# Patient Record
Sex: FEMALE | Race: WHITE | NOT HISPANIC OR LATINO | Employment: UNEMPLOYED | URBAN - NONMETROPOLITAN AREA
[De-identification: names, ages, dates, MRNs, and addresses within clinical notes are randomized per-mention and may not be internally consistent; named-entity substitution may affect disease eponyms.]

---

## 2017-02-07 ENCOUNTER — OFFICE VISIT (OUTPATIENT)
Dept: RETAIL CLINIC | Facility: CLINIC | Age: 27
End: 2017-02-07

## 2017-02-07 VITALS
RESPIRATION RATE: 20 BRPM | BODY MASS INDEX: 17.2 KG/M2 | OXYGEN SATURATION: 98 % | HEART RATE: 108 BPM | WEIGHT: 87.6 LBS | TEMPERATURE: 98.7 F | HEIGHT: 60 IN

## 2017-02-07 DIAGNOSIS — J01.00 SUBACUTE MAXILLARY SINUSITIS: Primary | ICD-10-CM

## 2017-02-07 LAB
EXPIRATION DATE: NORMAL
FLUAV AG NPH QL: NORMAL
FLUBV AG NPH QL: NORMAL
INTERNAL CONTROL: NORMAL
Lab: NORMAL

## 2017-02-07 PROCEDURE — 99213 OFFICE O/P EST LOW 20 MIN: CPT | Performed by: NURSE PRACTITIONER

## 2017-02-07 PROCEDURE — 87804 INFLUENZA ASSAY W/OPTIC: CPT | Performed by: NURSE PRACTITIONER

## 2017-02-07 RX ORDER — FLUTICASONE PROPIONATE 50 MCG
2 SPRAY, SUSPENSION (ML) NASAL DAILY
Qty: 1 BOTTLE | Refills: 0 | Status: SHIPPED | OUTPATIENT
Start: 2017-02-07 | End: 2017-03-09

## 2017-02-07 RX ORDER — AMOXICILLIN AND CLAVULANATE POTASSIUM 875; 125 MG/1; MG/1
1 TABLET, FILM COATED ORAL 2 TIMES DAILY
Qty: 10 TABLET | Refills: 0 | Status: SHIPPED | OUTPATIENT
Start: 2017-02-07 | End: 2017-02-12

## 2017-02-07 RX ORDER — FLUCONAZOLE 150 MG/1
150 TABLET ORAL ONCE
Qty: 1 TABLET | Refills: 0 | Status: SHIPPED | OUTPATIENT
Start: 2017-02-07 | End: 2017-02-07

## 2017-02-07 NOTE — PROGRESS NOTES
Subjective   Janie Hollsi is a 26 y.o. female.   Chief Complaint   Patient presents with   • Fever   • URI      Fever    This is a new problem. The current episode started yesterday. The maximum temperature noted was 101 to 101.9 F. The temperature was taken using an oral thermometer. Associated symptoms include congestion, coughing, ear pain (right), headaches and a sore throat. Pertinent negatives include no nausea or wheezing. She has tried NSAIDs for the symptoms. The treatment provided moderate relief.   URI    This is a new problem. The current episode started yesterday. The problem has been gradually worsening. The maximum temperature recorded prior to her arrival was 101 - 101.9 F. The fever has been present for 1 to 2 days. Associated symptoms include congestion, coughing, ear pain (right), headaches, rhinorrhea, sinus pain, sneezing and a sore throat. Pertinent negatives include no nausea or wheezing.      Janie presents to the clinic today c/o congestion which started yesterday. Associated symptoms include fever of 101, headache and cough. Has tried dayquil and nyquil without adequate relief. She has also taken motrin for the fever, last dose at 7:30 this morning. Her daughter was seen this morning and diagnosed with croup, she was tested for flu but they do not know the results yet. Refer to ROS for additional information.    The following portions of the patient's history were reviewed and updated as appropriate: allergies, current medications, past family history, past medical history, past social history, past surgical history and problem list.    Review of Systems   Constitutional: Positive for fever.   HENT: Positive for congestion, ear pain (right), rhinorrhea, sneezing and sore throat.    Respiratory: Positive for cough. Negative for wheezing.    Gastrointestinal: Negative for nausea.   Neurological: Positive for headaches.       Allergies   Allergen Reactions   • Codeine Other (See  "Comments)     Increased Heart Rate       Visit Vitals   • Pulse 108   • Temp 98.7 °F (37.1 °C) (Temporal Artery )   • Resp 20   • Ht 60\" (152.4 cm)   • Wt 87 lb 9.6 oz (39.7 kg)   • SpO2 98%   • BMI 17.11 kg/m2         Objective     Physical Exam   Constitutional: She is oriented to person, place, and time. She appears well-developed and well-nourished.   HENT:   Head: Normocephalic.   Right Ear: Tympanic membrane is bulging.   Left Ear: Tympanic membrane is bulging.   Nose: Rhinorrhea present. Right sinus exhibits maxillary sinus tenderness and frontal sinus tenderness. Left sinus exhibits maxillary sinus tenderness and frontal sinus tenderness.   Mouth/Throat: Posterior oropharyngeal erythema present.   Cardiovascular: Normal rate and regular rhythm.    Pulmonary/Chest: Effort normal and breath sounds normal.   Neurological: She is alert and oriented to person, place, and time.   Nursing note and vitals reviewed.      Assessment/Plan   Janie was seen today for fever and uri.    Diagnoses and all orders for this visit:    Subacute maxillary sinusitis  -     POC Influenza A / B    Other orders  -     amoxicillin-clavulanate (AUGMENTIN) 875-125 MG per tablet; Take 1 tablet by mouth 2 (Two) Times a Day for 5 days.  -     fluticasone (FLONASE) 50 MCG/ACT nasal spray; 2 sprays into each nostril Daily for 30 days. Administer 2 sprays in each nostril for each dose.  -     fluconazole (DIFLUCAN) 150 MG tablet; Take 1 tablet by mouth 1 (One) Time for 1 dose.             Discussed negative flu results with patient. Explained the importance of taking antibiotics until completion. Follow up with PCP or at the Urgent Care if symptoms worsen or fail to improve within next 48-72 hours.  Patient teaching information discussed and provided to the patient. Patient verbalized understanding.        "

## 2017-02-07 NOTE — PATIENT INSTRUCTIONS

## 2017-06-16 ENCOUNTER — OFFICE VISIT (OUTPATIENT)
Dept: RETAIL CLINIC | Facility: CLINIC | Age: 27
End: 2017-06-16

## 2017-06-16 VITALS
HEART RATE: 93 BPM | TEMPERATURE: 98 F | RESPIRATION RATE: 18 BRPM | OXYGEN SATURATION: 98 % | WEIGHT: 90.8 LBS | HEIGHT: 60 IN | BODY MASS INDEX: 17.83 KG/M2

## 2017-06-16 DIAGNOSIS — L23.7 POISON IVY DERMATITIS: Primary | ICD-10-CM

## 2017-06-16 PROCEDURE — 99212 OFFICE O/P EST SF 10 MIN: CPT | Performed by: NURSE PRACTITIONER

## 2017-06-16 RX ORDER — PREDNISONE 10 MG/1
TABLET ORAL
Qty: 12 TABLET | Refills: 0 | Status: SHIPPED | OUTPATIENT
Start: 2017-06-16 | End: 2018-05-03

## 2017-06-16 RX ORDER — TRIAMCINOLONE ACETONIDE 1 MG/G
CREAM TOPICAL 2 TIMES DAILY
Qty: 80 G | Refills: 0 | Status: SHIPPED | OUTPATIENT
Start: 2017-06-16 | End: 2018-05-03

## 2017-06-16 RX ORDER — BUSPIRONE HYDROCHLORIDE 7.5 MG/1
7.5 TABLET ORAL 3 TIMES DAILY
COMMUNITY
End: 2018-05-03

## 2017-06-16 NOTE — PATIENT INSTRUCTIONS
Poison Ivy Dermatitis  Poison ivy dermatitis is redness and soreness (inflammation) of the skin. It is caused by a chemical that is found on the leaves of the poison ivy plant. You may also have itching, a rash, and blisters. Symptoms often clear up in 1-2 weeks.   You may get this condition by touching a poison ivy plant. You can also get it by touching something that has the chemical on it. This may include animals or objects that have come in contact with the plant.  HOME CARE  General Instructions  · Take or apply over-the-counter and prescription medicines only as told by your doctor.  · If you touch poison ivy, wash your skin with soap and cold water right away.  · Use hydrocortisone creams or calamine lotion as needed to help with itching.  · Take oatmeal baths as needed. Use colloidal oatmeal. You can get this at a pharmacy or grocery store. Follow the instructions on the package.  · Do not scratch or rub your skin.  · While you have the rash, wash your clothes right after you wear them.  Prevention  · Know what poison ivy looks like so you can avoid it. This plant has three leaves with flowering branches on a single stem. The leaves are glossy. They have uneven edges that come to a point at the front.  · If you have touched poison ivy, wash with soap and water right away. Be sure to wash under your fingernails.  · When hiking or camping, wear long pants, a long-sleeved shirt, tall socks, and hiking boots. You can also use a lotion on your skin that helps to prevent contact with the chemical on the plant.  · If you think that your clothes or outdoor gear came in contact with poison ivy, rinse them off with a garden hose before you bring them inside your house.  GET HELP IF:  · You have open sores in the rash area.  · You have more redness, swelling, or pain in the affected area.  · You have redness that spreads beyond the rash area.  · You have fluid, blood, or pus coming from the affected area.  · You have  a fever.  · You have a rash over a large area of your body.  · You have a rash on your eyes, mouth, or genitals.  · Your rash does not get better after a few days.  GET HELP RIGHT AWAY IF:  · Your face swells or your eyes swell shut.  · You have trouble breathing.  · You have trouble swallowing.     This information is not intended to replace advice given to you by your health care provider. Make sure you discuss any questions you have with your health care provider.     Document Released: 01/20/2012 Document Revised: 04/10/2017 Document Reviewed: 05/25/2016  [employer] Interactive Patient Education ©2017 [employer]

## 2017-06-16 NOTE — PROGRESS NOTES
"Subjective   Janie Hollis is a 26 y.o. female.     Chief Complaint   Patient presents with   • Rash      History of Present Illness   Rash  Patient presents for evaluation of a rash involving the face, abdomen and left leg. Rash started 5 days ago. Lesions are red, and raised and fluid filled in texture. Rash has changed over time. Rash is pruritic. Associated symptoms: none. Patient denies: fever, headache and sore throat. Patient has not had contacts with similar rash. Patient has had new exposures poison ivy plant while working in her yard days earlier.    Reports was seen at  4 days ago, treated with a steroid injection. Had initial improvement of rash. However this am she noticed the rash had spread to her face.    The following portions of the patient's history were reviewed and updated as appropriate: allergies, current medications, past family history, past medical history, past social history, past surgical history and problem list.    Current Outpatient Prescriptions:   •  busPIRone (BUSPAR) 7.5 MG tablet, Take 7.5 mg by mouth 3 (Three) Times a Day., Disp: , Rfl:   •  predniSONE (DELTASONE) 10 MG tablet, Take 2 tabs po BID x 2 days; then 1 tab po BID x 2 days; then 1 tab daily x 2 days., Disp: 12 tablet, Rfl: 0  •  sertraline (ZOLOFT) 50 MG tablet, Take 50 mg by mouth Daily., Disp: , Rfl:   •  triamcinolone (KENALOG) 0.1 % cream, Apply  topically 2 (Two) Times a Day. To the affected area, Disp: 80 g, Rfl: 0    Pulse 93  Temp 98 °F (36.7 °C)  Resp 18  Ht 60\" (152.4 cm)  Wt 90 lb 12.8 oz (41.2 kg)  SpO2 98%  BMI 17.73 kg/m2    Review of Systems   Constitutional: Negative for chills and fever.   Respiratory: Negative for choking and wheezing.    Skin: Positive for rash. Negative for color change.       Allergies   Allergen Reactions   • Codeine Other (See Comments)     Increased Heart Rate     Physical Exam   Constitutional: She appears well-developed.   HENT:   Head: Normocephalic.   Eyes: " Conjunctivae are normal.   Cardiovascular: Normal rate.    Pulmonary/Chest: Effort normal and breath sounds normal.   Skin: Skin is warm. Rash noted.        Fluid filled rash on erythema base noted under the left eye, abdomen and left lower extremity, sparsely scattered. Positive for pruritis. Negative for signs of infection.       Assessment/Plan     Janie was seen today for rash.    Diagnoses and all orders for this visit:    Poison ivy dermatitis  -     triamcinolone (KENALOG) 0.1 % cream; Apply  topically 2 (Two) Times a Day. To the affected area  -     predniSONE (DELTASONE) 10 MG tablet; Take 2 tabs po BID x 2 days; then 1 tab po BID x 2 days; then 1 tab daily x 2 days.         Discussed treatment plan. Follow up with PCP or at the Urgent Care if symptoms worsen or fail to improve within next week. Patient teaching information discussed and provided to the patient. Patient verbalized understanding.      June 16, 2017 5:54 PM

## 2018-04-04 ENCOUNTER — APPOINTMENT (OUTPATIENT)
Dept: LAB | Facility: HOSPITAL | Age: 28
End: 2018-04-04
Attending: OBSTETRICS & GYNECOLOGY

## 2018-04-04 ENCOUNTER — TRANSCRIBE ORDERS (OUTPATIENT)
Dept: ADMINISTRATIVE | Facility: HOSPITAL | Age: 28
End: 2018-04-04

## 2018-04-04 DIAGNOSIS — N93.9 VAGINAL HEMORRHAGE: Primary | ICD-10-CM

## 2018-04-04 LAB
ANION GAP SERPL CALCULATED.3IONS-SCNC: 6.9 MMOL/L (ref 3.6–11.2)
BASOPHILS # BLD AUTO: 0.08 10*3/MM3 (ref 0–0.3)
BASOPHILS NFR BLD AUTO: 1 % (ref 0–2)
BILIRUB UR QL STRIP: NEGATIVE
BUN BLD-MCNC: 8 MG/DL (ref 7–21)
BUN/CREAT SERPL: 13.3 (ref 7–25)
CALCIUM SPEC-SCNC: 9.4 MG/DL (ref 7.7–10)
CHLORIDE SERPL-SCNC: 106 MMOL/L (ref 99–112)
CLARITY UR: CLEAR
CO2 SERPL-SCNC: 27.1 MMOL/L (ref 24.3–31.9)
COLOR UR: YELLOW
CREAT BLD-MCNC: 0.6 MG/DL (ref 0.43–1.29)
DEPRECATED RDW RBC AUTO: 43 FL (ref 37–54)
EOSINOPHIL # BLD AUTO: 0.06 10*3/MM3 (ref 0–0.7)
EOSINOPHIL NFR BLD AUTO: 0.8 % (ref 0–5)
ERYTHROCYTE [DISTWIDTH] IN BLOOD BY AUTOMATED COUNT: 14.5 % (ref 11.5–14.5)
GFR SERPL CREATININE-BSD FRML MDRD: 122 ML/MIN/1.73
GLUCOSE BLD-MCNC: 83 MG/DL (ref 70–110)
GLUCOSE UR STRIP-MCNC: NEGATIVE MG/DL
HCG INTACT+B SERPL-ACNC: <2 MIU/ML (ref 0–5)
HCT VFR BLD AUTO: 41.8 % (ref 37–47)
HGB BLD-MCNC: 14.1 G/DL (ref 12–16)
HGB UR QL STRIP.AUTO: NEGATIVE
IMM GRANULOCYTES # BLD: 0.02 10*3/MM3 (ref 0–0.03)
IMM GRANULOCYTES NFR BLD: 0.3 % (ref 0–0.5)
KETONES UR QL STRIP: NEGATIVE
LEUKOCYTE ESTERASE UR QL STRIP.AUTO: NEGATIVE
LYMPHOCYTES # BLD AUTO: 2.92 10*3/MM3 (ref 1–3)
LYMPHOCYTES NFR BLD AUTO: 37.7 % (ref 21–51)
MCH RBC QN AUTO: 27.4 PG (ref 27–33)
MCHC RBC AUTO-ENTMCNC: 33.7 G/DL (ref 33–37)
MCV RBC AUTO: 81.2 FL (ref 80–94)
MONOCYTES # BLD AUTO: 0.64 10*3/MM3 (ref 0.1–0.9)
MONOCYTES NFR BLD AUTO: 8.3 % (ref 0–10)
NEUTROPHILS # BLD AUTO: 4.03 10*3/MM3 (ref 1.4–6.5)
NEUTROPHILS NFR BLD AUTO: 51.9 % (ref 30–70)
NITRITE UR QL STRIP: NEGATIVE
OSMOLALITY SERPL CALC.SUM OF ELEC: 276.9 MOSM/KG (ref 273–305)
PH UR STRIP.AUTO: 5.5 [PH] (ref 5–8)
PLATELET # BLD AUTO: 247 10*3/MM3 (ref 130–400)
PMV BLD AUTO: 11 FL (ref 6–10)
POTASSIUM BLD-SCNC: 3.6 MMOL/L (ref 3.5–5.3)
PROT UR QL STRIP: NEGATIVE
RBC # BLD AUTO: 5.15 10*6/MM3 (ref 4.2–5.4)
SODIUM BLD-SCNC: 140 MMOL/L (ref 135–153)
SP GR UR STRIP: 1.02 (ref 1–1.03)
T4 FREE SERPL-MCNC: 1.07 NG/DL (ref 0.89–1.76)
TSH SERPL DL<=0.05 MIU/L-ACNC: 1.73 MIU/ML (ref 0.55–4.78)
UROBILINOGEN UR QL STRIP: NORMAL
WBC NRBC COR # BLD: 7.75 10*3/MM3 (ref 4.5–12.5)

## 2018-04-04 PROCEDURE — 85025 COMPLETE CBC W/AUTO DIFF WBC: CPT | Performed by: OBSTETRICS & GYNECOLOGY

## 2018-04-04 PROCEDURE — 80048 BASIC METABOLIC PNL TOTAL CA: CPT | Performed by: OBSTETRICS & GYNECOLOGY

## 2018-04-04 PROCEDURE — 84702 CHORIONIC GONADOTROPIN TEST: CPT | Performed by: OBSTETRICS & GYNECOLOGY

## 2018-04-04 PROCEDURE — 84443 ASSAY THYROID STIM HORMONE: CPT | Performed by: OBSTETRICS & GYNECOLOGY

## 2018-04-04 PROCEDURE — 36415 COLL VENOUS BLD VENIPUNCTURE: CPT | Performed by: OBSTETRICS & GYNECOLOGY

## 2018-04-04 PROCEDURE — 81003 URINALYSIS AUTO W/O SCOPE: CPT | Performed by: OBSTETRICS & GYNECOLOGY

## 2018-04-04 PROCEDURE — 84439 ASSAY OF FREE THYROXINE: CPT | Performed by: OBSTETRICS & GYNECOLOGY

## 2018-05-02 ENCOUNTER — PREP FOR SURGERY (OUTPATIENT)
Dept: OTHER | Facility: HOSPITAL | Age: 28
End: 2018-05-02

## 2018-05-02 DIAGNOSIS — N80.9 ENDOMETRIOSIS: Primary | ICD-10-CM

## 2018-05-02 RX ORDER — SODIUM CHLORIDE 0.9 % (FLUSH) 0.9 %
1-10 SYRINGE (ML) INJECTION AS NEEDED
Status: CANCELLED | OUTPATIENT
Start: 2018-05-08

## 2018-05-03 ENCOUNTER — APPOINTMENT (OUTPATIENT)
Dept: PREADMISSION TESTING | Facility: HOSPITAL | Age: 28
End: 2018-05-03

## 2018-05-03 DIAGNOSIS — N80.9 ENDOMETRIOSIS: ICD-10-CM

## 2018-05-03 LAB
ABO GROUP BLD: NORMAL
ANION GAP SERPL CALCULATED.3IONS-SCNC: 6 MMOL/L (ref 3.6–11.2)
BASOPHILS # BLD AUTO: 0.03 10*3/MM3 (ref 0–0.3)
BASOPHILS NFR BLD AUTO: 0.5 % (ref 0–2)
BLD GP AB SCN SERPL QL: NEGATIVE
BUN BLD-MCNC: 8 MG/DL (ref 7–21)
BUN/CREAT SERPL: 14.5 (ref 7–25)
CALCIUM SPEC-SCNC: 9.1 MG/DL (ref 7.7–10)
CHLORIDE SERPL-SCNC: 106 MMOL/L (ref 99–112)
CO2 SERPL-SCNC: 27 MMOL/L (ref 24.3–31.9)
CREAT BLD-MCNC: 0.55 MG/DL (ref 0.43–1.29)
DEPRECATED RDW RBC AUTO: 40.8 FL (ref 37–54)
EOSINOPHIL # BLD AUTO: 0.06 10*3/MM3 (ref 0–0.7)
EOSINOPHIL NFR BLD AUTO: 1 % (ref 0–5)
ERYTHROCYTE [DISTWIDTH] IN BLOOD BY AUTOMATED COUNT: 14 % (ref 11.5–14.5)
GFR SERPL CREATININE-BSD FRML MDRD: 135 ML/MIN/1.73
GLUCOSE BLD-MCNC: 66 MG/DL (ref 70–110)
HCT VFR BLD AUTO: 39 % (ref 37–47)
HGB BLD-MCNC: 13.6 G/DL (ref 12–16)
IMM GRANULOCYTES # BLD: 0.01 10*3/MM3 (ref 0–0.03)
IMM GRANULOCYTES NFR BLD: 0.2 % (ref 0–0.5)
LYMPHOCYTES # BLD AUTO: 1.96 10*3/MM3 (ref 1–3)
LYMPHOCYTES NFR BLD AUTO: 31.5 % (ref 21–51)
MCH RBC QN AUTO: 28.5 PG (ref 27–33)
MCHC RBC AUTO-ENTMCNC: 34.9 G/DL (ref 33–37)
MCV RBC AUTO: 81.6 FL (ref 80–94)
MONOCYTES # BLD AUTO: 0.35 10*3/MM3 (ref 0.1–0.9)
MONOCYTES NFR BLD AUTO: 5.6 % (ref 0–10)
NEUTROPHILS # BLD AUTO: 3.81 10*3/MM3 (ref 1.4–6.5)
NEUTROPHILS NFR BLD AUTO: 61.2 % (ref 30–70)
OSMOLALITY SERPL CALC.SUM OF ELEC: 274.1 MOSM/KG (ref 273–305)
PLATELET # BLD AUTO: 226 10*3/MM3 (ref 130–400)
PMV BLD AUTO: 11.2 FL (ref 6–10)
POTASSIUM BLD-SCNC: 3.6 MMOL/L (ref 3.5–5.3)
RBC # BLD AUTO: 4.78 10*6/MM3 (ref 4.2–5.4)
RH BLD: POSITIVE
SODIUM BLD-SCNC: 139 MMOL/L (ref 135–153)
T&S EXPIRATION DATE: NORMAL
WBC NRBC COR # BLD: 6.22 10*3/MM3 (ref 4.5–12.5)

## 2018-05-03 PROCEDURE — 80048 BASIC METABOLIC PNL TOTAL CA: CPT | Performed by: OBSTETRICS & GYNECOLOGY

## 2018-05-03 PROCEDURE — 85025 COMPLETE CBC W/AUTO DIFF WBC: CPT | Performed by: OBSTETRICS & GYNECOLOGY

## 2018-05-03 PROCEDURE — 86901 BLOOD TYPING SEROLOGIC RH(D): CPT | Performed by: OBSTETRICS & GYNECOLOGY

## 2018-05-03 PROCEDURE — 86900 BLOOD TYPING SEROLOGIC ABO: CPT | Performed by: OBSTETRICS & GYNECOLOGY

## 2018-05-03 PROCEDURE — 86850 RBC ANTIBODY SCREEN: CPT | Performed by: OBSTETRICS & GYNECOLOGY

## 2018-05-03 PROCEDURE — 36415 COLL VENOUS BLD VENIPUNCTURE: CPT

## 2018-05-03 RX ORDER — PAROXETINE 30 MG/1
30 TABLET, FILM COATED ORAL EVERY MORNING
COMMUNITY
End: 2019-07-01

## 2018-05-03 NOTE — DISCHARGE INSTRUCTIONS
TAKE the following medications the morning of surgery:  All heart or blood pressure medications    Please discontinue all blood thinners and anticoagulants (except aspirin) prior to surgery as per your surgeon and cardiologist instructions.  Aspirin may be continued up to the day prior to surgery.    HOLD all diabetic medications the morning of surgery as order by physician.    Please follow instructions on use of prep cloths provided by nurse. Return instruction sheet with stickers attached to pre-op nurse on day of surgery.    Arrival time for surgery on 5/8/2018 is 1030 am.    General Instructions:  • Do NOT eat or drink after midnight 5/7/2018 which includes water, mints, or gum.  • You may brush your teeth. Dental appliances that are removable must be taken out day of surgery.  • Do NOT smoke, chew tobacco, or drink alcohol within 24 hours prior to surgery.  • Bring medications in original bottles, any inhalers and if applicable your C-PAP/BI-PAP machine  • Bring any papers given to you in the doctor’s office  • Wear clean, comfortable clothes and socks  • Do NOT wear contact lenses or make-up or dark nail polish.  Bring a case for your glasses if applicable.  • Bring crutches or walker if applicable  • Leave all other valuables and jewelry at home  • If you were given a blood bank armband, continue to wear it until discharged.    Preventing a Surgical Site Infection:  • Shower the night before surgery (unless instructed otherwise) using a fresh bar of anti-bacterial soap (such as Dial) and clean washcloth.  Dry with a clean towel and dress in clean clothing.  • For 2 to 3 days before surgery, avoid shaving with a razor near where you will have surgery because the razor can irritate skin and make it easier to develop an infection.  Ask your surgeon if you will be receiving antibiotics prior to surgery.  • Make sure you, your family, and all healthcare providers clean their hands with soap and water or an  alcohol-based hand  before caring for you or your wound.  • If at all possible, quit smoking as many days before surgery as you can.    Day of Surgery:  Upon arrival, a pre-op nurse and anesthesiologist will review your health history, obtain vital signs, and answer questions you may have.  The only belongings needed at this time will be your home medications and if applicable you C-PAP/BI-PAP machine.  If you are staying overnight, your family can leave the rest of your belongings in the car and bring them to your room later.  A pre-op nurse will start an IV and you may receive medication in preparation for surgery.  Due to patient privacy and limited space, only one member of your family will be able to accompany you in the pre-op area.  While you are in surgery your family should notify the waiting room  if they leave the waiting room area and provide a contact number.  Please be aware that surgery does come with discomfort.  We want to make every effort to control your discomfort so please discuss any uncontrolled symptoms with your nurse.  Your doctor will most likely have prescribed pain medications.  If you are going home after surgery you will receive individualized written care instructions before being discharged.  A responsible adult must drive you to and from the hospital on the day of surgery and stay with you for 24 hours.  If you are staying overnight following surgery, you will be transported to your hospital room following the recovery period.

## 2018-05-08 ENCOUNTER — APPOINTMENT (OUTPATIENT)
Dept: GENERAL RADIOLOGY | Facility: HOSPITAL | Age: 28
End: 2018-05-08
Attending: OBSTETRICS & GYNECOLOGY

## 2018-05-08 ENCOUNTER — ANESTHESIA (OUTPATIENT)
Dept: PERIOP | Facility: HOSPITAL | Age: 28
End: 2018-05-08

## 2018-05-08 ENCOUNTER — ANESTHESIA EVENT (OUTPATIENT)
Dept: PERIOP | Facility: HOSPITAL | Age: 28
End: 2018-05-08

## 2018-05-08 ENCOUNTER — HOSPITAL ENCOUNTER (OUTPATIENT)
Facility: HOSPITAL | Age: 28
Setting detail: HOSPITAL OUTPATIENT SURGERY
Discharge: HOME | End: 2018-05-08
Attending: OBSTETRICS & GYNECOLOGY | Admitting: OBSTETRICS & GYNECOLOGY

## 2018-05-08 ENCOUNTER — HOSPITAL ENCOUNTER (EMERGENCY)
Facility: HOSPITAL | Age: 28
Discharge: HOME | End: 2018-05-08
Attending: INTERNAL MEDICINE | Admitting: INTERNAL MEDICINE

## 2018-05-08 VITALS
DIASTOLIC BLOOD PRESSURE: 72 MMHG | OXYGEN SATURATION: 100 % | RESPIRATION RATE: 18 BRPM | TEMPERATURE: 98 F | WEIGHT: 92 LBS | SYSTOLIC BLOOD PRESSURE: 108 MMHG | HEART RATE: 82 BPM | HEIGHT: 60 IN | BODY MASS INDEX: 18.06 KG/M2

## 2018-05-08 VITALS
WEIGHT: 92 LBS | OXYGEN SATURATION: 97 % | DIASTOLIC BLOOD PRESSURE: 59 MMHG | SYSTOLIC BLOOD PRESSURE: 97 MMHG | HEART RATE: 76 BPM | TEMPERATURE: 97.6 F | RESPIRATION RATE: 18 BRPM | BODY MASS INDEX: 18.06 KG/M2 | HEIGHT: 60 IN

## 2018-05-08 DIAGNOSIS — N93.9 ABNORMAL UTERINE BLEEDING (AUB): ICD-10-CM

## 2018-05-08 DIAGNOSIS — T78.40XA ALLERGIC REACTION TO DRUG, INITIAL ENCOUNTER: Primary | ICD-10-CM

## 2018-05-08 DIAGNOSIS — N80.9 ENDOMETRIOSIS: ICD-10-CM

## 2018-05-08 DIAGNOSIS — R10.2 PELVIC PAIN: ICD-10-CM

## 2018-05-08 LAB
B-HCG UR QL: NEGATIVE
INTERNAL NEGATIVE CONTROL: NEGATIVE
INTERNAL POSITIVE CONTROL: POSITIVE
Lab: NORMAL

## 2018-05-08 PROCEDURE — 25010000002 MIDAZOLAM PER 1 MG: Performed by: NURSE ANESTHETIST, CERTIFIED REGISTERED

## 2018-05-08 PROCEDURE — 96372 THER/PROPH/DIAG INJ SC/IM: CPT

## 2018-05-08 PROCEDURE — 25010000002 NEOSTIGMINE 10 MG/10ML SOLUTION: Performed by: NURSE ANESTHETIST, CERTIFIED REGISTERED

## 2018-05-08 PROCEDURE — 25010000002 ONDANSETRON PER 1 MG: Performed by: NURSE ANESTHETIST, CERTIFIED REGISTERED

## 2018-05-08 PROCEDURE — 25010000002 DIPHENHYDRAMINE PER 50 MG: Performed by: PHYSICIAN ASSISTANT

## 2018-05-08 PROCEDURE — 25010000002 METHYLPREDNISOLONE PER 125 MG: Performed by: PHYSICIAN ASSISTANT

## 2018-05-08 PROCEDURE — 25010000002 PROPOFOL 10 MG/ML EMULSION: Performed by: NURSE ANESTHETIST, CERTIFIED REGISTERED

## 2018-05-08 PROCEDURE — 25010000002 CEFOXITIN: Performed by: OBSTETRICS & GYNECOLOGY

## 2018-05-08 PROCEDURE — 99283 EMERGENCY DEPT VISIT LOW MDM: CPT

## 2018-05-08 PROCEDURE — 25010000002 DEXAMETHASONE PER 1 MG: Performed by: NURSE ANESTHETIST, CERTIFIED REGISTERED

## 2018-05-08 PROCEDURE — 25010000002 FENTANYL CITRATE (PF) 100 MCG/2ML SOLUTION: Performed by: NURSE ANESTHETIST, CERTIFIED REGISTERED

## 2018-05-08 RX ORDER — LIDOCAINE HYDROCHLORIDE 20 MG/ML
INJECTION, SOLUTION INFILTRATION; PERINEURAL AS NEEDED
Status: DISCONTINUED | OUTPATIENT
Start: 2018-05-08 | End: 2018-05-08 | Stop reason: SURG

## 2018-05-08 RX ORDER — METHYLPREDNISOLONE SODIUM SUCCINATE 125 MG/2ML
125 INJECTION, POWDER, LYOPHILIZED, FOR SOLUTION INTRAMUSCULAR; INTRAVENOUS ONCE
Status: COMPLETED | OUTPATIENT
Start: 2018-05-08 | End: 2018-05-08

## 2018-05-08 RX ORDER — SODIUM CHLORIDE 0.9 % (FLUSH) 0.9 %
1-10 SYRINGE (ML) INJECTION AS NEEDED
Status: DISCONTINUED | OUTPATIENT
Start: 2018-05-08 | End: 2018-05-08 | Stop reason: HOSPADM

## 2018-05-08 RX ORDER — PREDNISONE 20 MG/1
20 TABLET ORAL 3 TIMES DAILY
Qty: 15 TABLET | Refills: 0 | Status: SHIPPED | OUTPATIENT
Start: 2018-05-08 | End: 2018-05-13

## 2018-05-08 RX ORDER — DIPHENHYDRAMINE HCL 50 MG
50 CAPSULE ORAL EVERY 6 HOURS PRN
Qty: 30 CAPSULE | Refills: 0 | Status: SHIPPED | OUTPATIENT
Start: 2018-05-08 | End: 2018-07-25

## 2018-05-08 RX ORDER — FENTANYL CITRATE 50 UG/ML
50 INJECTION, SOLUTION INTRAMUSCULAR; INTRAVENOUS
Status: DISCONTINUED | OUTPATIENT
Start: 2018-05-08 | End: 2018-05-08 | Stop reason: HOSPADM

## 2018-05-08 RX ORDER — PROPOFOL 10 MG/ML
VIAL (ML) INTRAVENOUS AS NEEDED
Status: DISCONTINUED | OUTPATIENT
Start: 2018-05-08 | End: 2018-05-08 | Stop reason: SURG

## 2018-05-08 RX ORDER — DEXAMETHASONE SODIUM PHOSPHATE 10 MG/ML
INJECTION INTRAMUSCULAR; INTRAVENOUS AS NEEDED
Status: DISCONTINUED | OUTPATIENT
Start: 2018-05-08 | End: 2018-05-08 | Stop reason: SURG

## 2018-05-08 RX ORDER — ONDANSETRON 2 MG/ML
INJECTION INTRAMUSCULAR; INTRAVENOUS AS NEEDED
Status: DISCONTINUED | OUTPATIENT
Start: 2018-05-08 | End: 2018-05-08 | Stop reason: SURG

## 2018-05-08 RX ORDER — SODIUM CHLORIDE, SODIUM LACTATE, POTASSIUM CHLORIDE, CALCIUM CHLORIDE 600; 310; 30; 20 MG/100ML; MG/100ML; MG/100ML; MG/100ML
125 INJECTION, SOLUTION INTRAVENOUS CONTINUOUS
Status: DISCONTINUED | OUTPATIENT
Start: 2018-05-08 | End: 2018-05-08 | Stop reason: HOSPADM

## 2018-05-08 RX ORDER — DIPHENHYDRAMINE HYDROCHLORIDE 50 MG/ML
25 INJECTION INTRAMUSCULAR; INTRAVENOUS ONCE
Status: COMPLETED | OUTPATIENT
Start: 2018-05-08 | End: 2018-05-08

## 2018-05-08 RX ORDER — MEPERIDINE HYDROCHLORIDE 50 MG/ML
12.5 INJECTION INTRAMUSCULAR; INTRAVENOUS; SUBCUTANEOUS
Status: DISCONTINUED | OUTPATIENT
Start: 2018-05-08 | End: 2018-05-08 | Stop reason: HOSPADM

## 2018-05-08 RX ORDER — GLYCOPYRROLATE 0.2 MG/ML
INJECTION INTRAMUSCULAR; INTRAVENOUS AS NEEDED
Status: DISCONTINUED | OUTPATIENT
Start: 2018-05-08 | End: 2018-05-08 | Stop reason: SURG

## 2018-05-08 RX ORDER — LIDOCAINE HYDROCHLORIDE AND EPINEPHRINE 10; 10 MG/ML; UG/ML
INJECTION, SOLUTION INFILTRATION; PERINEURAL AS NEEDED
Status: DISCONTINUED | OUTPATIENT
Start: 2018-05-08 | End: 2018-05-08 | Stop reason: HOSPADM

## 2018-05-08 RX ORDER — OXYCODONE HYDROCHLORIDE AND ACETAMINOPHEN 5; 325 MG/1; MG/1
1 TABLET ORAL EVERY 4 HOURS PRN
Qty: 40 TABLET | Refills: 0 | Status: SHIPPED | OUTPATIENT
Start: 2018-05-08 | End: 2018-05-08

## 2018-05-08 RX ORDER — MAGNESIUM HYDROXIDE 1200 MG/15ML
LIQUID ORAL AS NEEDED
Status: DISCONTINUED | OUTPATIENT
Start: 2018-05-08 | End: 2018-05-08 | Stop reason: HOSPADM

## 2018-05-08 RX ORDER — MIDAZOLAM HYDROCHLORIDE 1 MG/ML
INJECTION INTRAMUSCULAR; INTRAVENOUS AS NEEDED
Status: DISCONTINUED | OUTPATIENT
Start: 2018-05-08 | End: 2018-05-08 | Stop reason: SURG

## 2018-05-08 RX ORDER — NEOSTIGMINE METHYLSULFATE 1 MG/ML
INJECTION, SOLUTION INTRAVENOUS AS NEEDED
Status: DISCONTINUED | OUTPATIENT
Start: 2018-05-08 | End: 2018-05-08 | Stop reason: SURG

## 2018-05-08 RX ORDER — FENTANYL CITRATE 50 UG/ML
INJECTION, SOLUTION INTRAMUSCULAR; INTRAVENOUS AS NEEDED
Status: DISCONTINUED | OUTPATIENT
Start: 2018-05-08 | End: 2018-05-08 | Stop reason: SURG

## 2018-05-08 RX ORDER — HYDROCODONE BITARTRATE AND ACETAMINOPHEN 7.5; 325 MG/1; MG/1
1 TABLET ORAL EVERY 4 HOURS PRN
Qty: 15 TABLET | Refills: 0 | Status: SHIPPED | OUTPATIENT
Start: 2018-05-08 | End: 2018-07-25

## 2018-05-08 RX ORDER — ONDANSETRON 2 MG/ML
4 INJECTION INTRAMUSCULAR; INTRAVENOUS ONCE AS NEEDED
Status: DISCONTINUED | OUTPATIENT
Start: 2018-05-08 | End: 2018-05-08 | Stop reason: HOSPADM

## 2018-05-08 RX ORDER — IPRATROPIUM BROMIDE AND ALBUTEROL SULFATE 2.5; .5 MG/3ML; MG/3ML
3 SOLUTION RESPIRATORY (INHALATION) ONCE AS NEEDED
Status: DISCONTINUED | OUTPATIENT
Start: 2018-05-08 | End: 2018-05-08 | Stop reason: HOSPADM

## 2018-05-08 RX ORDER — ROCURONIUM BROMIDE 10 MG/ML
INJECTION, SOLUTION INTRAVENOUS AS NEEDED
Status: DISCONTINUED | OUTPATIENT
Start: 2018-05-08 | End: 2018-05-08 | Stop reason: SURG

## 2018-05-08 RX ADMIN — DIPHENHYDRAMINE HYDROCHLORIDE 25 MG: 50 INJECTION INTRAMUSCULAR; INTRAVENOUS at 18:55

## 2018-05-08 RX ADMIN — SODIUM CHLORIDE, POTASSIUM CHLORIDE, SODIUM LACTATE AND CALCIUM CHLORIDE 125 ML/HR: 600; 310; 30; 20 INJECTION, SOLUTION INTRAVENOUS at 12:25

## 2018-05-08 RX ADMIN — CEFOXITIN 2 G: 2 INJECTION, POWDER, FOR SOLUTION INTRAVENOUS at 12:56

## 2018-05-08 RX ADMIN — GLYCOPYRROLATE 0.4 MG: 0.2 INJECTION, SOLUTION INTRAMUSCULAR; INTRAVENOUS at 13:25

## 2018-05-08 RX ADMIN — METHYLPREDNISOLONE SODIUM SUCCINATE 125 MG: 125 INJECTION, POWDER, FOR SOLUTION INTRAMUSCULAR; INTRAVENOUS at 18:55

## 2018-05-08 RX ADMIN — DEXAMETHASONE SODIUM PHOSPHATE 4 MG: 10 INJECTION INTRAMUSCULAR; INTRAVENOUS at 13:00

## 2018-05-08 RX ADMIN — MIDAZOLAM HYDROCHLORIDE 2 MG: 1 INJECTION, SOLUTION INTRAMUSCULAR; INTRAVENOUS at 12:56

## 2018-05-08 RX ADMIN — LIDOCAINE HYDROCHLORIDE 60 MG: 20 INJECTION, SOLUTION INFILTRATION; PERINEURAL at 13:00

## 2018-05-08 RX ADMIN — NEOSTIGMINE METHYLSULFATE 3 MG: 1 INJECTION, SOLUTION INTRAVENOUS at 13:25

## 2018-05-08 RX ADMIN — ONDANSETRON 4 MG: 2 INJECTION, SOLUTION INTRAMUSCULAR; INTRAVENOUS at 13:00

## 2018-05-08 RX ADMIN — ROCURONIUM BROMIDE 20 MG: 10 INJECTION INTRAVENOUS at 13:00

## 2018-05-08 RX ADMIN — FENTANYL CITRATE 100 MCG: 50 INJECTION INTRAMUSCULAR; INTRAVENOUS at 12:56

## 2018-05-08 RX ADMIN — FENTANYL CITRATE 50 MCG: 50 INJECTION INTRAMUSCULAR; INTRAVENOUS at 13:53

## 2018-05-08 RX ADMIN — ONDANSETRON 4 MG: 2 INJECTION INTRAMUSCULAR; INTRAVENOUS at 13:43

## 2018-05-08 RX ADMIN — PROPOFOL 120 MG: 10 INJECTION, EMULSION INTRAVENOUS at 13:00

## 2018-05-08 NOTE — ANESTHESIA POSTPROCEDURE EVALUATION
Patient: Janie Hollis    Procedure Summary     Date:  05/08/18 Room / Location:  [facility] OR 04 /  [facility] OR    Anesthesia Start:  1256 Anesthesia Stop:  1337    Procedure:  DILATATION AND CURETTAGE AND DIAGNOSTIC LAPAROSCOPY with fulgaration of endometriosis (N/A Vagina) Diagnosis:  (N93.9)    Surgeon:  Onel Mora MD Provider:  Diaz Richards MD    Anesthesia Type:  general ASA Status:  2          Anesthesia Type: general  Last vitals  BP   97/59 (05/08/18 1432)   Temp   97.6 °F (36.4 °C) (05/08/18 1410)   Pulse   76 (05/08/18 1432)   Resp   18 (05/08/18 1432)     SpO2   97 % (05/08/18 1432)     Post Anesthesia Care and Evaluation    Patient location during evaluation: PHASE II  Patient participation: complete - patient participated  Level of consciousness: awake and alert  Pain score: 1  Pain management: adequate  Airway patency: patent  Anesthetic complications: No anesthetic complications  PONV Status: controlled  Cardiovascular status: acceptable  Respiratory status: acceptable  Hydration status: acceptable

## 2018-05-08 NOTE — ANESTHESIA PROCEDURE NOTES
Airway  Urgency: elective    Date/Time: 5/8/2018 1:02 PM  Airway not difficult    General Information and Staff    Patient location during procedure: OR  Anesthesiologist: DARIO PARKINSON  CRNA: NORA ADAMS    Indications and Patient Condition  Indications for airway management: airway protection    Preoxygenated: yes  MILS maintained throughout  Mask difficulty assessment: 0 - not attempted    Final Airway Details  Final airway type: endotracheal airway      Successful airway: ETT  Cuffed: yes   Successful intubation technique: direct laryngoscopy  Facilitating devices/methods: intubating stylet  Endotracheal tube insertion site: oral  Blade: Marvin  Blade size: #3  ETT size: 7.0 mm  Cormack-Lehane Classification: grade I - full view of glottis  Placement verified by: chest auscultation, capnometry and palpation of cuff   Cuff volume (mL): 10  Measured from: lips  ETT to lips (cm): 21  Number of attempts at approach: 1    Additional Comments  Dentition and lips same as preop

## 2018-05-08 NOTE — OP NOTE
OPERATIVE NOTE    Preoperative diagnosis: Pelvic pain, abnormal uterine bleeding    Postoperative diagnosis: Pelvic pain, abnormal uterine bleeding, endometriosis    Procedure performed: D&C, followed by laparoscopy with cautery of endometriosis    Specimens removed: Endometrial curettings    Anesthesia: General    Surgeon: Dr. Onel Mora    Assistant: None    Estimated blood loss: 20 cc    Blood products: None    Grafts/implants: None    Complications: None    Description of the procedure:This patient was taken to the operating room, placed on the operating table in the supine position, and prepped and draped in usual fashion for laparoscopic and vaginal surgery.  A Ortiz catheter was placed in her bladder.  D&C was carried out in the usual fashion.  Uterus was about 8 cm.  Endometrial curettings were sent to pathology labeled as such.  A Hulka tenaculum was placed in the uterus.  Following this, a 5 mm Optiview trocar was used to enter through the umbilicus.  Insufflation was accomplished, and another 5 mm trocar was placed in the left lower quadrant.    We found the following; patient has extensive brown endometriosis.  She had very deep John-Masters windows on both sides.  We got down inside these and cauterized endometriosis.  She also had endometriosis over the pelvic sidewall specimen on the left over the great vessels going to her leg.  Obviously, we could not cauterize these.  Appendix looked normal.  Tubes looked normal.  Liver edge and gallbladder looked normal.    All fascial incisions over 8 mm were closed with Vicryl, and the rest of her incisions were closed, after evacuation of CO2, with subcuticular Vicryl and Steri-Strips.  All instruments and the catheter were removed and the patient was returned to the post anesthetic recovery room in satisfactory condition with clear urine.    Significant points about this procedure: Endometriosis as above.

## 2018-05-08 NOTE — ED PROVIDER NOTES
"Subjective   28 y/o female that comes in with c/c \"Alergic reaction\" x one day. Patient states that's she was prescribed percocet after D&C performed earlier today by Dr. Mora. Patient states that approx. 1 hour after taking medication started having itching, eye swelling, and difficulty swallowing. Patient denies taking this medication before.         History provided by:  Patient   used: No    Allergic Reaction   Presenting symptoms: difficulty swallowing, itching, rash and swelling    Severity:  Moderate  Duration:  1 day  Prior allergic episodes:  No prior episodes  Context: medications    Context: not animal exposure, not chemicals, not cosmetics, not food allergies, not grass and not insect bite/sting    Relieved by:  Nothing  Worsened by:  Nothing  Ineffective treatments:  None tried      Review of Systems   Constitutional: Negative for appetite change, chills, diaphoresis and fatigue.   HENT: Positive for trouble swallowing. Negative for drooling, ear discharge and ear pain.    Eyes: Negative for pain, redness and itching.   Respiratory: Negative for cough, choking and shortness of breath.    Cardiovascular: Negative for chest pain and leg swelling.   Skin: Positive for itching and rash.   All other systems reviewed and are negative.      Past Medical History:   Diagnosis Date   • Abnormal uterine bleeding (AUB)    • Anxiety    • Candidiasis of mouth    • Carpal tunnel syndrome    • Endometriosis    • Ganglion    • Mitral valve disorder    • Pelvic pain    • Pubertal menorrhagia    • Tachycardia        Allergies   Allergen Reactions   • Codeine Other (See Comments)     Increased Heart Rate       Past Surgical History:   Procedure Laterality Date   • ABDOMINAL SURGERY     •  SECTION PRIMARY     • DIAGNOSTIC LAPAROSCOPY     • DIAGNOSTIC LAPAROSCOPY N/A 2016    Procedure: DIAGNOSTIC LAPAROSCOPY, FULGERATION OF ENDOMETRIOSIS;  Surgeon: Onel Mora MD;  Location: [facility] OR; "  Service:    • ENDOSCOPY     • GANGLION CYST EXCISION         Family History   Problem Relation Age of Onset   • Diabetes Sister    • Heart disease Maternal Grandmother    • Hypertension Maternal Grandmother        Social History     Social History   • Marital status:      Social History Main Topics   • Smoking status: Never Smoker   • Smokeless tobacco: Never Used   • Alcohol use No   • Drug use: No   • Sexual activity: Yes     Partners: Male     Other Topics Concern   • Not on file           Objective   Physical Exam   Constitutional: She is oriented to person, place, and time. She appears well-developed and well-nourished.   HENT:   Head: Normocephalic and atraumatic.   Right Ear: External ear normal.   Left Ear: External ear normal.   Nose: Nose normal.   Mouth/Throat: Oropharynx is clear and moist. No oropharyngeal exudate.   Eyes: Conjunctivae and EOM are normal. Pupils are equal, round, and reactive to light. Right eye exhibits no discharge. Left eye exhibits no discharge. No scleral icterus.   Neck: Normal range of motion. Neck supple. No JVD present. No tracheal deviation present. No thyromegaly present.   Cardiovascular: Normal rate, regular rhythm, normal heart sounds and intact distal pulses.  Exam reveals no friction rub.    No murmur heard.  Pulmonary/Chest: Effort normal and breath sounds normal. No stridor. No respiratory distress. She has no wheezes. She has no rales.   Abdominal: Soft. Bowel sounds are normal. She exhibits no distension and no mass. There is no tenderness.   Musculoskeletal: Normal range of motion. She exhibits no edema, tenderness or deformity.   Neurological: She is alert and oriented to person, place, and time. She has normal reflexes. She displays normal reflexes. No cranial nerve deficit. Coordination normal.   Skin: Skin is warm and dry. Capillary refill takes less than 2 seconds. Rash noted.   Psychiatric: She has a normal mood and affect. Her behavior is normal.  Judgment and thought content normal.   Nursing note and vitals reviewed.      Procedures           ED Course  ED Course   Comment By Time   Patient does state that she feels much better at this time. Will be discharged. Advised to take benadryl and steroids as prescribed. Return if condition worsens.  John Metzger PA-C 05/08 1950                  [facility]      Final diagnoses:   Allergic reaction to drug, initial encounter            John Metzger PA-C  05/08/18 1954

## 2018-05-08 NOTE — ANESTHESIA PREPROCEDURE EVALUATION
Anesthesia Evaluation     Patient summary reviewed and Nursing notes reviewed   NPO Solid Status: > 8 hours  NPO Liquid Status: > 8 hours           Airway   Mallampati: II  TM distance: >3 FB  Neck ROM: full  no difficulty expected  Dental    (+) poor dentition        Pulmonary - negative pulmonary ROS   (+) decreased breath sounds,   Cardiovascular - negative cardio ROS    Rhythm: regular  Rate: normal        Neuro/Psych- negative ROS  GI/Hepatic/Renal/Endo - negative ROS     Musculoskeletal     (+) chronic pain,   Abdominal    Substance History - negative use     OB/GYN negative ob/gyn ROS         Other - negative ROS                       Anesthesia Plan    ASA 2     general     intravenous induction   Anesthetic plan and risks discussed with patient.  Use of blood products discussed with patient .   Plan discussed with CRNA.

## 2018-05-08 NOTE — H&P
HISTORY    Chief complaint  Abnormal uterine bleeding, pelvic pain    History of present illness  This patient is admitted for diagnostic laparoscopy with D&C.  She has a previous laparoscopic diagnosis of endometriosis and her pain is unremitting and she is also having abnormal uterine bleeding.    Past medical, surgical, obstetrical history  NAD; she has had EGD, D&C, laparoscopy twice,  section, and oral surgery.  She is on Paxil.    Family history  NAD    Social history  NAD    Functional inquiry  NAD                             PHYSICAL EXAMINATION    General  In no acute distress    HEENT  Throat and ears are clear, no masses or nodes were palpable    CVR  Heart sounds are normal with no murmurs, chest is clear to IPPA    GI/  Abdomen is soft with no masses tenderness or organomegaly.  Pelvic examination reveals tenderness over the uterus and in both adnexal areas    MS  No gross bone or joint abnormalities.                                        IMPRESSION  Endometriosis, pelvic pain with abnormal uterine bleeding.                                    TREATMENT PLAN    Laparoscopy with D&C    I explained that when we do a laparoscopy, we use a small instrument that looks like a pen, with a sharp end on it, and a camera inside of it.  We make a very small incision in the abdomen, and we use this instrument to enter the abdominal cavity under direct vision.  We then fill her up with carbon dioxide.  We will then put in extra instruments if necessary.  She understands that when we do this, there is the very small chance of perforation of vessel or viscus, but that is an unavoidable risk and having any problems occur at this point is very rare.  Of course, if it did, we would need to operate to repair any damage.  She understands and accepts this.    I explained that when we do a D&C, we grasp her cervix with a tenaculum, dilate her cervix, and carefully do a  curettage to remove the endometrium.  She understands that there is the possibility of uterine perforation, and that usually if this happens it is not a serious complication, but could be and in very very rare cases, might eventuate in hysterectomy.

## 2018-05-09 LAB
LAB AP CASE REPORT: NORMAL
Lab: NORMAL
PATH REPORT.FINAL DX SPEC: NORMAL

## 2018-06-27 ENCOUNTER — APPOINTMENT (OUTPATIENT)
Dept: LAB | Facility: HOSPITAL | Age: 28
End: 2018-06-27
Attending: OBSTETRICS & GYNECOLOGY

## 2018-06-27 ENCOUNTER — TRANSCRIBE ORDERS (OUTPATIENT)
Dept: ADMINISTRATIVE | Facility: HOSPITAL | Age: 28
End: 2018-06-27

## 2018-06-27 DIAGNOSIS — N91.2 ABSENCE OF MENSTRUATION: Primary | ICD-10-CM

## 2018-06-27 LAB — HCG INTACT+B SERPL-ACNC: <2 MIU/ML (ref 0–5)

## 2018-06-27 PROCEDURE — 84702 CHORIONIC GONADOTROPIN TEST: CPT | Performed by: OBSTETRICS & GYNECOLOGY

## 2018-06-27 PROCEDURE — 36415 COLL VENOUS BLD VENIPUNCTURE: CPT | Performed by: OBSTETRICS & GYNECOLOGY

## 2018-07-25 ENCOUNTER — OFFICE VISIT (OUTPATIENT)
Dept: OBSTETRICS AND GYNECOLOGY | Facility: CLINIC | Age: 28
End: 2018-07-25

## 2018-07-25 VITALS
HEIGHT: 60 IN | SYSTOLIC BLOOD PRESSURE: 102 MMHG | WEIGHT: 90.4 LBS | DIASTOLIC BLOOD PRESSURE: 66 MMHG | BODY MASS INDEX: 17.75 KG/M2

## 2018-07-25 DIAGNOSIS — N80.9 ENDOMETRIOSIS: Primary | ICD-10-CM

## 2018-07-25 DIAGNOSIS — N94.5 SECONDARY DYSMENORRHEA: ICD-10-CM

## 2018-07-25 PROCEDURE — 99203 OFFICE O/P NEW LOW 30 MIN: CPT | Performed by: OBSTETRICS & GYNECOLOGY

## 2018-07-25 NOTE — PROGRESS NOTES
Chief Complaint   Patient presents with   • Endometriosis     patient here for 2nd opinion.  physician has recommended treatment with Lupron.   • Pelvic Pain       Janie Hollis is a 27 y.o. year old  presenting to be seen for a patient-initiated second opinion regarding endometriosis.  This patient had a spontaneous conception with delivery of a female infant via  section in May 2013.  She has been managed gynecologic leave by Dr. Onel Mora.  By history, she has had laparoscopies and D&Cs ×3.  She has been told that she has endometriosis involving the ovaries and peritoneum.  She has had CO2 laser ablation of endometriosis.  She has been told that she has bowel involvement.  She has taken one cycle of Clomid, and developed left lower quadrant pain.  She had a negative pregnancy test 2 days ago.  Dr. Mora has recommended Depo-Lupron injections, 3.75 mg IM.  The patient denies bowel or urinary symptoms.    History   Sexual Activity   • Sexual activity: Yes   • Partners: Male   • Birth control/ protection: None     SCREENING TESTS    Year 2012   Age                         PAP      Neg.                   HPV high risk                         Mammogram                         VANDA score                         Breast MRI                         Lipids                         Vitamin D                         Colonoscopy                         DEXA  Frax (hip/any)                         Ovarian Screen                             No Additional Complaints Reported    The following portions of the patient's history were reviewed and updated as appropriate:vital signs and   She  has a past medical history of Abnormal uterine bleeding (AUB); Anxiety; Candidiasis of mouth; Carpal tunnel syndrome; Endometriosis; Ganglion; Mitral valve disorder; Pelvic pain; Pubertal menorrhagia; and  "Tachycardia.  She  does not have any pertinent problems on file.  She  has a past surgical history that includes  section, low transverse; Ganglion cyst excision; Diagnostic laparoscopy; Laparoscopy (N/A, 2016); Abdominal surgery; Esophagogastroduodenoscopy; and d&c and laparoscopy (N/A, 2018).  Her family history includes Diabetes in her sister; Heart disease in her maternal grandmother; Hypertension in her maternal grandmother.  She  reports that she has never smoked. She has never used smokeless tobacco. She reports that she does not drink alcohol or use drugs.  Current Outpatient Prescriptions   Medication Sig Dispense Refill   • PARoxetine (PAXIL) 30 MG tablet Take 30 mg by mouth Every Morning.       No current facility-administered medications for this visit.      She is allergic to codeine; acetaminophen; and oxycodone hcl..        Review of Systems  A comprehensive review of systems was done.  Constitutional: negative for fever, chills, activity change, appetite change, fatigue and unexpected weight change.  Respiratory: negative  Cardiovascular: negative  Gastrointestinal: negative  Genitourinary:positive for dysmenorrhea  Musculoskeletal:negative  Behavioral/Psych: negative          /66   Ht 152.4 cm (60\")   Wt 41 kg (90 lb 6.4 oz)   LMP 2018 (Exact Date)   BMI 17.66 kg/m²     Physical Exam    General:  alert; cooperative; well developed; well nourished   Skin:  No suspicious lesions seen   Thyroid: normal to inspection and palpation   Lungs:  clear to auscultation bilaterally   Heart:  regular rate and rhythm, S1, S2 normal, no murmur, click, rub or gallop   Breasts:  Not performed.   Abdomen: soft, non-tender; no masses  no umbilical or inginual hernias are present  no hepato-splenomegaly   Pelvis: Clinical staff was present for exam  External genitalia:  normal appearance of the external genitalia including Bartholin's and Marueno's glands.  Vaginal:  normal pink mucosa " without prolapse or lesions.  Cervix:  normal appearance. the os is patent  Uterus:  normal size, shape and consistency. midplane  Adnexa:  no adnexal masses or nodularity   Rectal:  anus visually normal appearing. recto-vaginal exam unremarkable and confirms findings;     Lab Review   CBC results and HCG results    Imaging   No data reviewed    Medical counseling was given to patient and mother for the following topics: diagnostic results, instructions for management, impressions and risks and benefits of treatment options . Total time of the encounter was 33 minute(s) and 26 minute(s)  was spent in face to face counseling.  I have counseled the patient to follow Dr. Mora'advice.  I have counseled her that I would recommend 6 monthly injections of Depo-Lupron, 3.75 mg IM.  I have counseled her that I would immediately attempt to conceive following the Depo-Lupron injections.  I have counseled her that Dr. Dheeraj Bunch would be a reproductive-endocrinologist that she could see for artificial insemination should that be necessary.  I have counseled her to avoid using Clomid until she has completed her Depo-Lupron since she had severe pain associated with its use.  I have counseled her to take a prenatal vitamin daily          ASSESSMENT  Problems Addressed this Visit        Other    Endometriosis - Primary    Secondary dysmenorrhea            PLAN    · Medications prescribed this encounter:  No orders of the defined types were placed in this encounter.  ·  Follow-up with Dr. Mora  · Follow up: PRN  · PNV daily     *Please note that portions of this documentation may have been completed with a voice recognition program.  Efforts were made to edit this dictation, but occasional words may have been mistranscribed.     This note was electronically signed.    VISHNU Hastings MD  July 25, 2018  2:02 PM

## 2019-01-10 ENCOUNTER — HOSPITAL ENCOUNTER (EMERGENCY)
Facility: HOSPITAL | Age: 29
Discharge: HOME | End: 2019-01-11
Attending: EMERGENCY MEDICINE | Admitting: EMERGENCY MEDICINE

## 2019-01-10 DIAGNOSIS — J06.9 URI, ACUTE: Primary | ICD-10-CM

## 2019-01-10 DIAGNOSIS — N80.9 ENDOMETRIOSIS: ICD-10-CM

## 2019-01-10 LAB
ALBUMIN SERPL-MCNC: 4.2 G/DL (ref 3.5–5)
ALBUMIN/GLOB SERPL: 1.7 G/DL (ref 1.5–2.5)
ALP SERPL-CCNC: 66 U/L (ref 35–104)
ALT SERPL W P-5'-P-CCNC: 32 U/L (ref 10–36)
ANION GAP SERPL CALCULATED.3IONS-SCNC: 7.8 MMOL/L (ref 3.6–11.2)
AST SERPL-CCNC: 21 U/L (ref 10–30)
B-HCG UR QL: NEGATIVE
BACTERIA UR QL AUTO: NORMAL /HPF
BASOPHILS # BLD AUTO: 0.04 10*3/MM3 (ref 0–0.3)
BASOPHILS NFR BLD AUTO: 0.3 % (ref 0–2)
BILIRUB SERPL-MCNC: 0.7 MG/DL (ref 0.2–1.8)
BILIRUB UR QL STRIP: NEGATIVE
BUN BLD-MCNC: 8 MG/DL (ref 7–21)
BUN/CREAT SERPL: 14.8 (ref 7–25)
CALCIUM SPEC-SCNC: 8.7 MG/DL (ref 7.7–10)
CHLORIDE SERPL-SCNC: 106 MMOL/L (ref 99–112)
CLARITY UR: CLEAR
CO2 SERPL-SCNC: 27.2 MMOL/L (ref 24.3–31.9)
COLOR UR: YELLOW
CREAT BLD-MCNC: 0.54 MG/DL (ref 0.43–1.29)
DEPRECATED RDW RBC AUTO: 40.5 FL (ref 37–54)
EOSINOPHIL # BLD AUTO: 0.07 10*3/MM3 (ref 0–0.7)
EOSINOPHIL NFR BLD AUTO: 0.6 % (ref 0–5)
ERYTHROCYTE [DISTWIDTH] IN BLOOD BY AUTOMATED COUNT: 14 % (ref 11.5–14.5)
GFR SERPL CREATININE-BSD FRML MDRD: 136 ML/MIN/1.73
GLOBULIN UR ELPH-MCNC: 2.5 GM/DL
GLUCOSE BLD-MCNC: 110 MG/DL (ref 70–110)
GLUCOSE UR STRIP-MCNC: NEGATIVE MG/DL
HCT VFR BLD AUTO: 38.7 % (ref 37–47)
HGB BLD-MCNC: 13.3 G/DL (ref 12–16)
HGB UR QL STRIP.AUTO: ABNORMAL
HYALINE CASTS UR QL AUTO: NORMAL /LPF
IMM GRANULOCYTES # BLD AUTO: 0.04 10*3/MM3 (ref 0–0.03)
IMM GRANULOCYTES NFR BLD AUTO: 0.3 % (ref 0–0.5)
KETONES UR QL STRIP: NEGATIVE
LEUKOCYTE ESTERASE UR QL STRIP.AUTO: NEGATIVE
LIPASE SERPL-CCNC: 40 U/L (ref 13–60)
LYMPHOCYTES # BLD AUTO: 0.95 10*3/MM3 (ref 1–3)
LYMPHOCYTES NFR BLD AUTO: 8.1 % (ref 21–51)
MCH RBC QN AUTO: 27.9 PG (ref 27–33)
MCHC RBC AUTO-ENTMCNC: 34.4 G/DL (ref 33–37)
MCV RBC AUTO: 81.3 FL (ref 80–94)
MONOCYTES # BLD AUTO: 0.73 10*3/MM3 (ref 0.1–0.9)
MONOCYTES NFR BLD AUTO: 6.3 % (ref 0–10)
NEUTROPHILS # BLD AUTO: 9.85 10*3/MM3 (ref 1.4–6.5)
NEUTROPHILS NFR BLD AUTO: 84.4 % (ref 30–70)
NITRITE UR QL STRIP: NEGATIVE
OSMOLALITY SERPL CALC.SUM OF ELEC: 280.2 MOSM/KG (ref 273–305)
PH UR STRIP.AUTO: 6 [PH] (ref 5–8)
PLATELET # BLD AUTO: 235 10*3/MM3 (ref 130–400)
PMV BLD AUTO: 10.4 FL (ref 6–10)
POTASSIUM BLD-SCNC: 3.4 MMOL/L (ref 3.5–5.3)
PROT SERPL-MCNC: 6.7 G/DL (ref 6–8)
PROT UR QL STRIP: NEGATIVE
RBC # BLD AUTO: 4.76 10*6/MM3 (ref 4.2–5.4)
RBC # UR: NORMAL /HPF
REF LAB TEST METHOD: NORMAL
SODIUM BLD-SCNC: 141 MMOL/L (ref 135–153)
SP GR UR STRIP: 1.02 (ref 1–1.03)
SQUAMOUS #/AREA URNS HPF: NORMAL /HPF
UROBILINOGEN UR QL STRIP: ABNORMAL
WBC NRBC COR # BLD: 11.68 10*3/MM3 (ref 4.5–12.5)
WBC UR QL AUTO: NORMAL /HPF

## 2019-01-10 PROCEDURE — 99284 EMERGENCY DEPT VISIT MOD MDM: CPT

## 2019-01-10 PROCEDURE — 81025 URINE PREGNANCY TEST: CPT | Performed by: EMERGENCY MEDICINE

## 2019-01-10 PROCEDURE — 96374 THER/PROPH/DIAG INJ IV PUSH: CPT

## 2019-01-10 PROCEDURE — 96375 TX/PRO/DX INJ NEW DRUG ADDON: CPT

## 2019-01-10 PROCEDURE — 25010000002 ONDANSETRON PER 1 MG: Performed by: EMERGENCY MEDICINE

## 2019-01-10 PROCEDURE — 85025 COMPLETE CBC W/AUTO DIFF WBC: CPT | Performed by: EMERGENCY MEDICINE

## 2019-01-10 PROCEDURE — 25010000002 MORPHINE PER 10 MG: Performed by: EMERGENCY MEDICINE

## 2019-01-10 PROCEDURE — 83690 ASSAY OF LIPASE: CPT | Performed by: EMERGENCY MEDICINE

## 2019-01-10 PROCEDURE — 96361 HYDRATE IV INFUSION ADD-ON: CPT

## 2019-01-10 PROCEDURE — 81001 URINALYSIS AUTO W/SCOPE: CPT | Performed by: EMERGENCY MEDICINE

## 2019-01-10 PROCEDURE — 80053 COMPREHEN METABOLIC PANEL: CPT | Performed by: EMERGENCY MEDICINE

## 2019-01-10 RX ORDER — HYDROCODONE BITARTRATE AND ACETAMINOPHEN 5; 325 MG/1; MG/1
1 TABLET ORAL ONCE
Status: COMPLETED | OUTPATIENT
Start: 2019-01-10 | End: 2019-01-11

## 2019-01-10 RX ORDER — ONDANSETRON 2 MG/ML
4 INJECTION INTRAMUSCULAR; INTRAVENOUS ONCE
Status: COMPLETED | OUTPATIENT
Start: 2019-01-10 | End: 2019-01-10

## 2019-01-10 RX ADMIN — ONDANSETRON 4 MG: 2 INJECTION, SOLUTION INTRAMUSCULAR; INTRAVENOUS at 23:28

## 2019-01-10 RX ADMIN — SODIUM CHLORIDE 1000 ML: 9 INJECTION, SOLUTION INTRAVENOUS at 23:28

## 2019-01-10 RX ADMIN — MORPHINE SULFATE 3 MG: 4 INJECTION INTRAVENOUS at 23:28

## 2019-01-11 VITALS
TEMPERATURE: 98 F | WEIGHT: 91 LBS | OXYGEN SATURATION: 99 % | HEART RATE: 97 BPM | HEIGHT: 60 IN | DIASTOLIC BLOOD PRESSURE: 57 MMHG | BODY MASS INDEX: 17.87 KG/M2 | SYSTOLIC BLOOD PRESSURE: 110 MMHG | RESPIRATION RATE: 16 BRPM

## 2019-01-11 RX ORDER — ONDANSETRON 4 MG/1
4 TABLET, FILM COATED ORAL EVERY 6 HOURS PRN
Qty: 12 TABLET | Refills: 0 | Status: SHIPPED | OUTPATIENT
Start: 2019-01-11 | End: 2019-09-27

## 2019-01-11 RX ORDER — HYDROCODONE BITARTRATE AND ACETAMINOPHEN 5; 325 MG/1; MG/1
1 TABLET ORAL EVERY 8 HOURS PRN
Qty: 12 TABLET | Refills: 0 | Status: SHIPPED | OUTPATIENT
Start: 2019-01-11 | End: 2019-07-01

## 2019-01-11 RX ADMIN — HYDROCODONE BITARTRATE AND ACETAMINOPHEN 1 TABLET: 5; 325 TABLET ORAL at 00:05

## 2019-01-11 NOTE — ED PROVIDER NOTES
Subjective   Patient presents to ER with abdominal pain        Abdominal Pain   Pain location:  Suprapubic  Pain radiates to:  Does not radiate  Pain severity:  Moderate  Onset quality:  Gradual  Progression:  Worsening  Chronicity:  Recurrent  Context comment:  Endometriosis  Relieved by:  Nothing  Worsened by:  Nothing  Ineffective treatments:  None tried  Associated symptoms: fatigue        Review of Systems   Constitutional: Positive for activity change, appetite change and fatigue.   HENT: Negative.    Eyes: Negative.    Respiratory: Negative.    Cardiovascular: Negative.    Gastrointestinal: Positive for abdominal pain.   Endocrine: Negative.    Genitourinary: Negative.    Allergic/Immunologic: Negative.    Neurological: Negative.    Hematological: Negative.    Psychiatric/Behavioral: Negative.        Past Medical History:   Diagnosis Date   • Abnormal uterine bleeding (AUB)    • Anxiety    • Candidiasis of mouth    • Carpal tunnel syndrome    • Endometriosis    • Ganglion    • Mitral valve disorder    • Pelvic pain    • Pubertal menorrhagia    • Tachycardia        Allergies   Allergen Reactions   • Codeine Other (See Comments)     Increased Heart Rate   • Acetaminophen Swelling   • Oxycodone Hcl Swelling       Past Surgical History:   Procedure Laterality Date   • ABDOMINAL SURGERY     •  SECTION PRIMARY     • D&C AND LAPAROSCOPY N/A 2018    Procedure: DILATATION AND CURETTAGE AND DIAGNOSTIC LAPAROSCOPY with fulgaration of endometriosis;  Surgeon: Onel Mora MD;  Location: [facility];  Service: Obstetrics/Gynecology   • DIAGNOSTIC LAPAROSCOPY     • DIAGNOSTIC LAPAROSCOPY N/A 2016    Procedure: DIAGNOSTIC LAPAROSCOPY, FULGERATION OF ENDOMETRIOSIS;  Surgeon: Onel Mora MD;  Location: [facility];  Service:    • ENDOSCOPY     • GANGLION CYST EXCISION         Family History   Problem Relation Age of Onset   • Diabetes Sister    • Heart disease Maternal Grandmother    • Hypertension  Maternal Grandmother        Social History     Socioeconomic History   • Marital status:      Spouse name: Not on file   • Number of children: Not on file   • Years of education: Not on file   • Highest education level: Not on file   Tobacco Use   • Smoking status: Never Smoker   • Smokeless tobacco: Never Used   Substance and Sexual Activity   • Alcohol use: No   • Drug use: No   • Sexual activity: Yes     Partners: Male     Birth control/protection: None           Objective   Physical Exam   Constitutional: She appears well-developed and well-nourished.   HENT:   Head: Normocephalic and atraumatic.   Mouth/Throat: Oropharynx is clear and moist.   Eyes: EOM are normal. Pupils are equal, round, and reactive to light.   Cardiovascular: Normal rate and normal heart sounds.   Abdominal: Bowel sounds are decreased.   Distended, suprapubic tender   Skin: Skin is warm.   Nursing note and vitals reviewed.      Procedures           ED Course                  MDM      Final diagnoses:   URI, acute   Endometriosis            Herrera Worley MD  01/11/19 0028

## 2019-01-11 NOTE — ED NOTES
Patient reports that she has been having lower abdominal pain x 2 days but worsened this morning. Reports that she has endometriosis and has been on her menstrual cycle for the past week. Reports that she has been nauseated but has had no vomiting.      Ashley Blandon RN  01/10/19 [id number]

## 2019-06-24 ENCOUNTER — PREP FOR SURGERY (OUTPATIENT)
Dept: OTHER | Facility: HOSPITAL | Age: 29
End: 2019-06-24

## 2019-06-24 DIAGNOSIS — R10.2 PELVIC PAIN: Primary | ICD-10-CM

## 2019-06-24 RX ORDER — CEFOXITIN SODIUM 2 G/50ML
2 INJECTION, SOLUTION INTRAVENOUS
Status: CANCELLED | OUTPATIENT
Start: 2019-06-24

## 2019-06-24 RX ORDER — SODIUM CHLORIDE 0.9 % (FLUSH) 0.9 %
3-10 SYRINGE (ML) INJECTION AS NEEDED
Status: CANCELLED | OUTPATIENT
Start: 2019-06-24

## 2019-07-01 ENCOUNTER — APPOINTMENT (OUTPATIENT)
Dept: PREADMISSION TESTING | Facility: HOSPITAL | Age: 29
End: 2019-07-01

## 2019-07-01 DIAGNOSIS — R10.2 PELVIC PAIN: ICD-10-CM

## 2019-07-01 LAB
ABO GROUP BLD: NORMAL
ANION GAP SERPL CALCULATED.3IONS-SCNC: 13.4 MMOL/L (ref 5–15)
B-HCG UR QL: NEGATIVE
BASOPHILS # BLD AUTO: 0.07 10*3/MM3 (ref 0–0.2)
BASOPHILS NFR BLD AUTO: 0.9 % (ref 0–1.5)
BLD GP AB SCN SERPL QL: NEGATIVE
BUN BLD-MCNC: 6 MG/DL (ref 6–20)
BUN/CREAT SERPL: 9.7 (ref 7–25)
CALCIUM SPEC-SCNC: 9.5 MG/DL (ref 8.6–10.5)
CHLORIDE SERPL-SCNC: 103 MMOL/L (ref 98–107)
CO2 SERPL-SCNC: 25.6 MMOL/L (ref 22–29)
CREAT BLD-MCNC: 0.62 MG/DL (ref 0.57–1)
DEPRECATED RDW RBC AUTO: 41.3 FL (ref 37–54)
EOSINOPHIL # BLD AUTO: 0.11 10*3/MM3 (ref 0–0.4)
EOSINOPHIL NFR BLD AUTO: 1.4 % (ref 0.3–6.2)
ERYTHROCYTE [DISTWIDTH] IN BLOOD BY AUTOMATED COUNT: 14 % (ref 12.3–15.4)
GFR SERPL CREATININE-BSD FRML MDRD: 116 ML/MIN/1.73
GLUCOSE BLD-MCNC: 88 MG/DL (ref 65–99)
HCT VFR BLD AUTO: 39.5 % (ref 34–46.6)
HGB BLD-MCNC: 13.7 G/DL (ref 12–15.9)
IMM GRANULOCYTES # BLD AUTO: 0.01 10*3/MM3 (ref 0–0.05)
IMM GRANULOCYTES NFR BLD AUTO: 0.1 % (ref 0–0.5)
LYMPHOCYTES # BLD AUTO: 3.12 10*3/MM3 (ref 0.7–3.1)
LYMPHOCYTES NFR BLD AUTO: 40.4 % (ref 19.6–45.3)
MCH RBC QN AUTO: 28.1 PG (ref 26.6–33)
MCHC RBC AUTO-ENTMCNC: 34.7 G/DL (ref 31.5–35.7)
MCV RBC AUTO: 81.1 FL (ref 79–97)
MONOCYTES # BLD AUTO: 0.8 10*3/MM3 (ref 0.1–0.9)
MONOCYTES NFR BLD AUTO: 10.4 % (ref 5–12)
NEUTROPHILS # BLD AUTO: 3.61 10*3/MM3 (ref 1.7–7)
NEUTROPHILS NFR BLD AUTO: 46.8 % (ref 42.7–76)
PLATELET # BLD AUTO: 237 10*3/MM3 (ref 140–450)
PMV BLD AUTO: 11.1 FL (ref 6–12)
POTASSIUM BLD-SCNC: 3.2 MMOL/L (ref 3.5–5.2)
RBC # BLD AUTO: 4.87 10*6/MM3 (ref 3.77–5.28)
RH BLD: POSITIVE
SODIUM BLD-SCNC: 142 MMOL/L (ref 136–145)
T&S EXPIRATION DATE: NORMAL
WBC NRBC COR # BLD: 7.72 10*3/MM3 (ref 3.4–10.8)

## 2019-07-01 PROCEDURE — 86901 BLOOD TYPING SEROLOGIC RH(D): CPT | Performed by: NURSE PRACTITIONER

## 2019-07-01 PROCEDURE — 86900 BLOOD TYPING SEROLOGIC ABO: CPT | Performed by: NURSE PRACTITIONER

## 2019-07-01 PROCEDURE — 85025 COMPLETE CBC W/AUTO DIFF WBC: CPT | Performed by: NURSE PRACTITIONER

## 2019-07-01 PROCEDURE — 80048 BASIC METABOLIC PNL TOTAL CA: CPT | Performed by: NURSE PRACTITIONER

## 2019-07-01 PROCEDURE — 81025 URINE PREGNANCY TEST: CPT | Performed by: NURSE PRACTITIONER

## 2019-07-01 PROCEDURE — 86850 RBC ANTIBODY SCREEN: CPT | Performed by: NURSE PRACTITIONER

## 2019-07-01 PROCEDURE — 36415 COLL VENOUS BLD VENIPUNCTURE: CPT

## 2019-07-01 RX ORDER — FLUOXETINE HYDROCHLORIDE 20 MG/1
20 CAPSULE ORAL DAILY
COMMUNITY
End: 2019-12-14

## 2019-07-01 NOTE — H&P
HISTORY    Chief complaint  Pelvic pain, endometriosis    History of present illness  This is a 28 year old patient who has a history of endometriosis.  She underwent laparoscopy with cautery of endometriosis and lysis of adhesions October 2015.  She had another laparoscopy December 2016 and she had extensive endometriosis, with Ojhn-Masters windows on the right and left side, with endometriosis all away across the back of the broad ligament.  Postoperatively, she was maintained on Depo-Provera and did pretty well.  She had another laparoscopy May 2018 and she had extensive brown endometriosis.  Past medical, surgical, obstetrical history  NAD; as above    Family history  NAD    Social history  NAD    Functional inquiry  NAD                             PHYSICAL EXAMINATION    General  In no acute distress    HEENT  Throat and ears are clear, no masses or nodes were palpable    CVR  Heart sounds are normal with no murmurs, chest is clear to IPPA    GI/  Abdomen is soft with no masses tenderness or organomegaly.  Pelvic examination reveals tenderness over the uterus and in both adnexal areas    MS  No gross bone or joint abnormalities.                                        IMPRESSION  Recurrent endometriosis                                    TREATMENT PLAN    Diagnostic laparoscopy    I explained that when we do a laparoscopy, we use a small instrument that looks like a pen, with a sharp end on it, and a camera inside of it.  We make a very small incision in the abdomen, and we use this instrument to enter the abdominal cavity under direct vision.  We then fill her up with carbon dioxide.  We will then put in extra instruments if necessary.  She understands that when we do this, there is the very small chance of perforation of vessel or viscus, but that is an unavoidable risk and having any problems occur at this point is very  rare.  Of course, if it did, we would need to operate to repair any damage.  She understands and accepts this.

## 2019-07-01 NOTE — DISCHARGE INSTRUCTIONS
You are scheduled to arrive for your procedure at 0730    TAKE the following medications the morning of surgery:  All heart or blood pressure medications    Please discontinue all blood thinners and anticoagulants (except aspirin) prior to surgery as per your surgeon and cardiologist instructions.  Aspirin may be continued up to the day prior to surgery.    HOLD all diabetic medications the morning of surgery as order by physician.    Please follow instructions on use of prep cloths provided by nurse. Return instruction sheet with stickers attached to pre-op nurse on day of surgery.    General Instructions:  • Do NOT eat or drink after midnight which includes water, mints, or gum.  • You may brush your teeth. Dental appliances that are removable must be taken out day of surgery.  • Do NOT smoke, chew tobacco, or drink alcohol within 24 hours prior to surgery.  • Bring medications in original bottles, any inhalers and if applicable your C-PAP/BI-PAP machine  • Bring any papers given to you in the doctor’s office  • Wear clean, comfortable clothes and socks  • Do NOT wear contact lenses or make-up or dark nail polish.  Bring a case for your glasses if applicable.  • Bring crutches or walker if applicable  • Leave all other valuables and jewelry at home  • If you were given a blood bank armband, continue to wear it until discharged.    Preventing a Surgical Site Infection:  • Shower the night before surgery (unless instructed otherwise) using a fresh bar of anti-bacterial soap (such as Dial) and clean washcloth.  Dry with a clean towel and dress in clean clothing.  • For 2 to 3 days before surgery, avoid shaving with a razor near where you will have surgery because the razor can irritate skin and make it easier to develop an infection.  Ask your surgeon if you will be receiving antibiotics prior to surgery.  • Make sure you, your family, and all healthcare providers clean their hands with soap and water or an  alcohol-based hand  before caring for you or your wound.  • If at all possible, quit smoking as many days before surgery as you can.    Day of Surgery:  Upon arrival, a pre-op nurse and anesthesiologist will review your health history, obtain vital signs, and answer questions you may have.  The only belongings needed at this time will be your home medications and if applicable you C-PAP/BI-PAP machine.  If you are staying overnight, your family can leave the rest of your belongings in the car and bring them to your room later.  A pre-op nurse will start an IV and you may receive medication in preparation for surgery.  Due to patient privacy and limited space, only one member of your family will be able to accompany you in the pre-op area.  While you are in surgery your family should notify the waiting room  if they leave the waiting room area and provide a contact number.  Please be aware that surgery does come with discomfort.  We want to make every effort to control your discomfort so please discuss any uncontrolled symptoms with your nurse.  Your doctor will most likely have prescribed pain medications.  If you are going home after surgery you will receive individualized written care instructions before being discharged.  A responsible adult must drive you to and from the hospital on the day of surgery and stay with you for 24 hours.  If you are staying overnight following surgery, you will be transported to your hospital room following the recovery period.

## 2019-07-02 ENCOUNTER — ANESTHESIA EVENT (OUTPATIENT)
Dept: PERIOP | Facility: HOSPITAL | Age: 29
End: 2019-07-02

## 2019-07-02 ENCOUNTER — ANESTHESIA (OUTPATIENT)
Dept: PERIOP | Facility: HOSPITAL | Age: 29
End: 2019-07-02

## 2019-07-02 ENCOUNTER — HOSPITAL ENCOUNTER (OUTPATIENT)
Facility: HOSPITAL | Age: 29
Setting detail: HOSPITAL OUTPATIENT SURGERY
Discharge: HOME | End: 2019-07-02
Attending: OBSTETRICS & GYNECOLOGY | Admitting: OBSTETRICS & GYNECOLOGY

## 2019-07-02 ENCOUNTER — APPOINTMENT (OUTPATIENT)
Dept: GENERAL RADIOLOGY | Facility: HOSPITAL | Age: 29
End: 2019-07-02

## 2019-07-02 VITALS
HEART RATE: 61 BPM | RESPIRATION RATE: 16 BRPM | SYSTOLIC BLOOD PRESSURE: 98 MMHG | BODY MASS INDEX: 17.56 KG/M2 | DIASTOLIC BLOOD PRESSURE: 57 MMHG | TEMPERATURE: 97.3 F | OXYGEN SATURATION: 100 % | HEIGHT: 61 IN | WEIGHT: 93 LBS

## 2019-07-02 DIAGNOSIS — R10.2 PELVIC PAIN: ICD-10-CM

## 2019-07-02 PROCEDURE — 25010000002 DEXAMETHASONE PER 1 MG: Performed by: NURSE ANESTHETIST, CERTIFIED REGISTERED

## 2019-07-02 PROCEDURE — 25010000002 KETOROLAC TROMETHAMINE PER 15 MG: Performed by: NURSE ANESTHETIST, CERTIFIED REGISTERED

## 2019-07-02 PROCEDURE — 25010000002 ONDANSETRON PER 1 MG: Performed by: NURSE ANESTHETIST, CERTIFIED REGISTERED

## 2019-07-02 PROCEDURE — 25010000002 FENTANYL CITRATE (PF) 100 MCG/2ML SOLUTION: Performed by: NURSE ANESTHETIST, CERTIFIED REGISTERED

## 2019-07-02 PROCEDURE — 25010000002 MIDAZOLAM PER 1 MG: Performed by: NURSE ANESTHETIST, CERTIFIED REGISTERED

## 2019-07-02 PROCEDURE — 25010000002 PROPOFOL 10 MG/ML EMULSION: Performed by: NURSE ANESTHETIST, CERTIFIED REGISTERED

## 2019-07-02 PROCEDURE — 25010000002 NEOSTIGMINE 10 MG/10ML SOLUTION: Performed by: NURSE ANESTHETIST, CERTIFIED REGISTERED

## 2019-07-02 PROCEDURE — 25010000002 CEFOXITIN SODIUM-DEXTROSE 2-2.2 GM-%(50ML) RECONSTITUTED SOLUTION: Performed by: NURSE PRACTITIONER

## 2019-07-02 RX ORDER — MIDAZOLAM HYDROCHLORIDE 1 MG/ML
1 INJECTION INTRAMUSCULAR; INTRAVENOUS
Status: DISCONTINUED | OUTPATIENT
Start: 2019-07-02 | End: 2019-07-02 | Stop reason: HOSPADM

## 2019-07-02 RX ORDER — SODIUM CHLORIDE 0.9 % (FLUSH) 0.9 %
3 SYRINGE (ML) INJECTION EVERY 12 HOURS SCHEDULED
Status: DISCONTINUED | OUTPATIENT
Start: 2019-07-02 | End: 2019-07-02 | Stop reason: HOSPADM

## 2019-07-02 RX ORDER — GLYCOPYRROLATE 0.2 MG/ML
INJECTION INTRAMUSCULAR; INTRAVENOUS AS NEEDED
Status: DISCONTINUED | OUTPATIENT
Start: 2019-07-02 | End: 2019-07-02 | Stop reason: SURG

## 2019-07-02 RX ORDER — OXYCODONE HYDROCHLORIDE AND ACETAMINOPHEN 5; 325 MG/1; MG/1
1 TABLET ORAL EVERY 4 HOURS PRN
Qty: 30 TABLET | Refills: 0 | Status: SHIPPED | OUTPATIENT
Start: 2019-07-02 | End: 2019-09-27

## 2019-07-02 RX ORDER — FENTANYL CITRATE 50 UG/ML
INJECTION, SOLUTION INTRAMUSCULAR; INTRAVENOUS AS NEEDED
Status: DISCONTINUED | OUTPATIENT
Start: 2019-07-02 | End: 2019-07-02 | Stop reason: SURG

## 2019-07-02 RX ORDER — FAMOTIDINE 10 MG/ML
INJECTION, SOLUTION INTRAVENOUS AS NEEDED
Status: DISCONTINUED | OUTPATIENT
Start: 2019-07-02 | End: 2019-07-02 | Stop reason: SURG

## 2019-07-02 RX ORDER — FENTANYL CITRATE 50 UG/ML
50 INJECTION, SOLUTION INTRAMUSCULAR; INTRAVENOUS
Status: DISCONTINUED | OUTPATIENT
Start: 2019-07-02 | End: 2019-07-02 | Stop reason: HOSPADM

## 2019-07-02 RX ORDER — PROPOFOL 10 MG/ML
VIAL (ML) INTRAVENOUS AS NEEDED
Status: DISCONTINUED | OUTPATIENT
Start: 2019-07-02 | End: 2019-07-02 | Stop reason: SURG

## 2019-07-02 RX ORDER — SODIUM CHLORIDE 0.9 % (FLUSH) 0.9 %
3-10 SYRINGE (ML) INJECTION AS NEEDED
Status: DISCONTINUED | OUTPATIENT
Start: 2019-07-02 | End: 2019-07-02 | Stop reason: HOSPADM

## 2019-07-02 RX ORDER — CEFOXITIN SODIUM 2 G/50ML
2 INJECTION, SOLUTION INTRAVENOUS
Status: COMPLETED | OUTPATIENT
Start: 2019-07-02 | End: 2019-07-02

## 2019-07-02 RX ORDER — IPRATROPIUM BROMIDE AND ALBUTEROL SULFATE 2.5; .5 MG/3ML; MG/3ML
3 SOLUTION RESPIRATORY (INHALATION) ONCE AS NEEDED
Status: DISCONTINUED | OUTPATIENT
Start: 2019-07-02 | End: 2019-07-02 | Stop reason: HOSPADM

## 2019-07-02 RX ORDER — MIDAZOLAM HYDROCHLORIDE 1 MG/ML
INJECTION INTRAMUSCULAR; INTRAVENOUS AS NEEDED
Status: DISCONTINUED | OUTPATIENT
Start: 2019-07-02 | End: 2019-07-02 | Stop reason: SURG

## 2019-07-02 RX ORDER — BUPIVACAINE HYDROCHLORIDE AND EPINEPHRINE 5; 5 MG/ML; UG/ML
INJECTION, SOLUTION EPIDURAL; INTRACAUDAL; PERINEURAL AS NEEDED
Status: DISCONTINUED | OUTPATIENT
Start: 2019-07-02 | End: 2019-07-02 | Stop reason: HOSPADM

## 2019-07-02 RX ORDER — LIDOCAINE HYDROCHLORIDE 20 MG/ML
INJECTION, SOLUTION INFILTRATION; PERINEURAL AS NEEDED
Status: DISCONTINUED | OUTPATIENT
Start: 2019-07-02 | End: 2019-07-02 | Stop reason: SURG

## 2019-07-02 RX ORDER — ONDANSETRON 2 MG/ML
INJECTION INTRAMUSCULAR; INTRAVENOUS AS NEEDED
Status: DISCONTINUED | OUTPATIENT
Start: 2019-07-02 | End: 2019-07-02 | Stop reason: SURG

## 2019-07-02 RX ORDER — MAGNESIUM HYDROXIDE 1200 MG/15ML
LIQUID ORAL AS NEEDED
Status: DISCONTINUED | OUTPATIENT
Start: 2019-07-02 | End: 2019-07-02 | Stop reason: HOSPADM

## 2019-07-02 RX ORDER — MEPERIDINE HYDROCHLORIDE 25 MG/ML
12.5 INJECTION INTRAMUSCULAR; INTRAVENOUS; SUBCUTANEOUS
Status: DISCONTINUED | OUTPATIENT
Start: 2019-07-02 | End: 2019-07-02 | Stop reason: HOSPADM

## 2019-07-02 RX ORDER — ROCURONIUM BROMIDE 10 MG/ML
INJECTION, SOLUTION INTRAVENOUS AS NEEDED
Status: DISCONTINUED | OUTPATIENT
Start: 2019-07-02 | End: 2019-07-02 | Stop reason: SURG

## 2019-07-02 RX ORDER — MIDAZOLAM HYDROCHLORIDE 1 MG/ML
2 INJECTION INTRAMUSCULAR; INTRAVENOUS
Status: DISCONTINUED | OUTPATIENT
Start: 2019-07-02 | End: 2019-07-02 | Stop reason: HOSPADM

## 2019-07-02 RX ORDER — SODIUM CHLORIDE, SODIUM LACTATE, POTASSIUM CHLORIDE, CALCIUM CHLORIDE 600; 310; 30; 20 MG/100ML; MG/100ML; MG/100ML; MG/100ML
125 INJECTION, SOLUTION INTRAVENOUS CONTINUOUS
Status: DISCONTINUED | OUTPATIENT
Start: 2019-07-02 | End: 2019-07-02 | Stop reason: HOSPADM

## 2019-07-02 RX ORDER — ONDANSETRON 2 MG/ML
4 INJECTION INTRAMUSCULAR; INTRAVENOUS ONCE AS NEEDED
Status: DISCONTINUED | OUTPATIENT
Start: 2019-07-02 | End: 2019-07-02 | Stop reason: HOSPADM

## 2019-07-02 RX ORDER — DEXAMETHASONE SODIUM PHOSPHATE 4 MG/ML
INJECTION, SOLUTION INTRA-ARTICULAR; INTRALESIONAL; INTRAMUSCULAR; INTRAVENOUS; SOFT TISSUE AS NEEDED
Status: DISCONTINUED | OUTPATIENT
Start: 2019-07-02 | End: 2019-07-02 | Stop reason: SURG

## 2019-07-02 RX ORDER — MORPHINE SULFATE 15 MG/1
15 TABLET, FILM COATED, EXTENDED RELEASE ORAL EVERY 4 HOURS PRN
Qty: 10 TABLET | Refills: 0 | Status: SHIPPED | OUTPATIENT
Start: 2019-07-02 | End: 2019-09-27

## 2019-07-02 RX ORDER — KETOROLAC TROMETHAMINE 30 MG/ML
INJECTION, SOLUTION INTRAMUSCULAR; INTRAVENOUS AS NEEDED
Status: DISCONTINUED | OUTPATIENT
Start: 2019-07-02 | End: 2019-07-02 | Stop reason: SURG

## 2019-07-02 RX ORDER — NEOSTIGMINE METHYLSULFATE 1 MG/ML
INJECTION, SOLUTION INTRAVENOUS AS NEEDED
Status: DISCONTINUED | OUTPATIENT
Start: 2019-07-02 | End: 2019-07-02 | Stop reason: SURG

## 2019-07-02 RX ADMIN — FAMOTIDINE 20 MG: 10 INJECTION, SOLUTION INTRAVENOUS at 09:43

## 2019-07-02 RX ADMIN — DEXAMETHASONE SODIUM PHOSPHATE 8 MG: 4 INJECTION, SOLUTION INTRAMUSCULAR; INTRAVENOUS at 09:52

## 2019-07-02 RX ADMIN — SODIUM CHLORIDE, POTASSIUM CHLORIDE, SODIUM LACTATE AND CALCIUM CHLORIDE: 600; 310; 30; 20 INJECTION, SOLUTION INTRAVENOUS at 10:01

## 2019-07-02 RX ADMIN — ONDANSETRON 4 MG: 2 INJECTION, SOLUTION INTRAMUSCULAR; INTRAVENOUS at 10:35

## 2019-07-02 RX ADMIN — LIDOCAINE HYDROCHLORIDE 60 MG: 20 INJECTION, SOLUTION INFILTRATION; PERINEURAL at 09:43

## 2019-07-02 RX ADMIN — GLYCOPYRROLATE 0.4 MG: 0.2 INJECTION, SOLUTION INTRAMUSCULAR; INTRAVENOUS at 10:11

## 2019-07-02 RX ADMIN — FENTANYL CITRATE 50 MCG: 50 INJECTION INTRAMUSCULAR; INTRAVENOUS at 10:40

## 2019-07-02 RX ADMIN — NEOSTIGMINE METHYLSULFATE 3 MG: 1 INJECTION, SOLUTION INTRAVENOUS at 10:11

## 2019-07-02 RX ADMIN — PROPOFOL 150 MG: 10 INJECTION, EMULSION INTRAVENOUS at 09:52

## 2019-07-02 RX ADMIN — CEFOXITIN SODIUM 2 G: 2 INJECTION, SOLUTION INTRAVENOUS at 09:43

## 2019-07-02 RX ADMIN — FENTANYL CITRATE 100 MCG: 50 INJECTION INTRAMUSCULAR; INTRAVENOUS at 09:43

## 2019-07-02 RX ADMIN — ROCURONIUM BROMIDE 25 MG: 10 INJECTION INTRAVENOUS at 09:52

## 2019-07-02 RX ADMIN — KETOROLAC TROMETHAMINE 30 MG: 30 INJECTION, SOLUTION INTRAMUSCULAR; INTRAVENOUS at 10:15

## 2019-07-02 RX ADMIN — MIDAZOLAM HYDROCHLORIDE 2 MG: 1 INJECTION, SOLUTION INTRAMUSCULAR; INTRAVENOUS at 09:43

## 2019-07-02 RX ADMIN — ONDANSETRON 4 MG: 2 INJECTION, SOLUTION INTRAMUSCULAR; INTRAVENOUS at 09:52

## 2019-07-02 RX ADMIN — SODIUM CHLORIDE, POTASSIUM CHLORIDE, SODIUM LACTATE AND CALCIUM CHLORIDE 125 ML/HR: 600; 310; 30; 20 INJECTION, SOLUTION INTRAVENOUS at 08:06

## 2019-07-02 NOTE — ANESTHESIA POSTPROCEDURE EVALUATION
Patient: Janie Hollis    Procedure Summary     Date:  07/02/19 Room / Location:  [facility] OR 04 /  [facility]    Anesthesia Start:  0943 Anesthesia Stop:  1027    Procedure:  DIAGNOSTIC LAPAROSCOPY. FULGURATION OF ENDOMETRIOSIS, (N/A Abdomen) Diagnosis:  (R10.2)    Surgeon:  Onel Mora MD Provider:  Berry Quispe MD    Anesthesia Type:  general ASA Status:  2          Anesthesia Type: general  Last vitals  BP   90/43 (07/02/19 1035)   Temp   97 °F (36.1 °C) (07/02/19 1025)   Pulse   74 (07/02/19 1035)   Resp   16 (07/02/19 1035)     SpO2   100 % (07/02/19 1035)     Post Anesthesia Care and Evaluation    Patient location during evaluation: PHASE II  Patient participation: complete - patient participated  Level of consciousness: awake and alert  Pain score: 1  Pain management: adequate  Airway patency: patent  Anesthetic complications: No anesthetic complications  PONV Status: controlled  Cardiovascular status: acceptable  Respiratory status: acceptable  Hydration status: acceptable

## 2019-07-02 NOTE — H&P
HISTORY    Chief complaint  Pelvic pain, endometriosis    History of present illness  This is a 28 year old patient who has a history of endometriosis.  She underwent laparoscopy with cautery of endometriosis and lysis of adhesions October 2015.  She had another laparoscopy December 2016 and she had extensive endometriosis, with John-Masters windows on the right and left side, with endometriosis all away across the back of the broad ligament.  Postoperatively, she was maintained on Depo-Provera and did pretty well.  She had another laparoscopy May 2018 and she had extensive brown endometriosis.  Past medical, surgical, obstetrical history  NAD; as above    Family history  NAD    Social history  NAD    Functional inquiry  NAD                             PHYSICAL EXAMINATION    General  In no acute distress    HEENT  Throat and ears are clear, no masses or nodes were palpable    CVR  Heart sounds are normal with no murmurs, chest is clear to IPPA    GI/  Abdomen is soft with no masses tenderness or organomegaly.  Pelvic examination reveals tenderness over the uterus and in both adnexal areas    MS  No gross bone or joint abnormalities.                                        IMPRESSION  Recurrent endometriosis                                    TREATMENT PLAN    Diagnostic laparoscopy    I explained that when we do a laparoscopy, we use a small instrument that looks like a pen, with a sharp end on it, and a camera inside of it.  We make a very small incision in the abdomen, and we use this instrument to enter the abdominal cavity under direct vision.  We then fill her up with carbon dioxide.  We will then put in extra instruments if necessary.  She understands that when we do this, there is the very small chance of perforation of vessel or viscus, but that is an unavoidable risk and having any problems occur at this point is very  rare.  Of course, if it did, we would need to operate to repair any damage.  She understands and accepts this.

## 2019-07-02 NOTE — ANESTHESIA PROCEDURE NOTES
Airway  Urgency: elective    Date/Time: 7/2/2019 9:52 AM  Airway not difficult    General Information and Staff    Patient location during procedure: OR  Anesthesiologist: Berry Quispe MD  CRNA: Jina Mcgill CRNA    Indications and Patient Condition  Indications for airway management: airway protection    Preoxygenated: yes  MILS maintained throughout  Mask difficulty assessment: 2 - vent by mask + OA or adjuvant +/- NMBA    Final Airway Details  Final airway type: endotracheal airway      Successful airway: ETT  Cuffed: yes   Successful intubation technique: direct laryngoscopy  Endotracheal tube insertion site: oral  Blade: Marvin  Blade size: 3  ETT size (mm): 7.0  Cormack-Lehane Classification: grade IIa - partial view of glottis  Placement verified by: chest auscultation, capnometry and palpation of cuff   Cuff volume (mL): 6  Measured from: lips  ETT to lips (cm): 22  Number of attempts at approach: 1    Additional Comments  Dentition as preop. No complications noted. Patient tolerated well.  ETT secured.

## 2019-07-02 NOTE — OP NOTE
OPERATIVE NOTE    Preoperative diagnosis: Pelvic pain, endometriosis    Postoperative diagnosis: Same as above    Procedure performed: Diagnostic laparoscopy with cautery of endometriosis    Specimens removed: None    Anesthesia: General    Surgeon: Dr. Onel Mora    Assistant: None    Estimated blood loss: 10 cc    Blood products: None    Grafts/implants: None    Complications: None    Description of the procedure: This patient was taken to the operating room, placed on the operating table in the supine position, and prepped and draped in usual fashion for laparoscopic surgery.  A Ortiz catheter was placed in her bladder.  A Hulka tenaculum was placed in the uterus.  Following this, a 5 mm Optiview trocar was used to enter through the umbilicus.  Insufflation was accomplished, and another 5 mm trocar was placed in the left lower quadrant.    We found the following; this patient had endometriosis in the posterior cul-de-sac.  She had 2 John-Masters windows, one on either side, and on the right it was a multilayered window, very deep into the pelvis with brown endometriosis at the bottom of it.  We were able to find the ureter on the right and the ureter was well above this area, so I put him bipolar cautery and cauterized as well as I could deep in this John-Masters window.  She had a few other spots of white endometriosis across the posterior cul-de-sac that were cauterized and I cauterized in the John-Masters window on the left side.  Pictures were taken.    All fascial incisions over 8 mm were closed with Vicryl, and the rest of her incisions were closed, after evacuation of CO2, with subcuticular Vicryl and Steri-Strips.  All instruments and the catheter were removed and the patient was returned to the post anesthetic recovery room in satisfactory condition with clear urine.    Significant points about this procedure:Janie Hollis August 16, 1990

## 2019-07-02 NOTE — NURSING NOTE
Spoke with Dr Mora regarding Rx.  Patient reports allergy to Oxycodone.  States for patient to take Ibuprofen at recommended dosage if unable to tolerate Oxycodone.  Patient advised.

## 2019-07-02 NOTE — SIGNIFICANT NOTE
Prescription for discharge is oxycodone.  This is listed as an allergy on her chart.  Dr. Mora is currently in an OR suite.  Request relayed to him by Lulu Pena RN.  Made aware that the   Patient is allergic to oxycodone (it was prescribed to her with her last surgery and that is when she had the   Allergic reaction).  She is able to take hydrocodone.  Dr. Mora relays that he will take care of it once he finishes this case.

## 2019-07-02 NOTE — ANESTHESIA PREPROCEDURE EVALUATION
Anesthesia Evaluation     Patient summary reviewed and Nursing notes reviewed   no history of anesthetic complications:  NPO Solid Status: > 8 hours  NPO Liquid Status: > 8 hours           Airway   Mallampati: II  TM distance: >3 FB  Neck ROM: full  no difficulty expected  Dental    (+) poor dentition        Pulmonary - negative pulmonary ROS   (+) decreased breath sounds,   Cardiovascular - negative cardio ROS    Rhythm: regular  Rate: normal        Neuro/Psych  (+) numbness,     GI/Hepatic/Renal/Endo - negative ROS     Musculoskeletal     (+) chronic pain,   Abdominal    Substance History - negative use     OB/GYN negative ob/gyn ROS         Other - negative ROS                         Anesthesia Plan    ASA 2     general     intravenous induction   Anesthetic plan, all risks, benefits, and alternatives have been provided, discussed and informed consent has been obtained with: patient.  Use of blood products discussed with patient .   Plan discussed with CRNA.

## 2019-07-02 NOTE — ANESTHESIA POSTPROCEDURE EVALUATION
Patient: Janie Hollis    Procedure Summary     Date:  07/02/19 Room / Location:  [facility] OR 04 /  [facility]    Anesthesia Start:  0943 Anesthesia Stop:  1027    Procedure:  DIAGNOSTIC LAPAROSCOPY. FULGURATION OF ENDOMETRIOSIS, (N/A Abdomen) Diagnosis:  (R10.2)    Surgeon:  Onel Mora MD Provider:  Berry Quispe MD    Anesthesia Type:  general ASA Status:  2          Anesthesia Type: general  Last vitals  BP   90/47 (07/02/19 1030)   Temp   97 °F (36.1 °C) (07/02/19 1025)   Pulse   82 (07/02/19 1030)   Resp   16 (07/02/19 1030)     SpO2   100 % (07/02/19 1030)     Post Anesthesia Care and Evaluation    Patient location during evaluation: PHASE II  Patient participation: complete - patient participated  Level of consciousness: awake and alert  Pain score: 1  Pain management: adequate  Airway patency: patent  Anesthetic complications: No anesthetic complications  PONV Status: controlled  Cardiovascular status: acceptable  Respiratory status: acceptable  Hydration status: acceptable

## 2019-09-26 PROBLEM — Z01.419 WELL WOMAN EXAM: Status: ACTIVE | Noted: 2019-09-26

## 2019-09-27 ENCOUNTER — OFFICE VISIT (OUTPATIENT)
Dept: OBSTETRICS AND GYNECOLOGY | Facility: CLINIC | Age: 29
End: 2019-09-27

## 2019-09-27 VITALS
HEIGHT: 60 IN | SYSTOLIC BLOOD PRESSURE: 98 MMHG | BODY MASS INDEX: 18.81 KG/M2 | WEIGHT: 95.8 LBS | DIASTOLIC BLOOD PRESSURE: 70 MMHG

## 2019-09-27 DIAGNOSIS — Z87.42 HX OF ENDOMETRIOSIS: ICD-10-CM

## 2019-09-27 DIAGNOSIS — R10.32 LLQ ABDOMINAL PAIN: Primary | ICD-10-CM

## 2019-09-27 PROCEDURE — 99214 OFFICE O/P EST MOD 30 MIN: CPT | Performed by: OBSTETRICS & GYNECOLOGY

## 2019-09-27 NOTE — PROGRESS NOTES
"Subjective   Chief Complaint   Patient presents with   • Follow-up     pt states that since she had surgery on  she has been having bladder leakage and in the last week has started to have really bad pains. pt states that her stomach has remained swollen since the surgery as well.     Janie Hollis is a 29 y.o. year old .  Patient's last menstrual period was 2019.  She presents to be seen because of ongoing issues with pain.  She is received for gynecologic care mostly in [place] with Dr. Mora.  She seen Dr. Hastings once for consultation regarding fertility.  Dr. Mora is operated on her numerous times for pain.  Pictures not available at the operative notes describe what sounds like significant endometriosis.  Lupron has been mentioned but has never been used.  Depo-Provera has been used ~ .  Besides that there is been no other attempts at hormonal manipulation.      She does have a long-standing history of diarrhea since the 2016 surgery.  EGD has been performed colonoscopy has not.  She has issues with bloating.  Pain is predominantly in the left lower quadrant.    The following portions of the patient's history were reviewed and updated as appropriate:current medications, allergies, past family history, past medical history, past social history and past surgical history    Social History    Tobacco Use      Smoking status: Never Smoker      Smokeless tobacco: Never Used         Objective   BP 98/70   Ht 152.4 cm (60\")   Wt 43.5 kg (95 lb 12.8 oz)   LMP 2019 Comment: pt states that she had a period last week and then started again 2 days ago.  BMI 18.71 kg/m²     Lab Review   No data reviewed    Imaging   No data reviewed        Assessment   1. Pelvic pain with reported history of endometriosis.  Pain is predominantly left-sided.  Bloating is a problem.  Diarrhea is a problem.  I am suspicious her pelvic pain may have nothing to do with endometriosis at all and may be more " related to irritable bowel     Plan   1. Empiric trial of both Orlissa and Xifaxan  2. Empirically would also encourage her to eliminate gluten and dairy and see if that makes a difference.  If that does not would do some diet simplification starting with a brat diet with then sequentially add back  3. The importance of keeping all planned follow-up and taking all medications as prescribed was emphasized.  4. Follow up for recheck of symptoms 2-3 months    New Medications Ordered This Visit   Medications   • rifaximin (XIFAXAN) 550 MG tablet     Sig: Take 1 tablet by mouth Every 8 (Eight) Hours for 14 days. Use [facility] specialty pharmacy in [place] [phone]     Dispense:  42 tablet     Refill:  0   • Elagolix Sodium (ORILISSA) 150 MG tablet     Sig: Take 1 tablet by mouth Every Morning Before Breakfast.     Dispense:  30 tablet     Refill:  5          This note was electronically signed.    Yassine Prince M.D.  September 27, 2019    Total time spent today with Janie  was 35 minutes (level 3).  Off this time, 90% was spent face-to-face time coordinating care, answering her questions and counseling regarding pathophysiology of her presenting problem along with plans for any diagnositc work-up and treatment.    Note: Speech recognition transcription software may have been used to create portions of this document.  An attempt at proofreading has been made but errors in transcription could still be present.

## 2019-09-30 ENCOUNTER — PRIOR AUTHORIZATION (OUTPATIENT)
Dept: OBSTETRICS AND GYNECOLOGY | Facility: CLINIC | Age: 29
End: 2019-09-30

## 2019-10-01 RX ORDER — DIPHENOXYLATE HYDROCHLORIDE AND ATROPINE SULFATE 2.5; .025 MG/1; MG/1
1 TABLET ORAL 4 TIMES DAILY PRN
Qty: 30 TABLET | Refills: 1 | Status: SHIPPED | OUTPATIENT
Start: 2019-10-01 | End: 2019-11-09 | Stop reason: SDUPTHER

## 2019-11-11 RX ORDER — DIPHENOXYLATE HYDROCHLORIDE AND ATROPINE SULFATE 2.5; .025 MG/1; MG/1
TABLET ORAL
Qty: 30 TABLET | Refills: 1 | Status: SHIPPED | OUTPATIENT
Start: 2019-11-11 | End: 2019-12-14

## 2019-12-14 ENCOUNTER — APPOINTMENT (OUTPATIENT)
Dept: ULTRASOUND IMAGING | Facility: HOSPITAL | Age: 29
End: 2019-12-14

## 2019-12-14 ENCOUNTER — HOSPITAL ENCOUNTER (EMERGENCY)
Facility: HOSPITAL | Age: 29
Discharge: HOME | End: 2019-12-14
Attending: FAMILY MEDICINE | Admitting: FAMILY MEDICINE

## 2019-12-14 VITALS
RESPIRATION RATE: 18 BRPM | BODY MASS INDEX: 18.26 KG/M2 | TEMPERATURE: 98 F | SYSTOLIC BLOOD PRESSURE: 106 MMHG | DIASTOLIC BLOOD PRESSURE: 57 MMHG | HEIGHT: 60 IN | HEART RATE: 79 BPM | OXYGEN SATURATION: 99 % | WEIGHT: 93 LBS

## 2019-12-14 DIAGNOSIS — I99.8 FLUCTUATING BLOOD PRESSURE: Primary | ICD-10-CM

## 2019-12-14 DIAGNOSIS — Z3A.01 LESS THAN 8 WEEKS GESTATION OF PREGNANCY: ICD-10-CM

## 2019-12-14 LAB
ALBUMIN SERPL-MCNC: 4.16 G/DL (ref 3.5–5.2)
ALBUMIN/GLOB SERPL: 1.6 G/DL
ALP SERPL-CCNC: 54 U/L (ref 39–117)
ALT SERPL W P-5'-P-CCNC: 15 U/L (ref 1–33)
ANION GAP SERPL CALCULATED.3IONS-SCNC: 12.9 MMOL/L (ref 5–15)
AST SERPL-CCNC: 14 U/L (ref 1–32)
BASOPHILS # BLD AUTO: 0.06 10*3/MM3 (ref 0–0.2)
BASOPHILS NFR BLD AUTO: 0.8 % (ref 0–1.5)
BILIRUB SERPL-MCNC: 0.7 MG/DL (ref 0.2–1.2)
BILIRUB UR QL STRIP: NEGATIVE
BUN BLD-MCNC: 5 MG/DL (ref 6–20)
BUN/CREAT SERPL: 7.9 (ref 7–25)
CALCIUM SPEC-SCNC: 9.1 MG/DL (ref 8.6–10.5)
CHLORIDE SERPL-SCNC: 101 MMOL/L (ref 98–107)
CLARITY UR: CLEAR
CO2 SERPL-SCNC: 24.1 MMOL/L (ref 22–29)
COLOR UR: YELLOW
CREAT BLD-MCNC: 0.63 MG/DL (ref 0.57–1)
DEPRECATED RDW RBC AUTO: 40 FL (ref 37–54)
EOSINOPHIL # BLD AUTO: 0.06 10*3/MM3 (ref 0–0.4)
EOSINOPHIL NFR BLD AUTO: 0.8 % (ref 0.3–6.2)
ERYTHROCYTE [DISTWIDTH] IN BLOOD BY AUTOMATED COUNT: 13.4 % (ref 12.3–15.4)
GFR SERPL CREATININE-BSD FRML MDRD: 113 ML/MIN/1.73
GLOBULIN UR ELPH-MCNC: 2.5 GM/DL
GLUCOSE BLD-MCNC: 70 MG/DL (ref 65–99)
GLUCOSE UR STRIP-MCNC: NEGATIVE MG/DL
HCG INTACT+B SERPL-ACNC: NORMAL MIU/ML
HCT VFR BLD AUTO: 37.3 % (ref 34–46.6)
HGB BLD-MCNC: 12.7 G/DL (ref 12–15.9)
HGB UR QL STRIP.AUTO: NEGATIVE
IMM GRANULOCYTES # BLD AUTO: 0.03 10*3/MM3 (ref 0–0.05)
IMM GRANULOCYTES NFR BLD AUTO: 0.4 % (ref 0–0.5)
KETONES UR QL STRIP: NEGATIVE
LEUKOCYTE ESTERASE UR QL STRIP.AUTO: NEGATIVE
LYMPHOCYTES # BLD AUTO: 2.01 10*3/MM3 (ref 0.7–3.1)
LYMPHOCYTES NFR BLD AUTO: 28 % (ref 19.6–45.3)
MCH RBC QN AUTO: 27.9 PG (ref 26.6–33)
MCHC RBC AUTO-ENTMCNC: 34 G/DL (ref 31.5–35.7)
MCV RBC AUTO: 81.8 FL (ref 79–97)
MONOCYTES # BLD AUTO: 0.55 10*3/MM3 (ref 0.1–0.9)
MONOCYTES NFR BLD AUTO: 7.7 % (ref 5–12)
NEUTROPHILS # BLD AUTO: 4.46 10*3/MM3 (ref 1.7–7)
NEUTROPHILS NFR BLD AUTO: 62.3 % (ref 42.7–76)
NITRITE UR QL STRIP: NEGATIVE
NRBC BLD AUTO-RTO: 0 /100 WBC (ref 0–0.2)
PH UR STRIP.AUTO: 6.5 [PH] (ref 5–8)
PLATELET # BLD AUTO: 241 10*3/MM3 (ref 140–450)
PMV BLD AUTO: 10.7 FL (ref 6–12)
POTASSIUM BLD-SCNC: 3.3 MMOL/L (ref 3.5–5.2)
PROT SERPL-MCNC: 6.7 G/DL (ref 6–8.5)
PROT UR QL STRIP: NEGATIVE
RBC # BLD AUTO: 4.56 10*6/MM3 (ref 3.77–5.28)
SODIUM BLD-SCNC: 138 MMOL/L (ref 136–145)
SP GR UR STRIP: 1.02 (ref 1–1.03)
UROBILINOGEN UR QL STRIP: NORMAL
WBC NRBC COR # BLD: 7.17 10*3/MM3 (ref 3.4–10.8)

## 2019-12-14 PROCEDURE — 76815 OB US LIMITED FETUS(S): CPT | Performed by: RADIOLOGY

## 2019-12-14 PROCEDURE — 80053 COMPREHEN METABOLIC PANEL: CPT | Performed by: NURSE PRACTITIONER

## 2019-12-14 PROCEDURE — 96360 HYDRATION IV INFUSION INIT: CPT

## 2019-12-14 PROCEDURE — 99283 EMERGENCY DEPT VISIT LOW MDM: CPT

## 2019-12-14 PROCEDURE — 76801 OB US < 14 WKS SINGLE FETUS: CPT

## 2019-12-14 PROCEDURE — 81003 URINALYSIS AUTO W/O SCOPE: CPT | Performed by: NURSE PRACTITIONER

## 2019-12-14 PROCEDURE — 85025 COMPLETE CBC W/AUTO DIFF WBC: CPT | Performed by: NURSE PRACTITIONER

## 2019-12-14 PROCEDURE — 84702 CHORIONIC GONADOTROPIN TEST: CPT | Performed by: NURSE PRACTITIONER

## 2019-12-14 RX ORDER — LANOLIN ALCOHOL/MO/W.PET/CERES
50 CREAM (GRAM) TOPICAL DAILY
COMMUNITY
End: 2020-04-15

## 2019-12-14 RX ORDER — PRENATAL VIT/IRON FUM/FOLIC AC 27MG-0.8MG
TABLET ORAL DAILY
COMMUNITY
End: 2020-10-06

## 2019-12-14 RX ORDER — SODIUM CHLORIDE 0.9 % (FLUSH) 0.9 %
10 SYRINGE (ML) INJECTION AS NEEDED
Status: DISCONTINUED | OUTPATIENT
Start: 2019-12-14 | End: 2019-12-14 | Stop reason: HOSPADM

## 2019-12-14 RX ADMIN — SODIUM CHLORIDE 1000 ML: 9 INJECTION, SOLUTION INTRAVENOUS at 12:00

## 2019-12-14 NOTE — ED PROVIDER NOTES
Subjective     History provided by:  Patient   used: No    Weakness - Generalized   Severity:  Moderate (Patient reports she is pregnant with twins and states her blood presure has been going up and down last couple days)  Duration:  2 days  Timing:  Intermittent  Progression:  Waxing and waning  Chronicity:  New  Context: not alcohol use, not allergies, not change in medication, not drug use, not increased activity and not recent infection    Relieved by:  Nothing  Worsened by:  Nothing  Ineffective treatments:  None tried  Associated symptoms: nausea    Associated symptoms: no abdominal pain, no aphasia, no arthralgias, no cough, no numbness in extremities, no headaches, no hematochezia, no seizures, no shortness of breath, no stroke symptoms and no urgency    Risk factors: no anemia, no coronary artery disease, no heart disease and no neurologic disease        Review of Systems   Constitutional: Negative.    HENT: Negative.    Eyes: Negative.    Respiratory: Negative.  Negative for cough and shortness of breath.    Cardiovascular: Negative.    Gastrointestinal: Positive for nausea. Negative for abdominal pain and hematochezia.   Endocrine: Negative.    Genitourinary: Negative.  Negative for urgency.   Musculoskeletal: Negative.  Negative for arthralgias.   Skin: Negative.    Allergic/Immunologic: Negative.    Neurological: Negative for seizures and headaches.   Hematological: Negative.    Psychiatric/Behavioral: Negative.        Past Medical History:   Diagnosis Date   • Anxiety    • Endometriosis 2016    Status post multiple laparoscopies   • Oral thrush 2012    hospitalized x 2 weeks       Allergies   Allergen Reactions   • Oxycodone Hcl Swelling   • Codeine Other (See Comments)     Increased Heart Rate       Past Surgical History:   Procedure Laterality Date   •  SECTION PRIMARY  2013   • D&C AND LAPAROSCOPY N/A 2018    [facility], [place], operated by [facility]; Dr. Mora; endometrial  pathology benign; laparoscopic findings consistent with endometriosis   • [facility] Dr. Mor[facility]; findings consistent with endometriosis   • DIAGNOSTIC LAPAROSCOPY N/A 12/13/2016    [facility] [place]; Dr. Mora; diagnostic laparoscopy with fulguration of endometriosis   • DIAGNOSTIC LAPAROSCOPY N/A 7/2/2019    [facility], [place], [place] by [facility] [place]; Dr. Mora; findings consistent with endometriosis   • ENDOSCOPY  2016    Normal   • GANGLION CYST EXCISION Right 2014   • SKIN BIOPSY  2017    on abdomen - all benign       Family History   Problem Relation Age of Onset   • Diabetes Sister    • Heart disease Maternal Grandmother    • Hypertension Maternal Grandmother        Social History     Socioeconomic History   • Marital status:      Spouse name: Not on file   • Number of children: Not on file   • Years of education: Not on file   • Highest education level: Not on file   Tobacco Use   • Smoking status: Never Smoker   • Smokeless tobacco: Never Used   Substance and Sexual Activity   • Alcohol use: No   • Drug use: No   • Sexual activity: Yes     Partners: Male     Birth control/protection: None           Objective   Physical Exam   Constitutional: She is oriented to person, place, and time. She appears well-developed and well-nourished.   HENT:   Head: Normocephalic.   Right Ear: External ear normal.   Left Ear: External ear normal.   Mouth/Throat: Oropharynx is clear and moist.   Eyes: Pupils are equal, round, and reactive to light. Conjunctivae and EOM are normal.   Neck: Normal range of motion. Neck supple.   Cardiovascular: Normal rate, regular rhythm, normal heart sounds and intact distal pulses.   Pulmonary/Chest: Effort normal and breath sounds normal.   Abdominal: Soft. Bowel sounds are normal.   Musculoskeletal: Normal range of motion.   Neurological: She is alert and oriented to person, place, and time.   Skin: Skin is warm and dry. Capillary refill takes less than 2  seconds.   Psychiatric: She has a normal mood and affect. Her behavior is normal. Thought content normal.   Nursing note and vitals reviewed.      Procedures           ED Course                      No data recorded                        MDM    Final diagnoses:   Fluctuating blood pressure   Less than 8 weeks gestation of pregnancy              Wilfredo Mendoza, APRN  12/21/19 [id number]

## 2019-12-18 ENCOUNTER — INITIAL PRENATAL (OUTPATIENT)
Dept: OBSTETRICS AND GYNECOLOGY | Facility: CLINIC | Age: 29
End: 2019-12-18

## 2019-12-18 ENCOUNTER — APPOINTMENT (OUTPATIENT)
Dept: LAB | Facility: HOSPITAL | Age: 29
End: 2019-12-18

## 2019-12-18 VITALS — BODY MASS INDEX: 17.97 KG/M2 | SYSTOLIC BLOOD PRESSURE: 104 MMHG | WEIGHT: 92 LBS | DIASTOLIC BLOOD PRESSURE: 62 MMHG

## 2019-12-18 DIAGNOSIS — O30.049 TWIN PREGNANCY, DICHORIONIC/DIAMNIOTIC, UNSPECIFIED TRIMESTER: Primary | ICD-10-CM

## 2019-12-18 DIAGNOSIS — O34.219 PREVIOUS CESAREAN DELIVERY AFFECTING PREGNANCY: ICD-10-CM

## 2019-12-18 PROBLEM — N94.5 SECONDARY DYSMENORRHEA: Status: RESOLVED | Noted: 2018-07-25 | Resolved: 2019-12-18

## 2019-12-18 LAB
ABO GROUP BLD: NORMAL
AMPHET+METHAMPHET UR QL: NEGATIVE
AMPHETAMINES UR QL: NEGATIVE
BARBITURATES UR QL SCN: NEGATIVE
BASOPHILS # BLD AUTO: 0.08 10*3/MM3 (ref 0–0.2)
BASOPHILS NFR BLD AUTO: 0.7 % (ref 0–1.5)
BENZODIAZ UR QL SCN: NEGATIVE
BLD GP AB SCN SERPL QL: NEGATIVE
BUPRENORPHINE SERPL-MCNC: NEGATIVE NG/ML
CANNABINOIDS SERPL QL: NEGATIVE
COCAINE UR QL: NEGATIVE
DEPRECATED RDW RBC AUTO: 39.7 FL (ref 37–54)
EOSINOPHIL # BLD AUTO: 0.05 10*3/MM3 (ref 0–0.4)
EOSINOPHIL NFR BLD AUTO: 0.5 % (ref 0.3–6.2)
ERYTHROCYTE [DISTWIDTH] IN BLOOD BY AUTOMATED COUNT: 13.6 % (ref 12.3–15.4)
HBV SURFACE AG SERPL QL IA: NORMAL
HCT VFR BLD AUTO: 38 % (ref 34–46.6)
HCV AB SER DONR QL: NORMAL
HGB BLD-MCNC: 13 G/DL (ref 12–15.9)
HIV1+2 AB SER QL: NORMAL
IMM GRANULOCYTES # BLD AUTO: 0.03 10*3/MM3 (ref 0–0.05)
IMM GRANULOCYTES NFR BLD AUTO: 0.3 % (ref 0–0.5)
LYMPHOCYTES # BLD AUTO: 1.95 10*3/MM3 (ref 0.7–3.1)
LYMPHOCYTES NFR BLD AUTO: 17.8 % (ref 19.6–45.3)
MCH RBC QN AUTO: 28.3 PG (ref 26.6–33)
MCHC RBC AUTO-ENTMCNC: 34.2 G/DL (ref 31.5–35.7)
MCV RBC AUTO: 82.6 FL (ref 79–97)
METHADONE UR QL SCN: NEGATIVE
MONOCYTES # BLD AUTO: 0.6 10*3/MM3 (ref 0.1–0.9)
MONOCYTES NFR BLD AUTO: 5.5 % (ref 5–12)
NEUTROPHILS # BLD AUTO: 8.22 10*3/MM3 (ref 1.7–7)
NEUTROPHILS NFR BLD AUTO: 75.2 % (ref 42.7–76)
NRBC BLD AUTO-RTO: 0.1 /100 WBC (ref 0–0.2)
OPIATES UR QL: NEGATIVE
OXYCODONE UR QL SCN: NEGATIVE
PCP UR QL SCN: NEGATIVE
PLATELET # BLD AUTO: 296 10*3/MM3 (ref 140–450)
PMV BLD AUTO: 11.4 FL (ref 6–12)
PROPOXYPH UR QL: NEGATIVE
RBC # BLD AUTO: 4.6 10*6/MM3 (ref 3.77–5.28)
RH BLD: POSITIVE
TRICYCLICS UR QL SCN: NEGATIVE
WBC NRBC COR # BLD: 10.93 10*3/MM3 (ref 3.4–10.8)

## 2019-12-18 PROCEDURE — 0501F PRENATAL FLOW SHEET: CPT | Performed by: OBSTETRICS & GYNECOLOGY

## 2019-12-18 PROCEDURE — 87491 CHLMYD TRACH DNA AMP PROBE: CPT | Performed by: OBSTETRICS & GYNECOLOGY

## 2019-12-18 PROCEDURE — 36415 COLL VENOUS BLD VENIPUNCTURE: CPT | Performed by: OBSTETRICS & GYNECOLOGY

## 2019-12-18 PROCEDURE — 87591 N.GONORRHOEAE DNA AMP PROB: CPT | Performed by: OBSTETRICS & GYNECOLOGY

## 2019-12-18 PROCEDURE — 86803 HEPATITIS C AB TEST: CPT | Performed by: OBSTETRICS & GYNECOLOGY

## 2019-12-18 PROCEDURE — 87086 URINE CULTURE/COLONY COUNT: CPT | Performed by: OBSTETRICS & GYNECOLOGY

## 2019-12-18 PROCEDURE — 80081 OBSTETRIC PANEL INC HIV TSTG: CPT | Performed by: OBSTETRICS & GYNECOLOGY

## 2019-12-18 PROCEDURE — 80307 DRUG TEST PRSMV CHEM ANLYZR: CPT | Performed by: OBSTETRICS & GYNECOLOGY

## 2019-12-18 RX ORDER — FERROUS SULFATE 325(65) MG
325 TABLET ORAL
Qty: 60 TABLET | Refills: 4 | Status: SHIPPED | OUTPATIENT
Start: 2019-12-18 | End: 2020-05-16

## 2019-12-18 NOTE — PROGRESS NOTES
Subjective   Chief Complaint   Patient presents with   • Routine Prenatal Visit       Janie Hollis is a 29 y.o. year old .  Patient's last menstrual period was 2019.  She presents to be seen to initiate prenatal care.     Social History    Tobacco Use      Smoking status: Never Smoker      Smokeless tobacco: Never Used      The following portions of the patient's history were reviewed and updated as appropriate:vital signs, allergies, current medications, past medical history, past social history, past surgical history and problem list.    Objective   Lab Review   No data reviewed    Imaging   Pelvic ultrasound report    Assessment/Plan   ASSESSMENT  1. 29 y.o. year old  at 6w5d  2. Supervision of high risk pregnancy  3. Twin pregnancy - dichorionic, diamniotic    PLAN  1. The problem list for pregnancy was initiated today  2. Tests ordered today:  Orders Placed This Encounter   Procedures   • Urine Culture - Urine, Urine, Clean Catch   • Chlamydia trachomatis, Neisseria gonorrhoeae, PCR w/ confirmation - Urine, Urine, Clean Catch   • Obstetric Panel   • HIV-1 / O / 2 Ag / Antibody 4th Generation   • Urine Drug Screen - Urine, Clean Catch     Please confirm all positive UDS     3. Testing for GC / Chlamydia / trichomonas was done today  4. Genetic testing reviewed: NIPT (Panorama)  5. Explained need for cervical length measurements at 16, 20 and 24 weeks.  Also explained need for monthly ultrasounds for fetal weight.  6. Delivery by  birth if bleeding twin is not vertex explained  7. Need to get her prior operative note regarding  incision  8. Information reviewed: exercise in pregnancy, nutrition in pregnancy, weight gain in pregnancy, work and travel restrictions during pregnancy, list of OTC medications acceptable in pregnancy and call coverage groups    Total time spent today with Janie  was 30 minutes (level 4).  Off this time, 80% was spent face-to-face time  coordinating care, answering her questions and counseling regarding pathophysiology of her presenting problem along with plans for any diagnositc work-up and treatment.    Follow up: 5 week(s)       This note was electronically signed.    Yassine Prince MD  December 18, 2019

## 2019-12-19 LAB
BACTERIA SPEC AEROBE CULT: NO GROWTH
RPR SER QL: NORMAL
RUBV IGG SERPL IA-ACNC: POSITIVE

## 2019-12-20 LAB
C TRACH RRNA SPEC DONR QL NAA+PROBE: NEGATIVE
N GONORRHOEA DNA SPEC QL NAA+PROBE: NEGATIVE

## 2020-01-09 ENCOUNTER — APPOINTMENT (OUTPATIENT)
Dept: ULTRASOUND IMAGING | Facility: HOSPITAL | Age: 30
End: 2020-01-09

## 2020-01-09 ENCOUNTER — HOSPITAL ENCOUNTER (EMERGENCY)
Facility: HOSPITAL | Age: 30
Discharge: HOME | End: 2020-01-09
Attending: EMERGENCY MEDICINE | Admitting: EMERGENCY MEDICINE

## 2020-01-09 ENCOUNTER — TELEPHONE (OUTPATIENT)
Dept: OBSTETRICS AND GYNECOLOGY | Facility: CLINIC | Age: 30
End: 2020-01-09

## 2020-01-09 VITALS
RESPIRATION RATE: 18 BRPM | WEIGHT: 90 LBS | DIASTOLIC BLOOD PRESSURE: 68 MMHG | BODY MASS INDEX: 17.67 KG/M2 | HEART RATE: 97 BPM | OXYGEN SATURATION: 100 % | SYSTOLIC BLOOD PRESSURE: 106 MMHG | HEIGHT: 60 IN | TEMPERATURE: 98 F

## 2020-01-09 DIAGNOSIS — Z3A.10 10 WEEKS GESTATION OF PREGNANCY: Primary | ICD-10-CM

## 2020-01-09 DIAGNOSIS — O30.001 TWIN GESTATION IN FIRST TRIMESTER, UNSPECIFIED MULTIPLE GESTATION TYPE: ICD-10-CM

## 2020-01-09 LAB
BASOPHILS # BLD AUTO: 0.06 10*3/MM3 (ref 0–0.2)
BASOPHILS NFR BLD AUTO: 0.6 % (ref 0–1.5)
BILIRUB UR QL STRIP: NEGATIVE
CLARITY UR: CLEAR
COLOR UR: YELLOW
DEPRECATED RDW RBC AUTO: 40.2 FL (ref 37–54)
EOSINOPHIL # BLD AUTO: 0.03 10*3/MM3 (ref 0–0.4)
EOSINOPHIL NFR BLD AUTO: 0.3 % (ref 0.3–6.2)
ERYTHROCYTE [DISTWIDTH] IN BLOOD BY AUTOMATED COUNT: 13.4 % (ref 12.3–15.4)
GLUCOSE UR STRIP-MCNC: NEGATIVE MG/DL
HCG INTACT+B SERPL-ACNC: NORMAL MIU/ML
HCT VFR BLD AUTO: 37.4 % (ref 34–46.6)
HGB BLD-MCNC: 12.5 G/DL (ref 12–15.9)
HGB UR QL STRIP.AUTO: NEGATIVE
HOLD SPECIMEN: NORMAL
HOLD SPECIMEN: NORMAL
IMM GRANULOCYTES # BLD AUTO: 0.04 10*3/MM3 (ref 0–0.05)
IMM GRANULOCYTES NFR BLD AUTO: 0.4 % (ref 0–0.5)
KETONES UR QL STRIP: NEGATIVE
LEUKOCYTE ESTERASE UR QL STRIP.AUTO: NEGATIVE
LIPASE SERPL-CCNC: 53 U/L (ref 13–60)
LYMPHOCYTES # BLD AUTO: 1.99 10*3/MM3 (ref 0.7–3.1)
LYMPHOCYTES NFR BLD AUTO: 19.8 % (ref 19.6–45.3)
MCH RBC QN AUTO: 27.5 PG (ref 26.6–33)
MCHC RBC AUTO-ENTMCNC: 33.4 G/DL (ref 31.5–35.7)
MCV RBC AUTO: 82.4 FL (ref 79–97)
MONOCYTES # BLD AUTO: 0.57 10*3/MM3 (ref 0.1–0.9)
MONOCYTES NFR BLD AUTO: 5.7 % (ref 5–12)
NEUTROPHILS # BLD AUTO: 7.37 10*3/MM3 (ref 1.7–7)
NEUTROPHILS NFR BLD AUTO: 73.2 % (ref 42.7–76)
NITRITE UR QL STRIP: NEGATIVE
NRBC BLD AUTO-RTO: 0 /100 WBC (ref 0–0.2)
PH UR STRIP.AUTO: 7 [PH] (ref 5–8)
PLATELET # BLD AUTO: 261 10*3/MM3 (ref 140–450)
PMV BLD AUTO: 10.6 FL (ref 6–12)
PROT UR QL STRIP: NEGATIVE
RBC # BLD AUTO: 4.54 10*6/MM3 (ref 3.77–5.28)
SP GR UR STRIP: <=1.005 (ref 1–1.03)
UROBILINOGEN UR QL STRIP: NORMAL
WBC NRBC COR # BLD: 10.06 10*3/MM3 (ref 3.4–10.8)
WHOLE BLOOD HOLD SPECIMEN: NORMAL
WHOLE BLOOD HOLD SPECIMEN: NORMAL

## 2020-01-09 PROCEDURE — 85025 COMPLETE CBC W/AUTO DIFF WBC: CPT | Performed by: EMERGENCY MEDICINE

## 2020-01-09 PROCEDURE — 83690 ASSAY OF LIPASE: CPT | Performed by: EMERGENCY MEDICINE

## 2020-01-09 PROCEDURE — 81003 URINALYSIS AUTO W/O SCOPE: CPT | Performed by: EMERGENCY MEDICINE

## 2020-01-09 PROCEDURE — 84702 CHORIONIC GONADOTROPIN TEST: CPT | Performed by: EMERGENCY MEDICINE

## 2020-01-09 PROCEDURE — 99284 EMERGENCY DEPT VISIT MOD MDM: CPT

## 2020-01-09 PROCEDURE — 76817 TRANSVAGINAL US OBSTETRIC: CPT

## 2020-01-09 RX ORDER — SODIUM CHLORIDE 0.9 % (FLUSH) 0.9 %
10 SYRINGE (ML) INJECTION AS NEEDED
Status: DISCONTINUED | OUTPATIENT
Start: 2020-01-09 | End: 2020-01-09 | Stop reason: HOSPADM

## 2020-01-09 NOTE — ED PROVIDER NOTES
Subjective   Janie Hollis is a 29 y.o. female who presents to the ED with c/o pregnancy problem. The patient is currently 10 weeks pregnant with twins and she is A0. She reports that she has been constipated for the past 6 days but she had a bowel movement yesterday evening. Following the bowel movement the patient began having severe lower abdominal pain that radiates to her back. Due to the pain persisting through today she contacted her OB's, Dr. Prince, office to inform them of her symptoms. Dr. Prince' office advised her to present to the ED for evaluation. She complains of nausea, sharp pain in her groin, unexpected weight loss of 11 pounds in 10 weeks, and near-syncopal episodes but denies vaginal bleeding. The patient reports this pregnancy has been more difficult than her previous one, as she has had issues with her blood pressure being too low. Labs performed on 2019 revealed that her blood type is A+. She has a past medical history of endometriosis, anxiety, and oral thrush. Her surgical history includes low transverse  section, ganglion cyst excision, and dilation and curettage. There are no other acute complaints at this time.      History provided by:  Patient and parent  Pregnancy Problem   The current episode started yesterday. The problem has been gradually worsening. Episode is severe. Gestational age is 10 weeks.   Primary symptoms include abdominal pain. Patient reports no contractions, no decreased fetal movement, no vaginal bleeding and no vaginal discharge. Prenatal care has been regular.   Associated symptoms include nausea and vomiting.   Associated symptoms include no fever, no headaches, no light-headedness, no malaise, no shortness of breath, no syncope and no weakness. Prior pregnancy complications do not include miscarriage, history of placental abruption, prior premature delivery or prior stillbirth.   Risk factors include multiple pregnancy.   Risk factors do  not include advanced maternal age, diabetes, gestational diabetes, hypertension, obesity or pre-eclampsia.       Review of Systems   Constitutional: Positive for unexpected weight change (lost 11 pounds in 10 weeks). Negative for fever.   Respiratory: Negative for shortness of breath.    Cardiovascular: Negative for syncope.   Gastrointestinal: Positive for abdominal pain, nausea and vomiting.   Genitourinary: Negative for vaginal bleeding and vaginal discharge.        The patient complains of pain in her groin.  She is currently 10 weeks pregnant and A0.   Musculoskeletal: Positive for back pain.   Neurological: Negative for weakness, light-headedness and headaches.        The patient complains of near-syncope.   All other systems reviewed and are negative.      Past Medical History:   Diagnosis Date   • Anxiety    • Endometriosis 2016    Status post multiple laparoscopies   • Oral thrush 2012    hospitalized x 2 weeks       Allergies   Allergen Reactions   • Oxycodone Hcl Swelling   • Codeine Other (See Comments)     Increased Heart Rate       Past Surgical History:   Procedure Laterality Date   •  SECTION PRIMARY  2013   • [facility] Dr. Mor[facility]; endometrial pathology benign; laparoscopic findings consistent with endometriosis   • DIAGNOSTIC LAPAROSCOPY      [facility] [place]; Dr. Mora; findings consistent with endometriosis   • DIAGNOSTIC LAPAROSCOPY N/A 2016    [facility] [place]; Dr. Mora; diagnostic laparoscopy with fulguration of endometriosis   • DIAGNOSTIC [facility]COPY [facility]A [facility] Dr. [facility]; findings consistent with endometriosis   • ENDOSCOPY  2016    Normal   • GANGLION CYST EXCISION Right    • SKIN BIOPSY  2017    on abdomen - all benign       Family History   Problem Relation Age of Onset   • Diabetes Sister    • Heart disease Maternal Grandmother    • Hypertension Maternal Grandmother         Social History     Socioeconomic History   • Marital status:      Spouse name: Not on file   • Number of children: Not on file   • Years of education: Not on file   • Highest education level: Not on file   Tobacco Use   • Smoking status: Never Smoker   • Smokeless tobacco: Never Used   Substance and Sexual Activity   • Alcohol use: No   • Drug use: No   • Sexual activity: Yes     Partners: Male     Birth control/protection: None         Objective   Physical Exam   Constitutional: She is oriented to person, place, and time. She appears well-developed and well-nourished. No distress.   HENT:   Head: Normocephalic and atraumatic.   Eyes: Conjunctivae are normal. No scleral icterus.   Neck: Normal range of motion. Neck supple.   Cardiovascular: Normal rate, regular rhythm and normal heart sounds.   No murmur heard.  Pulmonary/Chest: Effort normal and breath sounds normal. No respiratory distress.   Abdominal: Soft. There is no tenderness.   Gravid abdomen midway between the pubic symphysis and the umbilicus.   Genitourinary: Vagina normal. Rectal exam shows guaiac negative stool. No vaginal discharge found.   Musculoskeletal: Normal range of motion. She exhibits no edema.   Neurological: She is alert and oriented to person, place, and time.   Skin: Skin is warm and dry.   Psychiatric: She has a normal mood and affect. Her behavior is normal.   Nursing note and vitals reviewed.      Procedures         ED Course  ED Course as of Jan 11 1352   Thu Jan 09, 2020   1741 Adiel TAMEZ is bedside updating the patient on the results of the studies and the plan for discharge.    [BS]      ED Course User Index  [BS] Augustin Gleason       No results found for this or any previous visit (from the past 24 hour(s)).  Note: In addition to lab results from this visit, the labs listed above may include labs taken at another facility or during a different encounter within the last 24 hours. Please correlate lab times  "with ED admission and discharge times for further clarification of the services performed during this visit.    US Ob Transvaginal   Final Result   Viable intrauterine twin pregnancies with separate   gestational sacs and fetal heart rates identified at 173 beats for   minute and 180 bpm respectively with crown-rump length corresponding to   10 weeks 6 days and 11 weeks 0 days respectively. Follow-up with   dedicated obstetric ultrasound recommended.       DICTATED:   01/09/2020   EDITED/ls :   01/09/2020             This report was finalized on 1/10/2020 8:02 AM by Dr. Earl Sifuentes.            Vitals:    01/09/20 1337 01/09/20 1600 01/09/20 1626 01/09/20 1650   BP: 135/55 100/55 96/60 106/68   Pulse: 97      Resp: 18      Temp: 98 °F (36.7 °C)      SpO2: 100%      Weight: 40.8 kg (90 lb)      Height: 152.4 cm (60\")        Medications - No data to display  ECG/EMG Results (last 24 hours)     ** No results found for the last 24 hours. **        No orders to display                     MDM  Number of Diagnoses or Management Options  10 weeks gestation of pregnancy: new and requires workup  Twin gestation in first trimester, unspecified multiple gestation type: new and requires workup     Amount and/or Complexity of Data Reviewed  Clinical lab tests: reviewed and ordered  Tests in the radiology section of CPT®: ordered and reviewed  Tests in the medicine section of CPT®: ordered and reviewed  Discuss the patient with other providers: yes    Patient Progress  Patient progress: stable      Final diagnoses:   10 weeks gestation of pregnancy   Twin gestation in first trimester, unspecified multiple gestation type       Documentation assistance provided by leroy Gleason.  Information recorded by the leroy was done at my direction and has been verified and validated by me.     Augustin Gleason  01/09/20 6133       Adiel Forbes PA  01/11/20 1352    "

## 2020-01-09 NOTE — TELEPHONE ENCOUNTER
Pt stated her pain started yesterday but got worst today, Pt said the hitting at the bottom of her stomach and going into her back, the pain is so strong its taking her breath away, I advised Pt to go to the Er

## 2020-01-16 ENCOUNTER — TELEPHONE (OUTPATIENT)
Dept: OBSTETRICS AND GYNECOLOGY | Facility: CLINIC | Age: 30
End: 2020-01-16

## 2020-01-16 NOTE — TELEPHONE ENCOUNTER
Janie called office c/o cough, chest congestion, fever and body aches.  Mother recently dx with Flu. Advised pt to be seen at her PCP or local urgent care center. Pt verbalizes understanding.

## 2020-01-18 ENCOUNTER — APPOINTMENT (OUTPATIENT)
Dept: GENERAL RADIOLOGY | Facility: HOSPITAL | Age: 30
End: 2020-01-18

## 2020-01-18 ENCOUNTER — HOSPITAL ENCOUNTER (EMERGENCY)
Facility: HOSPITAL | Age: 30
Discharge: HOME | End: 2020-01-18
Attending: EMERGENCY MEDICINE | Admitting: EMERGENCY MEDICINE

## 2020-01-18 VITALS
TEMPERATURE: 98.5 F | HEIGHT: 60 IN | BODY MASS INDEX: 18.06 KG/M2 | RESPIRATION RATE: 20 BRPM | WEIGHT: 92 LBS | OXYGEN SATURATION: 97 % | DIASTOLIC BLOOD PRESSURE: 66 MMHG | HEART RATE: 115 BPM | SYSTOLIC BLOOD PRESSURE: 112 MMHG

## 2020-01-18 DIAGNOSIS — J10.1 INFLUENZA A: Primary | ICD-10-CM

## 2020-01-18 LAB
ALBUMIN SERPL-MCNC: 3.66 G/DL (ref 3.5–5.2)
ALBUMIN/GLOB SERPL: 1.3 G/DL
ALP SERPL-CCNC: 72 U/L (ref 39–117)
ALT SERPL W P-5'-P-CCNC: 39 U/L (ref 1–33)
ANION GAP SERPL CALCULATED.3IONS-SCNC: 14.2 MMOL/L (ref 5–15)
AST SERPL-CCNC: 34 U/L (ref 1–32)
BASOPHILS # BLD AUTO: 0.02 10*3/MM3 (ref 0–0.2)
BASOPHILS NFR BLD AUTO: 0.4 % (ref 0–1.5)
BILIRUB SERPL-MCNC: 0.3 MG/DL (ref 0.2–1.2)
BUN BLD-MCNC: 3 MG/DL (ref 6–20)
BUN/CREAT SERPL: 7.3 (ref 7–25)
CALCIUM SPEC-SCNC: 8.8 MG/DL (ref 8.6–10.5)
CHLORIDE SERPL-SCNC: 101 MMOL/L (ref 98–107)
CO2 SERPL-SCNC: 20.8 MMOL/L (ref 22–29)
CREAT BLD-MCNC: 0.41 MG/DL (ref 0.57–1)
DEPRECATED RDW RBC AUTO: 39.8 FL (ref 37–54)
EOSINOPHIL # BLD AUTO: 0.03 10*3/MM3 (ref 0–0.4)
EOSINOPHIL NFR BLD AUTO: 0.6 % (ref 0.3–6.2)
ERYTHROCYTE [DISTWIDTH] IN BLOOD BY AUTOMATED COUNT: 13.5 % (ref 12.3–15.4)
FLUAV AG NPH QL: POSITIVE
FLUBV AG NPH QL IA: NEGATIVE
GFR SERPL CREATININE-BSD FRML MDRD: >150 ML/MIN/1.73
GLOBULIN UR ELPH-MCNC: 2.8 GM/DL
GLUCOSE BLD-MCNC: 109 MG/DL (ref 65–99)
HCT VFR BLD AUTO: 34.8 % (ref 34–46.6)
HGB BLD-MCNC: 12 G/DL (ref 12–15.9)
IMM GRANULOCYTES # BLD AUTO: 0.02 10*3/MM3 (ref 0–0.05)
IMM GRANULOCYTES NFR BLD AUTO: 0.4 % (ref 0–0.5)
LYMPHOCYTES # BLD AUTO: 0.94 10*3/MM3 (ref 0.7–3.1)
LYMPHOCYTES NFR BLD AUTO: 19.3 % (ref 19.6–45.3)
MCH RBC QN AUTO: 27.8 PG (ref 26.6–33)
MCHC RBC AUTO-ENTMCNC: 34.5 G/DL (ref 31.5–35.7)
MCV RBC AUTO: 80.7 FL (ref 79–97)
MONOCYTES # BLD AUTO: 0.45 10*3/MM3 (ref 0.1–0.9)
MONOCYTES NFR BLD AUTO: 9.2 % (ref 5–12)
NEUTROPHILS # BLD AUTO: 3.41 10*3/MM3 (ref 1.7–7)
NEUTROPHILS NFR BLD AUTO: 70.1 % (ref 42.7–76)
NRBC BLD AUTO-RTO: 0 /100 WBC (ref 0–0.2)
PLATELET # BLD AUTO: 184 10*3/MM3 (ref 140–450)
PMV BLD AUTO: 10.6 FL (ref 6–12)
POTASSIUM BLD-SCNC: 3 MMOL/L (ref 3.5–5.2)
PROT SERPL-MCNC: 6.5 G/DL (ref 6–8.5)
RBC # BLD AUTO: 4.31 10*6/MM3 (ref 3.77–5.28)
S PYO AG THROAT QL: NEGATIVE
SODIUM BLD-SCNC: 136 MMOL/L (ref 136–145)
WBC NRBC COR # BLD: 4.87 10*3/MM3 (ref 3.4–10.8)

## 2020-01-18 PROCEDURE — 87880 STREP A ASSAY W/OPTIC: CPT | Performed by: EMERGENCY MEDICINE

## 2020-01-18 PROCEDURE — 85025 COMPLETE CBC W/AUTO DIFF WBC: CPT | Performed by: EMERGENCY MEDICINE

## 2020-01-18 PROCEDURE — 87081 CULTURE SCREEN ONLY: CPT | Performed by: EMERGENCY MEDICINE

## 2020-01-18 PROCEDURE — 99283 EMERGENCY DEPT VISIT LOW MDM: CPT

## 2020-01-18 PROCEDURE — 80053 COMPREHEN METABOLIC PANEL: CPT | Performed by: EMERGENCY MEDICINE

## 2020-01-18 PROCEDURE — 87804 INFLUENZA ASSAY W/OPTIC: CPT | Performed by: EMERGENCY MEDICINE

## 2020-01-18 RX ORDER — OSELTAMIVIR PHOSPHATE 75 MG/1
75 CAPSULE ORAL 2 TIMES DAILY
Qty: 9 CAPSULE | Refills: 0 | Status: SHIPPED | OUTPATIENT
Start: 2020-01-18 | End: 2020-01-22

## 2020-01-18 RX ORDER — OSELTAMIVIR PHOSPHATE 75 MG/1
75 CAPSULE ORAL ONCE
Status: COMPLETED | OUTPATIENT
Start: 2020-01-18 | End: 2020-01-18

## 2020-01-18 RX ORDER — IBUPROFEN 400 MG/1
800 TABLET ORAL ONCE
Status: DISCONTINUED | OUTPATIENT
Start: 2020-01-18 | End: 2020-01-18

## 2020-01-18 RX ORDER — SODIUM CHLORIDE 0.9 % (FLUSH) 0.9 %
10 SYRINGE (ML) INJECTION AS NEEDED
Status: DISCONTINUED | OUTPATIENT
Start: 2020-01-18 | End: 2020-01-18 | Stop reason: HOSPADM

## 2020-01-18 RX ADMIN — OSELTAMIVIR PHOSPHATE 75 MG: 75 CAPSULE ORAL at 04:00

## 2020-01-18 RX ADMIN — SODIUM CHLORIDE 1000 ML: 9 INJECTION, SOLUTION INTRAVENOUS at 03:28

## 2020-01-18 NOTE — ED PROVIDER NOTES
Subjective   29-year-old female in the emergency department reporting fever, nausea, and vomiting that started earlier today.  Has not improved, so came to the emergency department for further evaluation and treatment.  Patient is currently 11 weeks pregnant.  Has a significant past medical history of anxiety and endometriosis.      History provided by:  Patient   used: No    Fever   Max temp prior to arrival:  101  Temp source:  Subjective  Severity:  Moderate  Onset quality:  Gradual  Timing:  Constant  Progression:  Worsening  Chronicity:  New  Relieved by:  Nothing  Worsened by:  Nothing  Ineffective treatments:  Acetaminophen  Associated symptoms: chills, nausea and vomiting    Associated symptoms: no chest pain, no confusion, no congestion, no cough, no diarrhea, no dysuria, no ear pain, no headaches, no myalgias, no rash, no rhinorrhea, no somnolence and no sore throat    Nausea:     Severity:  Mild    Onset quality:  Gradual    Timing:  Constant    Progression:  Worsening  Vomiting:     Quality:  Bilious material    Number of occurrences:  <5    Severity:  Mild    Timing:  Intermittent    Progression:  Worsening  Risk factors: no contaminated food, no contaminated water, no hx of cancer, no immunosuppression, no occupational exposure, no recent sickness, no recent travel and no sick contacts        Review of Systems   Constitutional: Positive for chills and fever.   HENT: Negative for congestion, ear pain, rhinorrhea and sore throat.    Respiratory: Negative for cough.    Cardiovascular: Negative for chest pain.   Gastrointestinal: Positive for nausea and vomiting. Negative for diarrhea.   Genitourinary: Negative for dysuria.   Musculoskeletal: Negative for myalgias.   Skin: Negative for rash.   Neurological: Negative for headaches.   Psychiatric/Behavioral: Negative for confusion.   All other systems reviewed and are negative.      Past Medical History:   Diagnosis Date   • Anxiety 2015    • Endometriosis 2016    Status post multiple laparoscopies   • Oral thrush 2012    hospitalized x 2 weeks       Allergies   Allergen Reactions   • Oxycodone Hcl Swelling   • Codeine Other (See Comments)     Increased Heart Rate       Past Surgical History:   Procedure Laterality Date   •  SECTION PRIMARY  2013   • D[facility] LAP[facility]COPY N/A [facility] Dr. Mora; endometrial pathology benign; laparoscopic findings consistent with endometriosis   • DIAGNOSTIC LAPAROSCOPY      [facility]; Dr. Mora; findings consistent with endometriosis   • DIAGNOSTIC LAPAROSCOPY N/A 2016    [facility]; Dr. Mora; diagnostic laparoscopy with fulguration of endometriosis   • DIAGNOSTIC LAPAROSCOPY N/A 2019    [facility]; Dr. Mora; findings consistent with endometriosis   • ENDOSCOPY  2016    Normal   • GANGLION CYST EXCISION Right    • SKIN BIOPSY  2017    on abdomen - all benign       Family History   Problem Relation Age of Onset   • Diabetes Sister    • Heart disease Maternal Grandmother    • Hypertension Maternal Grandmother        Social History     Socioeconomic History   • Marital status:      Spouse name: Not on file   • Number of children: Not on file   • Years of education: Not on file   • Highest education level: Not on file   Tobacco Use   • Smoking status: Never Smoker   • Smokeless tobacco: Never Used   Substance and Sexual Activity   • Alcohol use: No   • Drug use: No   • Sexual activity: Yes     Partners: Male     Birth control/protection: None           Objective   Physical Exam   Constitutional: She is oriented to person, place, and time. She appears well-developed and well-nourished.  Non-toxic appearance. No distress.   HENT:   Head: Normocephalic and atraumatic.   Right Ear: External ear normal.   Left Ear: External ear normal.   Nose: Nose normal.   Mouth/Throat: Oropharynx is clear and moist and mucous membranes are  normal. No oropharyngeal exudate. No tonsillar exudate.   Eyes: Pupils are equal, round, and reactive to light. Conjunctivae, EOM and lids are normal.   Neck: Normal range of motion and full passive range of motion without pain. Neck supple. No thyromegaly present.   Cardiovascular: Normal rate, regular rhythm, S1 normal, S2 normal, normal heart sounds, intact distal pulses and normal pulses.   Pulmonary/Chest: Effort normal and breath sounds normal. No tachypnea. No respiratory distress. She has no decreased breath sounds. She has no wheezes. She has no rales. She exhibits no tenderness.   Abdominal: Soft. Normal appearance and bowel sounds are normal. She exhibits no distension. There is no tenderness. There is no rebound and no guarding.   Musculoskeletal: Normal range of motion. She exhibits no edema, tenderness or deformity.   Lymphadenopathy:     She has no cervical adenopathy.   Neurological: She is alert and oriented to person, place, and time. She has normal strength. No cranial nerve deficit or sensory deficit. GCS eye subscore is 4. GCS verbal subscore is 5. GCS motor subscore is 6.   Skin: Skin is warm, dry and intact. No rash noted. She is not diaphoretic. No erythema. No pallor.   Psychiatric: She has a normal mood and affect. Her speech is normal and behavior is normal. Judgment and thought content normal. Cognition and memory are normal.   Nursing note and vitals reviewed.      Procedures           ED Course  ED Course as of Jan 18 0523   Sat Jan 18, 2020 0521 Patient reports feeling better with treatment in the emergency department, and is requested to be discharged home at this time.  Will return to the emergency department with any worsening symptoms.    [ES]      ED Course User Index  [ES] Uriel Butler MD                                               MDM  Number of Diagnoses or Management Options  Influenza A: new and requires workup     Amount and/or Complexity of Data  Reviewed  Clinical lab tests: reviewed and ordered  Tests in the radiology section of CPT®: ordered and reviewed  Tests in the medicine section of CPT®: ordered and reviewed  Review and summarize past medical records: yes  Independent visualization of images, tracings, or specimens: yes    Risk of Complications, Morbidity, and/or Mortality  Presenting problems: moderate  Diagnostic procedures: moderate  Management options: moderate    Patient Progress  Patient progress: stable      Final diagnoses:   Influenza Uriel Bailon MD  01/18/20 1834

## 2020-01-20 ENCOUNTER — TELEPHONE (OUTPATIENT)
Dept: OBSTETRICS AND GYNECOLOGY | Facility: CLINIC | Age: 30
End: 2020-01-20

## 2020-01-20 LAB — BACTERIA SPEC AEROBE CULT: NORMAL

## 2020-01-20 NOTE — TELEPHONE ENCOUNTER
Janie called, has been dx with Flu A, bronchitis and pnuemonia. Prescribed Delsym DM, amoxicillin and Phenergan. PCP wanted her to call to check that Dr Prince was ok with these meds. Advised her all these medications are safe in pregnancy and to call offcie is unable to keep fluids done for >24hrs. Pt verbalizes understanding.

## 2020-01-22 ENCOUNTER — ROUTINE PRENATAL (OUTPATIENT)
Dept: OBSTETRICS AND GYNECOLOGY | Facility: CLINIC | Age: 30
End: 2020-01-22

## 2020-01-22 VITALS — DIASTOLIC BLOOD PRESSURE: 62 MMHG | SYSTOLIC BLOOD PRESSURE: 100 MMHG | WEIGHT: 91 LBS | BODY MASS INDEX: 17.77 KG/M2

## 2020-01-22 DIAGNOSIS — O30.049 TWIN PREGNANCY, DICHORIONIC/DIAMNIOTIC, UNSPECIFIED TRIMESTER: ICD-10-CM

## 2020-01-22 DIAGNOSIS — O34.219 PREVIOUS CESAREAN DELIVERY AFFECTING PREGNANCY: Primary | ICD-10-CM

## 2020-01-22 LAB
2ND TRIMESTER 4 SCREEN SERPL-IMP: NORMAL
EXTERNAL NIPT: NORMAL

## 2020-01-22 PROCEDURE — 0502F SUBSEQUENT PRENATAL CARE: CPT | Performed by: OBSTETRICS & GYNECOLOGY

## 2020-01-22 RX ORDER — PROMETHAZINE HYDROCHLORIDE 25 MG/1
1 TABLET ORAL AS NEEDED
COMMUNITY
Start: 2020-01-20 | End: 2020-02-20

## 2020-01-22 NOTE — PROGRESS NOTES
Chief Complaint   Patient presents with   • Routine Prenatal Visit       HPI: Janie is a  currently at 11w5d who today reports the following:  Nausea - improved as compared to the prior visit; Vaginal bleeding -  No; Heartburn - No.    ROS:  GI: Constipation - No; Diarrhea - No    Neuro: Headache - No; Visual change - No      EXAM:  Vitals: See prenatal flowsheet   Abdomen: See prenatal flowsheet   Urine glucose/protein: See prenatal flowsheet   Pelvic: See prenatal flowsheet     Prenatal Labs  Lab Results   Component Value Date    HGB 12.0 2020    RUBELLAABIGG Positive 2019    HEPBSAG Non-Reactive 2019    ABSCRN Negative 2019    IJD1GVB3 Non-Reactive 2019    HEPCVIRUSABY Non-Reactive 2019    URINECX No growth 2019    CHLAMNAA Negative 2019    NGONORRHON Negative 2019       MDM:  Impression: 1. Supervision of high risk pregnancy  2. Previous C/S with route of delivery to be determined  3. Twin pregnancy - dichorionic, diamniotic   Tests done today: 1. none   Topics discussed: 1. Continue with PNV's  2. Prenatal labs reviewed  3. Today we discussed genetic testing.  She is aware that the MSAFP-4 and NIPT - Panorama is a screening test.  A screening test is not a diagnostic test.  This means that a negative test does not guarantee an unaffected fetus and a positive test does not mean the fetus has the condition for which the test is being performed.  If the test returns positive, a diagnostic test should be consider to determine if the fetus in fact has the condition.  After considering the options previously presented, she is not interested in having genetic testing performed.  4. She will try to get C/S op note for review (HAS NOT YET ARRIVED)   Tests scheduled today for her next visit:   none

## 2020-02-18 ENCOUNTER — TELEPHONE (OUTPATIENT)
Dept: OBSTETRICS AND GYNECOLOGY | Facility: CLINIC | Age: 30
End: 2020-02-18

## 2020-02-18 ENCOUNTER — LAB (OUTPATIENT)
Dept: LAB | Facility: HOSPITAL | Age: 30
End: 2020-02-18

## 2020-02-18 DIAGNOSIS — N30.00 ACUTE CYSTITIS WITHOUT HEMATURIA: ICD-10-CM

## 2020-02-18 DIAGNOSIS — N30.00 ACUTE CYSTITIS WITHOUT HEMATURIA: Primary | ICD-10-CM

## 2020-02-18 LAB
BILIRUB UR QL STRIP: NEGATIVE
CLARITY UR: CLEAR
COLOR UR: YELLOW
GLUCOSE UR STRIP-MCNC: ABNORMAL MG/DL
HGB UR QL STRIP.AUTO: NEGATIVE
KETONES UR QL STRIP: NEGATIVE
LEUKOCYTE ESTERASE UR QL STRIP.AUTO: NEGATIVE
NITRITE UR QL STRIP: NEGATIVE
PH UR STRIP.AUTO: 6 [PH] (ref 5–8)
PROT UR QL STRIP: NEGATIVE
SP GR UR STRIP: 1.03 (ref 1–1.03)
UROBILINOGEN UR QL STRIP: ABNORMAL

## 2020-02-18 PROCEDURE — 81003 URINALYSIS AUTO W/O SCOPE: CPT

## 2020-02-18 NOTE — TELEPHONE ENCOUNTER
Pt stated that she notice a dull ache in her lower back and when she urinate it burns, Pt was advised she needs to go to lab

## 2020-02-18 NOTE — TELEPHONE ENCOUNTER
Dr Prince    Patient calling and thinks she has an UTI and wants to know if she can take anything.    Pt call back [phone]    Julieta Almaraz [phone]

## 2020-02-19 ENCOUNTER — TELEPHONE (OUTPATIENT)
Dept: OBSTETRICS AND GYNECOLOGY | Facility: CLINIC | Age: 30
End: 2020-02-19

## 2020-02-20 ENCOUNTER — ROUTINE PRENATAL (OUTPATIENT)
Dept: OBSTETRICS AND GYNECOLOGY | Facility: CLINIC | Age: 30
End: 2020-02-20

## 2020-02-20 VITALS — WEIGHT: 100 LBS | SYSTOLIC BLOOD PRESSURE: 102 MMHG | BODY MASS INDEX: 19.53 KG/M2 | DIASTOLIC BLOOD PRESSURE: 60 MMHG

## 2020-02-20 DIAGNOSIS — O30.049 TWIN PREGNANCY, DICHORIONIC/DIAMNIOTIC, UNSPECIFIED TRIMESTER: Primary | ICD-10-CM

## 2020-02-20 PROCEDURE — 0502F SUBSEQUENT PRENATAL CARE: CPT | Performed by: OBSTETRICS & GYNECOLOGY

## 2020-02-20 NOTE — PROGRESS NOTES
Chief Complaint   Patient presents with   • Pregnancy Problem       HPI: Janie is a  currently at 15w6d who today reports the following:  Contractions - No; Leaking - No; Vaginal bleeding -  spotted early this week.  None since; Heartburn - YES.    ROS:  GI: Nausea - No ; Constipation - No; Diarrhea - No    Neuro: Headache - No ; Visual change - No      EXAM:  Vitals: See prenatal flowsheet   Abdomen: See prenatal flowsheet   Urine glucose/protein: See prenatal flowsheet   Pelvic: See prenatal flowsheet     Lab Results   Component Value Date    ABO A 2019    RH Positive 2019    ABSCRN Negative 2019       MDM:  Impression: 1. Supervision of high risk pregnancy  2. Previous C/S with route of delivery to be determined  3. Twin pregnancy - dichorionic, diamniotic  4. GERD in pregnancy   Tests done today: 1. U/S for cervical length - 35 mm   Topics discussed: 1. Continue with PNV's  2. Prenatal labs reviewed  3. OTC medical options for her GERD   Tests scheduled today for her next visit:   U/S for cervical length  U/S for anatomic screening

## 2020-03-19 ENCOUNTER — ROUTINE PRENATAL (OUTPATIENT)
Dept: OBSTETRICS AND GYNECOLOGY | Facility: CLINIC | Age: 30
End: 2020-03-19

## 2020-03-19 VITALS — WEIGHT: 108.6 LBS | BODY MASS INDEX: 21.21 KG/M2 | SYSTOLIC BLOOD PRESSURE: 104 MMHG | DIASTOLIC BLOOD PRESSURE: 60 MMHG

## 2020-03-19 DIAGNOSIS — O30.049 TWIN PREGNANCY, DICHORIONIC/DIAMNIOTIC, UNSPECIFIED TRIMESTER: Primary | ICD-10-CM

## 2020-03-19 PROCEDURE — 0502F SUBSEQUENT PRENATAL CARE: CPT | Performed by: OBSTETRICS & GYNECOLOGY

## 2020-03-19 NOTE — PROGRESS NOTES
"Chief Complaint   Patient presents with   • Routine Prenatal Visit     US today. pt states that she has a pain in the top of her stomach that is like a \"burning kind of pain\". states that is started after something up there popped.        HPI: Janie is a  currently at 19w6d who today reports the following:  Contractions - No; Leaking - No; Vaginal bleeding -  No; Heartburn - No.    ROS:  GI: Nausea - No ; Constipation - No; Diarrhea - No    Neuro: Headache - No ; Visual change - No      EXAM:  Vitals: See prenatal flowsheet   Abdomen: See prenatal flowsheet   Urine glucose/protein: See prenatal flowsheet   Pelvic: See prenatal flowsheet     Lab Results   Component Value Date    ABO A 2019    RH Positive 2019    ABSCRN Negative 2019       MDM:  Impression: 1. Supervision of high risk pregnancy  2. Previous C/S with planned repeat C/S  3. Twin pregnancy - dichorionic, diamniotic   Tests done today: 1. U/S for cervical length - 31 mm  2. U/S for anatomic screening - anatomy was not completely seen today   Topics discussed: 1. Continue with PNV's  2. Prenatal labs reviewed   Tests scheduled today for her next visit:   U/S for cervical length  U/S to complete the anatomic screening  U/S for EFW       "

## 2020-04-15 ENCOUNTER — ROUTINE PRENATAL (OUTPATIENT)
Dept: OBSTETRICS AND GYNECOLOGY | Facility: CLINIC | Age: 30
End: 2020-04-15

## 2020-04-15 VITALS — SYSTOLIC BLOOD PRESSURE: 106 MMHG | WEIGHT: 115 LBS | DIASTOLIC BLOOD PRESSURE: 62 MMHG | BODY MASS INDEX: 22.46 KG/M2

## 2020-04-15 DIAGNOSIS — O34.219 DECLINES VBAC (VAGINAL BIRTH AFTER CESAREAN) TRIAL: ICD-10-CM

## 2020-04-15 DIAGNOSIS — Z30.2 REQUEST FOR STERILIZATION: ICD-10-CM

## 2020-04-15 DIAGNOSIS — O30.049 TWIN PREGNANCY, DICHORIONIC/DIAMNIOTIC, UNSPECIFIED TRIMESTER: Primary | ICD-10-CM

## 2020-04-15 DIAGNOSIS — O34.219 PREVIOUS CESAREAN DELIVERY AFFECTING PREGNANCY: ICD-10-CM

## 2020-04-15 PROCEDURE — 59425 ANTEPARTUM CARE ONLY: CPT | Performed by: OBSTETRICS & GYNECOLOGY

## 2020-04-15 RX ORDER — SODIUM CHLORIDE 0.9 % (FLUSH) 0.9 %
3 SYRINGE (ML) INJECTION EVERY 12 HOURS SCHEDULED
Status: CANCELLED | OUTPATIENT
Start: 2020-04-15

## 2020-04-15 RX ORDER — SODIUM CHLORIDE, SODIUM LACTATE, POTASSIUM CHLORIDE, CALCIUM CHLORIDE 600; 310; 30; 20 MG/100ML; MG/100ML; MG/100ML; MG/100ML
125 INJECTION, SOLUTION INTRAVENOUS CONTINUOUS
Status: CANCELLED | OUTPATIENT
Start: 2020-04-15

## 2020-04-15 RX ORDER — PROMETHAZINE HYDROCHLORIDE 25 MG/ML
12.5 INJECTION, SOLUTION INTRAMUSCULAR; INTRAVENOUS EVERY 6 HOURS PRN
Status: CANCELLED | OUTPATIENT
Start: 2020-04-15 | End: 2020-04-17

## 2020-04-15 RX ORDER — MISOPROSTOL 100 UG/1
800 TABLET ORAL AS NEEDED
Status: CANCELLED | OUTPATIENT
Start: 2020-04-15

## 2020-04-15 RX ORDER — OXYTOCIN-SODIUM CHLORIDE 0.9% IV SOLN 30 UNIT/500ML 30-0.9/5 UT/ML-%
650 SOLUTION INTRAVENOUS ONCE
Status: CANCELLED | OUTPATIENT
Start: 2020-04-15

## 2020-04-15 RX ORDER — LIDOCAINE HYDROCHLORIDE 10 MG/ML
5 INJECTION, SOLUTION EPIDURAL; INFILTRATION; INTRACAUDAL; PERINEURAL AS NEEDED
Status: CANCELLED | OUTPATIENT
Start: 2020-04-15

## 2020-04-15 RX ORDER — PROMETHAZINE HYDROCHLORIDE 25 MG/1
12.5 SUPPOSITORY RECTAL EVERY 6 HOURS PRN
Status: CANCELLED | OUTPATIENT
Start: 2020-04-15 | End: 2020-04-17

## 2020-04-15 RX ORDER — TRISODIUM CITRATE DIHYDRATE AND CITRIC ACID MONOHYDRATE 500; 334 MG/5ML; MG/5ML
30 SOLUTION ORAL ONCE
Status: CANCELLED | OUTPATIENT
Start: 2020-04-15 | End: 2020-04-15

## 2020-04-15 RX ORDER — CARBOPROST TROMETHAMINE 250 UG/ML
250 INJECTION, SOLUTION INTRAMUSCULAR AS NEEDED
Status: CANCELLED | OUTPATIENT
Start: 2020-04-15

## 2020-04-15 RX ORDER — PROMETHAZINE HYDROCHLORIDE 25 MG/1
12.5 TABLET ORAL EVERY 6 HOURS PRN
Status: CANCELLED | OUTPATIENT
Start: 2020-04-15 | End: 2020-04-17

## 2020-04-15 RX ORDER — METHYLERGONOVINE MALEATE 0.2 MG/ML
200 INJECTION INTRAVENOUS ONCE AS NEEDED
Status: CANCELLED | OUTPATIENT
Start: 2020-04-15 | End: 2020-04-16

## 2020-04-15 RX ORDER — SODIUM CHLORIDE 0.9 % (FLUSH) 0.9 %
1-10 SYRINGE (ML) INJECTION AS NEEDED
Status: CANCELLED | OUTPATIENT
Start: 2020-04-15

## 2020-04-15 RX ORDER — OXYTOCIN-SODIUM CHLORIDE 0.9% IV SOLN 30 UNIT/500ML 30-0.9/5 UT/ML-%
85 SOLUTION INTRAVENOUS ONCE
Status: CANCELLED | OUTPATIENT
Start: 2020-04-15

## 2020-04-15 NOTE — PROGRESS NOTES
Chief Complaint   Patient presents with   • Routine Prenatal Visit       HPI: Janie is a  currently at 23w5d who today reports the following:  Contractions - No; Leaking - No; Vaginal bleeding - No; Heartburn - No.    ROS:  GI: Nausea - No ; Constipation - No; Diarrhea - No    Neuro: Headache - No ; Visual change - No    Respiratory: Cough - No; SOB - No; fever - No     EXAM:  Vitals: See prenatal flowsheet   Abdomen: See prenatal flowsheet   Urine glucose/protein: See prenatal flowsheet   Pelvic: See prenatal flowsheet     Lab Results   Component Value Date    ABO A 2019    RH Positive 2019    ABSCRN Negative 2019       MDM:  Impression: 1. Supervision of high risk pregnancy  2. Previous C/S with planned repeat C/S  3. Twin pregnancy - dichorionic, diamniotic   Tests ordered/done today: 1. U/S for EFW -  S = D   Counselin. Prenatal labs reviewed  2. Need to continue with PNV's  3. Questions answered regarding COVID-19 and revision of office schedule of visits due to pandemic   4. The methods of female sterilization were discussed.  I discussed partial salpingectomy done at  and total salpingectomy done at  with Janie.  I explained that the historical failure rate for tubal ligation has been quoted as ~ 1/300.  Data from the CREST study suggests that for certain techniques, the failure rates could be as high as ~ 4%.  Salpingectomy may reduce the failure rates historically seen with segmental salpingectomy.   Furthermore, total salpingectomy may be associated with a reduction in the lifetime incidence of ovarian cancer.  As compared to partial salpingectomy, total salpingectomy is a permanent method of female sterilization that cannot be reversed.  I explained to Janie that with failures, there is an increased risk of ectopic pregnancy.  Ruptured ectopic gestations can be life threatening if not treated.  Procedural risks include but not limited to the risk of  bleeding, infection, and damage to internal organs were discussed.   Additionally I discussed with Janie regret rate and post-tubal ligation syndrome.  I discussed with Janie that for all practical purposes the procedure should be considered permanent and non-reversable.  If there is not complete certainty regarding sterilization, long acting reversible contraception (LARC's) should be considered.  Other forms of LARC's that were reviewed include IUD - Mirena.  Additionally, non-contraceptive benefits of these methods should be considered in making her final decision.   Tests ordered today for her next visit:   GCT  HgB  U/S for EFW

## 2020-05-20 ENCOUNTER — ROUTINE PRENATAL (OUTPATIENT)
Dept: OBSTETRICS AND GYNECOLOGY | Facility: CLINIC | Age: 30
End: 2020-05-20

## 2020-05-20 ENCOUNTER — TELEPHONE (OUTPATIENT)
Dept: OBSTETRICS AND GYNECOLOGY | Facility: CLINIC | Age: 30
End: 2020-05-20

## 2020-05-20 ENCOUNTER — LAB (OUTPATIENT)
Dept: LAB | Facility: HOSPITAL | Age: 30
End: 2020-05-20

## 2020-05-20 VITALS — DIASTOLIC BLOOD PRESSURE: 62 MMHG | SYSTOLIC BLOOD PRESSURE: 110 MMHG

## 2020-05-20 DIAGNOSIS — O30.049 TWIN PREGNANCY, DICHORIONIC/DIAMNIOTIC, UNSPECIFIED TRIMESTER: Primary | ICD-10-CM

## 2020-05-20 DIAGNOSIS — O30.049 TWIN PREGNANCY, DICHORIONIC/DIAMNIOTIC, UNSPECIFIED TRIMESTER: ICD-10-CM

## 2020-05-20 DIAGNOSIS — O34.219 PREVIOUS CESAREAN DELIVERY AFFECTING PREGNANCY: ICD-10-CM

## 2020-05-20 PROBLEM — O99.019 ANEMIA IN PREGNANCY, UNSPECIFIED TRIMESTER: Status: ACTIVE | Noted: 2020-05-20

## 2020-05-20 LAB
GLUCOSE 1H P 100 G GLC PO SERPL-MCNC: 102 MG/DL (ref 65–140)
GLUCOSE UR STRIP-MCNC: NEGATIVE MG/DL
HCT VFR BLD AUTO: 31.2 % (ref 34–46.6)
HGB BLD-MCNC: 10 G/DL (ref 12–15.9)
PROT UR STRIP-MCNC: NEGATIVE MG/DL

## 2020-05-20 PROCEDURE — 85014 HEMATOCRIT: CPT

## 2020-05-20 PROCEDURE — 36415 COLL VENOUS BLD VENIPUNCTURE: CPT

## 2020-05-20 PROCEDURE — 85018 HEMOGLOBIN: CPT

## 2020-05-20 PROCEDURE — 82950 GLUCOSE TEST: CPT

## 2020-05-20 PROCEDURE — 99212 OFFICE O/P EST SF 10 MIN: CPT | Performed by: OBSTETRICS & GYNECOLOGY

## 2020-05-20 RX ORDER — FERROUS SULFATE TAB EC 324 MG (65 MG FE EQUIVALENT) 324 (65 FE) MG
1 TABLET DELAYED RESPONSE ORAL 2 TIMES DAILY
COMMUNITY
Start: 2020-05-07 | End: 2020-07-22

## 2020-05-20 NOTE — TELEPHONE ENCOUNTER
----- Message from Crystal Domínguez RN sent at 5/20/2020  3:17 PM EDT -----  Advised pt of results. She is taking one iron tablet daily before meals. I advised her to begin taking two tablets daily in divided doses as well as using Miralax for constipation.

## 2020-05-20 NOTE — PROGRESS NOTES
Chief Complaint   Patient presents with   • Routine Prenatal Visit     ob visit, c/o increased swelling.        HPI: Janie is a  currently at 28w5d who today reports the following:  Contractions - YES - but less than 4/hour AND no associated change in vaginal discharge; Leaking - No; Vaginal bleeding - No; Heartburn - No.    ROS:  GI: Nausea - No ; Constipation - No; Diarrhea - No    Neuro: Headache - No ; Visual change - No    Respiratory: Cough - No; SOB - No; fever - No     EXAM:  Vitals: See prenatal flowsheet   Abdomen: See prenatal flowsheet   Urine glucose/protein: See prenatal flowsheet   Pelvic: See prenatal flowsheet     Lab Results   Component Value Date    ABO A 2019    RH Positive 2019    ABSCRN Negative 2019       MDM:  Impression: 1. Supervision of high risk pregnancy  2. Previous C/S with planned repeat C/S  3. Twin pregnancy - dichorionic, diamniotic   Tests ordered/done today: 1. GCT  2. HgB  3. U/S for EFW - S=D   Counselin. Prenatal labs reviewed  2. Need to continue with PNV's  3. Questions answered regarding COVID-19 and revision of office schedule of visits due to pandemic  4.  labor signs and symptoms   5. Circumcision was discussed.  It was explained to Janie that the procedure is elective.  There are probably no long-term medical benefits for circumcision.  Studies have suggested in the first year of life, there may be a reduction in urinary tract infections in a circumcised male's.  This difference disappears after the first year of life.  Some have suggested that circumcision reduces nerve endings and the tip of the penis which could have an impact to him as he ages.  There are small risks of the procedure including taking off to much or too little skin from the foreskin.  He will be anesthetized and most of the time this is successful.  It cannot be guaranteed that the child will feel no pain.  After hearing this information, she wishes to have  her son circumsized.   Tests ordered today for her next visit:   none

## 2020-05-28 ENCOUNTER — HOSPITAL ENCOUNTER (OUTPATIENT)
Facility: HOSPITAL | Age: 30
Setting detail: OBSERVATION
Discharge: HOME | End: 2020-05-28
Attending: OBSTETRICS & GYNECOLOGY | Admitting: OBSTETRICS & GYNECOLOGY

## 2020-05-28 ENCOUNTER — TELEPHONE (OUTPATIENT)
Dept: OBSTETRICS AND GYNECOLOGY | Facility: CLINIC | Age: 30
End: 2020-05-28

## 2020-05-28 VITALS
HEIGHT: 60 IN | RESPIRATION RATE: 16 BRPM | TEMPERATURE: 98.5 F | HEART RATE: 81 BPM | BODY MASS INDEX: 24.54 KG/M2 | DIASTOLIC BLOOD PRESSURE: 71 MMHG | SYSTOLIC BLOOD PRESSURE: 116 MMHG | WEIGHT: 125 LBS

## 2020-05-28 PROBLEM — Z34.90 PREGNANCY: Status: ACTIVE | Noted: 2020-05-28

## 2020-05-28 LAB
BILIRUB UR QL STRIP: NEGATIVE
CLARITY UR: CLEAR
COLOR UR: YELLOW
GLUCOSE UR STRIP-MCNC: NEGATIVE MG/DL
HGB UR QL STRIP.AUTO: NEGATIVE
KETONES UR QL STRIP: NEGATIVE
LEUKOCYTE ESTERASE UR QL STRIP.AUTO: NEGATIVE
NITRAZINE EXPIRATION DATE: 0
NITRAZINE LOT NUMBER: 0
NITRITE UR QL STRIP: NEGATIVE
PH FLD: 0 [PH]
PH UR STRIP.AUTO: 6 [PH] (ref 5–8)
PROT UR QL STRIP: NEGATIVE
SP GR UR STRIP: 1.01 (ref 1–1.03)
UROBILINOGEN UR QL STRIP: NORMAL

## 2020-05-28 PROCEDURE — 81003 URINALYSIS AUTO W/O SCOPE: CPT | Performed by: OBSTETRICS & GYNECOLOGY

## 2020-05-28 PROCEDURE — 59025 FETAL NON-STRESS TEST: CPT

## 2020-05-28 PROCEDURE — G0378 HOSPITAL OBSERVATION PER HR: HCPCS

## 2020-05-28 PROCEDURE — 99218 PR INITIAL OBSERVATION CARE/DAY 30 MINUTES: CPT | Performed by: OBSTETRICS & GYNECOLOGY

## 2020-05-28 NOTE — TELEPHONE ENCOUNTER
Dr Pricne Pt    Pt is 29w 6d, twin gestation.  She had light contractions about 3 minutes apart for an hour and a half.  She also had a gush of white discharge.  No bleeding.  Pt is having lower abdominal pain not contractions and pressure today. Pt feels nauseous.  Pt feels babies moving. Pt has urgency to void.    Callback [phone]

## 2020-05-28 NOTE — TELEPHONE ENCOUNTER
Spoke with Janie, she is experiencing contractions every three minutes. Advised her to report to [facility] for evaluation.

## 2020-06-03 ENCOUNTER — ROUTINE PRENATAL (OUTPATIENT)
Dept: OBSTETRICS AND GYNECOLOGY | Facility: CLINIC | Age: 30
End: 2020-06-03

## 2020-06-03 VITALS — BODY MASS INDEX: 25.35 KG/M2 | WEIGHT: 129.8 LBS | DIASTOLIC BLOOD PRESSURE: 64 MMHG | SYSTOLIC BLOOD PRESSURE: 112 MMHG

## 2020-06-03 DIAGNOSIS — O34.219 DECLINES VBAC (VAGINAL BIRTH AFTER CESAREAN) TRIAL: ICD-10-CM

## 2020-06-03 DIAGNOSIS — O99.019 ANEMIA IN PREGNANCY, UNSPECIFIED TRIMESTER: ICD-10-CM

## 2020-06-03 DIAGNOSIS — Z30.2 REQUEST FOR STERILIZATION: ICD-10-CM

## 2020-06-03 DIAGNOSIS — O34.219 PREVIOUS CESAREAN DELIVERY AFFECTING PREGNANCY: ICD-10-CM

## 2020-06-03 DIAGNOSIS — O30.049 TWIN PREGNANCY, DICHORIONIC/DIAMNIOTIC, UNSPECIFIED TRIMESTER: Primary | ICD-10-CM

## 2020-06-03 PROCEDURE — 99213 OFFICE O/P EST LOW 20 MIN: CPT | Performed by: OBSTETRICS & GYNECOLOGY

## 2020-06-03 NOTE — PROGRESS NOTES
Chief Complaint   Patient presents with   • Routine Prenatal Visit     ob visit, c/o's swelling in lower ext       HPI: Janie is a  currently at 30w5d who today reports the following:  Contractions - No; Leaking - No; Vaginal bleeding -  No; Heartburn - No.    ROS:  GI: Nausea - No ; Constipation - No; Diarrhea - No    Neuro: Headache - No ; Visual change - No    Respiratory: Cough - No; SOB - No; fever - No     EXAM:  Vitals: See prenatal flowsheet   Abdomen: See prenatal flowsheet   Urine glucose/protein: See prenatal flowsheet   Pelvic: See prenatal flowsheet     Lab Results   Component Value Date    HGB 10.0 (L) 2020    HCT 31.2 (L) 2020     2020    ABO A 2019    RH Positive 2019    ABSCRN Negative 2019       MDM:  Impression: 1. Supervision of high risk pregnancy  2. Previous C/S with planned repeat C/S  3. Twin pregnancy - dichorionic, diamniotic  4. Anemia in pregnancy    Tests done today: 1. none   Topics discussed: 1. Continue with PNV's  2. Prenatal labs reviewed  3. Questions answered regarding COVID-19 and revision of office schedule of visits due to pandemic   Tests scheduled today for her next visit:   U/S for EFW

## 2020-06-06 ENCOUNTER — HOSPITAL ENCOUNTER (INPATIENT)
Facility: HOSPITAL | Age: 30
LOS: 3 days | Discharge: HOME | End: 2020-06-09
Attending: OBSTETRICS & GYNECOLOGY | Admitting: OBSTETRICS & GYNECOLOGY

## 2020-06-06 DIAGNOSIS — O30.049 TWIN PREGNANCY, DICHORIONIC/DIAMNIOTIC, UNSPECIFIED TRIMESTER: ICD-10-CM

## 2020-06-06 DIAGNOSIS — O34.219 DECLINES VBAC (VAGINAL BIRTH AFTER CESAREAN) TRIAL: ICD-10-CM

## 2020-06-06 DIAGNOSIS — Z30.2 REQUEST FOR STERILIZATION: ICD-10-CM

## 2020-06-06 DIAGNOSIS — O34.219 PREVIOUS CESAREAN DELIVERY AFFECTING PREGNANCY: ICD-10-CM

## 2020-06-06 PROBLEM — O30.003 TWIN GESTATION IN THIRD TRIMESTER: Status: ACTIVE | Noted: 2020-06-06

## 2020-06-06 LAB
ABO GROUP BLD: NORMAL
ALP SERPL-CCNC: 237 U/L (ref 39–117)
ALT SERPL W P-5'-P-CCNC: 7 U/L (ref 1–33)
AST SERPL-CCNC: 18 U/L (ref 1–32)
BILIRUB SERPL-MCNC: 0.3 MG/DL (ref 0.2–1.2)
BLD GP AB SCN SERPL QL: NEGATIVE
CREAT BLD-MCNC: 0.68 MG/DL (ref 0.57–1)
DEPRECATED RDW RBC AUTO: 44.7 FL (ref 37–54)
ERYTHROCYTE [DISTWIDTH] IN BLOOD BY AUTOMATED COUNT: 16.9 % (ref 12.3–15.4)
HCT VFR BLD AUTO: 32.1 % (ref 34–46.6)
HGB BLD-MCNC: 9.9 G/DL (ref 12–15.9)
LDH SERPL-CCNC: 275 U/L (ref 135–214)
MCH RBC QN AUTO: 23.2 PG (ref 26.6–33)
MCHC RBC AUTO-ENTMCNC: 30.8 G/DL (ref 31.5–35.7)
MCV RBC AUTO: 75.2 FL (ref 79–97)
PLATELET # BLD AUTO: 232 10*3/MM3 (ref 140–450)
PMV BLD AUTO: 11.2 FL (ref 6–12)
RBC # BLD AUTO: 4.27 10*6/MM3 (ref 3.77–5.28)
RH BLD: POSITIVE
T&S EXPIRATION DATE: NORMAL
URATE SERPL-MCNC: 6.7 MG/DL (ref 2.4–5.7)
WBC NRBC COR # BLD: 11.68 10*3/MM3 (ref 3.4–10.8)

## 2020-06-06 PROCEDURE — 82565 ASSAY OF CREATININE: CPT | Performed by: OBSTETRICS & GYNECOLOGY

## 2020-06-06 PROCEDURE — 25010000003 MAGNESIUM SULFATE 20 GM/500ML SOLUTION: Performed by: OBSTETRICS & GYNECOLOGY

## 2020-06-06 PROCEDURE — 84450 TRANSFERASE (AST) (SGOT): CPT | Performed by: OBSTETRICS & GYNECOLOGY

## 2020-06-06 PROCEDURE — 84075 ASSAY ALKALINE PHOSPHATASE: CPT | Performed by: OBSTETRICS & GYNECOLOGY

## 2020-06-06 PROCEDURE — 82247 BILIRUBIN TOTAL: CPT | Performed by: OBSTETRICS & GYNECOLOGY

## 2020-06-06 PROCEDURE — 86901 BLOOD TYPING SEROLOGIC RH(D): CPT | Performed by: OBSTETRICS & GYNECOLOGY

## 2020-06-06 PROCEDURE — 84460 ALANINE AMINO (ALT) (SGPT): CPT | Performed by: OBSTETRICS & GYNECOLOGY

## 2020-06-06 PROCEDURE — 63710000001 ONDANSETRON PER 8 MG: Performed by: OBSTETRICS & GYNECOLOGY

## 2020-06-06 PROCEDURE — 83615 LACTATE (LD) (LDH) ENZYME: CPT | Performed by: OBSTETRICS & GYNECOLOGY

## 2020-06-06 PROCEDURE — 86900 BLOOD TYPING SEROLOGIC ABO: CPT | Performed by: OBSTETRICS & GYNECOLOGY

## 2020-06-06 PROCEDURE — G0378 HOSPITAL OBSERVATION PER HR: HCPCS

## 2020-06-06 PROCEDURE — S0260 H&P FOR SURGERY: HCPCS | Performed by: OBSTETRICS & GYNECOLOGY

## 2020-06-06 PROCEDURE — 85027 COMPLETE CBC AUTOMATED: CPT | Performed by: OBSTETRICS & GYNECOLOGY

## 2020-06-06 PROCEDURE — 86850 RBC ANTIBODY SCREEN: CPT | Performed by: OBSTETRICS & GYNECOLOGY

## 2020-06-06 PROCEDURE — 84550 ASSAY OF BLOOD/URIC ACID: CPT | Performed by: OBSTETRICS & GYNECOLOGY

## 2020-06-06 RX ORDER — BETAMETHASONE SODIUM PHOSPHATE AND BETAMETHASONE ACETATE 3; 3 MG/ML; MG/ML
12 INJECTION, SUSPENSION INTRA-ARTICULAR; INTRALESIONAL; INTRAMUSCULAR; SOFT TISSUE ONCE
Status: DISCONTINUED | OUTPATIENT
Start: 2020-06-07 | End: 2020-06-07

## 2020-06-06 RX ORDER — NIFEDIPINE 10 MG/1
10 CAPSULE ORAL
Status: DISCONTINUED | OUTPATIENT
Start: 2020-06-06 | End: 2020-06-06

## 2020-06-06 RX ORDER — ACETAMINOPHEN 500 MG
1000 TABLET ORAL 4 TIMES DAILY PRN
Status: DISCONTINUED | OUTPATIENT
Start: 2020-06-06 | End: 2020-06-07

## 2020-06-06 RX ORDER — DEXTROSE, SODIUM CHLORIDE, AND POTASSIUM CHLORIDE 5; .45; .15 G/100ML; G/100ML; G/100ML
100 INJECTION INTRAVENOUS CONTINUOUS
Status: DISCONTINUED | OUTPATIENT
Start: 2020-06-06 | End: 2020-06-06

## 2020-06-06 RX ORDER — ONDANSETRON 2 MG/ML
4 INJECTION INTRAMUSCULAR; INTRAVENOUS EVERY 6 HOURS PRN
Status: DISCONTINUED | OUTPATIENT
Start: 2020-06-06 | End: 2020-06-06

## 2020-06-06 RX ORDER — SODIUM CHLORIDE, SODIUM LACTATE, POTASSIUM CHLORIDE, CALCIUM CHLORIDE 600; 310; 30; 20 MG/100ML; MG/100ML; MG/100ML; MG/100ML
125 INJECTION, SOLUTION INTRAVENOUS CONTINUOUS
Status: DISCONTINUED | OUTPATIENT
Start: 2020-06-06 | End: 2020-06-07

## 2020-06-06 RX ORDER — ONDANSETRON 4 MG/1
4 TABLET, FILM COATED ORAL EVERY 6 HOURS PRN
Status: DISCONTINUED | OUTPATIENT
Start: 2020-06-06 | End: 2020-06-07

## 2020-06-06 RX ORDER — MAGNESIUM SULFATE HEPTAHYDRATE 40 MG/ML
3 INJECTION, SOLUTION INTRAVENOUS CONTINUOUS
Status: DISCONTINUED | OUTPATIENT
Start: 2020-06-06 | End: 2020-06-07

## 2020-06-06 RX ADMIN — NIFEDIPINE 10 MG: 10 CAPSULE ORAL at 17:36

## 2020-06-06 RX ADMIN — NIFEDIPINE 10 MG: 10 CAPSULE ORAL at 20:11

## 2020-06-06 RX ADMIN — ONDANSETRON HYDROCHLORIDE 4 MG: 4 TABLET, FILM COATED ORAL at 17:36

## 2020-06-06 RX ADMIN — ACETAMINOPHEN 1000 MG: 500 TABLET, FILM COATED ORAL at 23:48

## 2020-06-06 RX ADMIN — ONDANSETRON HYDROCHLORIDE 4 MG: 4 TABLET, FILM COATED ORAL at 23:48

## 2020-06-06 RX ADMIN — MAGNESIUM SULFATE HEPTAHYDRATE 2 G/HR: 40 INJECTION, SOLUTION INTRAVENOUS at 21:29

## 2020-06-06 RX ADMIN — SODIUM CHLORIDE, POTASSIUM CHLORIDE, SODIUM LACTATE AND CALCIUM CHLORIDE 125 ML/HR: 600; 310; 30; 20 INJECTION, SOLUTION INTRAVENOUS at 21:14

## 2020-06-06 NOTE — H&P
[place]  Obstetric History and Physical    Referring Provider: No Known Provider      Chief Complaint   Patient presents with   • Laboring     ANN-MARIE, Twins       Subjective     Patient is a 29 y.o. female  currently at 31w1d dichorionic diamniotic twin gestation, who presents as a transfer via air ambulance from [facility] for  labor.  Janie was camping in the [facility] when she reported a mucoid vaginal discharge and increasing uterine activity.  She presented to [facility] was evaluated.  She reports her pain 8 on a scale of 10.  She was given IV morphine and 20 mg of Procardia p.o.  And AmniSure was performed and noted to be negative.  Her cervix was reported to be 1 cm dilated with no active vaginal bleeding.  Fetal tracings remained reassuring.  Prenatal care by .   course complicated by previous  delivery, dichorionic di-amniotic twin gestation, and anemia.      Her prenatal care is complicated by  prior   desires repeat  and multiple gestation  DC/DA twins.  Her previous obstetric/gynecological history is remarkable for .    The following portions of the patients history were reviewed and updated as appropriate: current medications, allergies, past medical history, past surgical history, past family history, past social history and problem list .       Prenatal Information:   Maternal Prenatal Labs  Blood Type No results found for: ABO   Rh Status No results found for: RH   Antibody Screen No results found for: ABSCRN   Gonnorhea No results found for: GCCX   Chlamydia No results found for: CLAMYDCU   RPR No results found for: RPR   Syphilis Antibody No results found for: SYPHILIS   Rubella No results found for: RUBELLAIGGIN   Hepatitis B Surface Antigen No results found for: HEPBSAG   HIV-1 Antibody No results found for: LABHIV1   Hepatitis C Antibody No results found for: HEPCAB   Rapid Urin Drug Screen  No results found for: AMPMETHU, BARBITSCNUR, LABBENZSCN, LABMETHSCN, LABOPIASCN, THCURSCR, COCAINEUR, AMPHETSCREEN, PROPOXSCN, BUPRENORSCNU, METAMPSCNUR, OXYCODONESCN, TRICYCLICSCN   Group B Strep Culture No results found for: GBSANTIGEN           External Prenatal Results     Pregnancy Outside Results - Transcribed From Office Records - See Scanned Records For Details     Test Value Date Time    Hgb 10.0 g/dL 05/20/20 0936      12.0 g/dL 01/18/20 0313      12.5 g/dL 01/09/20 1408      13.0 g/dL 12/18/19 1356      12.7 g/dL 12/14/19 1205    Hct 31.2 % 05/20/20 0936      34.8 % 01/18/20 0313      37.4 % 01/09/20 1408      38.0 % 12/18/19 1356      37.3 % 12/14/19 1205    ABO A  12/18/19 1356    Rh Positive  12/18/19 1356    Antibody Screen Negative  12/18/19 1356    Glucose Fasting GTT       Glucose Tolerance Test 1 hour       Glucose Tolerance Test 3 hour       Gonorrhea (discrete) Negative  12/18/19 1356    Chlamydia (discrete) Negative  12/18/19 1356    RPR Non-Reactive  12/18/19 1356    VDRL       Syphilis Antibody       Rubella Positive  12/18/19 1356    HBsAg Non-Reactive  12/18/19 1356    Herpes Simplex Virus PCR       Herpes Simplex VIrus Culture       HIV Non-Reactive  12/18/19 1356    Hep C RNA Quant PCR       Hep C Antibody Non-Reactive  12/18/19 1356    AFP       Group B Strep       GBS Susceptibility to Clindamycin       GBS Susceptibility to Erythromycin       Fetal Fibronectin       Genetic Testing, Maternal Blood             Drug Screening     Test Value Date Time    Urine Drug Screen       Amphetamine Screen Negative  12/18/19 1356    Barbiturate Screen Negative  12/18/19 1356    Benzodiazepine Screen Negative  12/18/19 1356    Methadone Screen Negative  12/18/19 1356    Phencyclidine Screen Negative  12/18/19 1356    Opiates Screen Negative  12/18/19 1356    THC Screen Negative  12/18/19 1356    Cocaine Screen       Propoxyphene Screen Negative  12/18/19 1356    Buprenorphine Screen Negative   19 1356    Methamphetamine Screen       Oxycodone Screen Negative  19 1356    Tricyclic Antidepressants Screen Negative  19 1356                  Past OB History:       OB History    Para Term  AB Living   2 1 1 0 0 1   SAB TAB Ectopic Molar Multiple Live Births   0 0 0 0 0 1      # Outcome Date GA Lbr Elmer/2nd Weight Sex Delivery Anes PTL Lv   2 Current            1 Term 13   2381 g (5 lb 4 oz) F CS-LTranv   YOVANNY      Complications: Breech presentation      Name: Cara      Obstetric Comments    - Cara       Past Medical History: Past Medical History:   Diagnosis Date   • Anemia    • Anxiety    • Endometriosis 2016    Status post multiple laparoscopies   • Oral thrush 2012    hospitalized x 2 weeks      Past Surgical History Past Surgical History:   Procedure Laterality Date   •  SECTION PRIMARY  2013    LTCS (2 layer)   • [facility] Dr. Mora; endometrial pathology benign; laparoscopic findings consistent with endometriosis   • DIAGNOSTIC LAPAROSCOPY      [facility]; Dr. Mora; findings consistent with endometriosis   • DIAGNOSTIC LAPAROSCOPY N/A 2016    [facility]; Dr. Mora; diagnostic laparoscopy with fulguration of endometriosis   • DIAGNOSTIC [facility]COPY [facility]A [facility] Dr. Mor[facility]; findings consistent with endometriosis   • ENDOSCOPY  2016    Normal   • GANGLION CYST EXCISION Right    • SKIN BIOPSY  2017    on abdomen - all benign      Family History: Family History   Problem Relation Age of Onset   • Diabetes Sister    • Heart disease Maternal Grandmother    • Hypertension Maternal Grandmother       Social History:  reports that she has never smoked. She has never used smokeless tobacco.   reports that she does not drink alcohol.   reports that she does not use drugs.                   General ROS Negative Findings:Headaches, Visual Changes,  Epigastric pain, Anorexia and Vaginal Bleeding    ROS     All other systems have been reviewed and are neg  Objective       Vital Signs Range for the last 24 hours  Temperature:     Temp Source:     BP:     Pulse:     Respirations:     SPO2:     O2 Amount (l/min):     O2 Devices     Weight:       Physical Examination:   General:   alert, appears stated age and cooperative   Skin:   normal   HEENT:  Sclera clear   Lungs:   clear to auscultation bilaterally   Heart:   regular rate and rhythm, S1, S2 normal, no murmur, click, rub or gallop   Abdomen:  Soft, gravid uterus, guarding, rebound benign exam   Lower Extremities  1+ edema, no calf transfer range of motion   Pelvis:  External genitalia: normal general appearance  Uterus: enlarged         Presentation: vtx   Cervix: Exam by:     Dilation:  1cm   Effacement:  65   Station:  -1       Fetal Heart Rate Assessment   Method:     Beats/min:     Baseline:     Varibility:     Accels:     Decels:     Tracing Category:       Uterine Assessment   Method:     Frequency (min):     Ctx Count in 10 min:     Duration:     Intensity:     Intensity by IUPC:     Resting Tone:     Resting Tone by IUPC:     [place] Units:       Laboratory Results:   Lab Results (last 24 hours)     ** No results found for the last 24 hours. **        Radiology Review:   Imaging Results (Last 24 Hours)     ** No results found for the last 24 hours. **        Other Studies:    Assessment/Plan       Di-di twins    Previous LTCS (2 layer) - repeat C/S & BTL  @ 09:00    Request for sterilization - Consents signed    Anemia in pregnancy, unspecified trimester    Twin gestation in third trimester        Assessment:  1.  Intrauterine pregnancy at 31w1 Di/Di twin gestation with reactive fetal status.    2.   contractions without evidence of rupture membranes  3.  History of previous  delivery-she plans to have repeat section with tubal ligation  4.  Anemia    Plan:  1. OBS, complete  course of steroids for fetal immature enhancement, continue Procardia 10 mg every 4 hours for uterine tocolysis  2. Plan of care has been reviewed with patient.  3.  Risks, benefits of treatment plan have been discussed.  4.  All questions have been answered.  5 discussed with Dr.Youkilis James Flores DO  6/6/2020  17:31

## 2020-06-07 ENCOUNTER — ANESTHESIA EVENT (OUTPATIENT)
Dept: LABOR AND DELIVERY | Facility: HOSPITAL | Age: 30
End: 2020-06-07

## 2020-06-07 ENCOUNTER — ANESTHESIA (OUTPATIENT)
Dept: LABOR AND DELIVERY | Facility: HOSPITAL | Age: 30
End: 2020-06-07

## 2020-06-07 PROBLEM — O30.049 TWIN PREGNANCY, DICHORIONIC/DIAMNIOTIC, UNSPECIFIED TRIMESTER: Status: RESOLVED | Noted: 2019-12-18 | Resolved: 2020-06-07

## 2020-06-07 PROBLEM — O34.219 PREVIOUS CESAREAN DELIVERY AFFECTING PREGNANCY: Status: RESOLVED | Noted: 2019-12-18 | Resolved: 2020-06-07

## 2020-06-07 PROBLEM — O34.219 DECLINES VBAC (VAGINAL BIRTH AFTER CESAREAN) TRIAL: Status: RESOLVED | Noted: 2020-04-15 | Resolved: 2020-06-07

## 2020-06-07 PROBLEM — O99.019 ANEMIA IN PREGNANCY, UNSPECIFIED TRIMESTER: Status: RESOLVED | Noted: 2020-05-20 | Resolved: 2020-06-07

## 2020-06-07 PROCEDURE — 25010000003 MORPHINE PER 10 MG: Performed by: ANESTHESIOLOGY

## 2020-06-07 PROCEDURE — 25010000002 METOCLOPRAMIDE PER 10 MG: Performed by: ANESTHESIOLOGY

## 2020-06-07 PROCEDURE — 25010000002 BETAMETHASONE ACET & SOD PHOS PER 4 MG: Performed by: OBSTETRICS & GYNECOLOGY

## 2020-06-07 PROCEDURE — 25010000002 DIPHENHYDRAMINE PER 50 MG: Performed by: ANESTHESIOLOGY

## 2020-06-07 PROCEDURE — 25010000002 METHYLERGONOVINE MALEATE PER 0.2 MG: Performed by: ANESTHESIOLOGY

## 2020-06-07 PROCEDURE — 59515 CESAREAN DELIVERY: CPT | Performed by: OBSTETRICS & GYNECOLOGY

## 2020-06-07 PROCEDURE — 25010000002 KETOROLAC TROMETHAMINE PER 15 MG: Performed by: OBSTETRICS & GYNECOLOGY

## 2020-06-07 PROCEDURE — 25010000002 MIDAZOLAM PER 1 MG: Performed by: ANESTHESIOLOGY

## 2020-06-07 PROCEDURE — 25010000002 FENTANYL CITRATE (PF) 100 MCG/2ML SOLUTION: Performed by: ANESTHESIOLOGY

## 2020-06-07 PROCEDURE — 25010000003 CEFAZOLIN IN DEXTROSE 2-4 GM/100ML-% SOLUTION: Performed by: OBSTETRICS & GYNECOLOGY

## 2020-06-07 PROCEDURE — 88307 TISSUE EXAM BY PATHOLOGIST: CPT | Performed by: OBSTETRICS & GYNECOLOGY

## 2020-06-07 PROCEDURE — 25010000002 ONDANSETRON PER 1 MG: Performed by: ANESTHESIOLOGY

## 2020-06-07 RX ORDER — NALOXONE HCL 0.4 MG/ML
0.4 VIAL (ML) INJECTION
Status: DISCONTINUED | OUTPATIENT
Start: 2020-06-07 | End: 2020-06-08

## 2020-06-07 RX ORDER — OXYCODONE HYDROCHLORIDE AND ACETAMINOPHEN 5; 325 MG/1; MG/1
2 TABLET ORAL EVERY 4 HOURS PRN
Status: DISCONTINUED | OUTPATIENT
Start: 2020-06-07 | End: 2020-06-07

## 2020-06-07 RX ORDER — IBUPROFEN 600 MG/1
600 TABLET ORAL EVERY 6 HOURS PRN
Status: DISCONTINUED | OUTPATIENT
Start: 2020-06-07 | End: 2020-06-07

## 2020-06-07 RX ORDER — MISOPROSTOL 200 UG/1
200 TABLET ORAL ONCE
Status: DISCONTINUED | OUTPATIENT
Start: 2020-06-07 | End: 2020-06-07

## 2020-06-07 RX ORDER — PROMETHAZINE HYDROCHLORIDE 25 MG/ML
12.5 INJECTION, SOLUTION INTRAMUSCULAR; INTRAVENOUS EVERY 6 HOURS PRN
Status: DISCONTINUED | OUTPATIENT
Start: 2020-06-07 | End: 2020-06-07 | Stop reason: HOSPADM

## 2020-06-07 RX ORDER — KETOROLAC TROMETHAMINE 30 MG/ML
30 INJECTION, SOLUTION INTRAMUSCULAR; INTRAVENOUS EVERY 6 HOURS PRN
Status: DISCONTINUED | OUTPATIENT
Start: 2020-06-07 | End: 2020-06-07 | Stop reason: SDUPTHER

## 2020-06-07 RX ORDER — HYDROCODONE BITARTRATE AND ACETAMINOPHEN 5; 325 MG/1; MG/1
1 TABLET ORAL EVERY 4 HOURS PRN
Status: DISCONTINUED | OUTPATIENT
Start: 2020-06-07 | End: 2020-06-09 | Stop reason: HOSPADM

## 2020-06-07 RX ORDER — PROMETHAZINE HYDROCHLORIDE 12.5 MG/1
12.5 SUPPOSITORY RECTAL EVERY 6 HOURS PRN
Status: DISCONTINUED | OUTPATIENT
Start: 2020-06-07 | End: 2020-06-07

## 2020-06-07 RX ORDER — MIDAZOLAM HYDROCHLORIDE 1 MG/ML
INJECTION INTRAMUSCULAR; INTRAVENOUS AS NEEDED
Status: DISCONTINUED | OUTPATIENT
Start: 2020-06-07 | End: 2020-06-07 | Stop reason: SURG

## 2020-06-07 RX ORDER — PROMETHAZINE HYDROCHLORIDE 12.5 MG/1
12.5 TABLET ORAL EVERY 6 HOURS PRN
Status: DISCONTINUED | OUTPATIENT
Start: 2020-06-07 | End: 2020-06-07

## 2020-06-07 RX ORDER — DIPHENHYDRAMINE HCL 25 MG
25 CAPSULE ORAL EVERY 4 HOURS PRN
Status: DISCONTINUED | OUTPATIENT
Start: 2020-06-07 | End: 2020-06-09 | Stop reason: HOSPADM

## 2020-06-07 RX ORDER — FENTANYL CITRATE 50 UG/ML
INJECTION, SOLUTION INTRAMUSCULAR; INTRAVENOUS AS NEEDED
Status: DISCONTINUED | OUTPATIENT
Start: 2020-06-07 | End: 2020-06-07 | Stop reason: SURG

## 2020-06-07 RX ORDER — PROMETHAZINE HYDROCHLORIDE 25 MG/ML
12.5 INJECTION, SOLUTION INTRAMUSCULAR; INTRAVENOUS EVERY 6 HOURS PRN
Status: DISCONTINUED | OUTPATIENT
Start: 2020-06-07 | End: 2020-06-07

## 2020-06-07 RX ORDER — METHYLERGONOVINE MALEATE 0.2 MG/ML
200 INJECTION INTRAVENOUS ONCE AS NEEDED
Status: DISCONTINUED | OUTPATIENT
Start: 2020-06-07 | End: 2020-06-07 | Stop reason: SDUPTHER

## 2020-06-07 RX ORDER — ONDANSETRON 4 MG/1
4 TABLET, FILM COATED ORAL EVERY 6 HOURS PRN
Status: DISCONTINUED | OUTPATIENT
Start: 2020-06-07 | End: 2020-06-09 | Stop reason: HOSPADM

## 2020-06-07 RX ORDER — OXYTOCIN-SODIUM CHLORIDE 0.9% IV SOLN 30 UNIT/500ML 30-0.9/5 UT/ML-%
650 SOLUTION INTRAVENOUS ONCE
Status: DISCONTINUED | OUTPATIENT
Start: 2020-06-07 | End: 2020-06-07

## 2020-06-07 RX ORDER — ONDANSETRON 2 MG/ML
4 INJECTION INTRAMUSCULAR; INTRAVENOUS EVERY 6 HOURS PRN
Status: DISCONTINUED | OUTPATIENT
Start: 2020-06-07 | End: 2020-06-09 | Stop reason: HOSPADM

## 2020-06-07 RX ORDER — ONDANSETRON 2 MG/ML
INJECTION INTRAMUSCULAR; INTRAVENOUS AS NEEDED
Status: DISCONTINUED | OUTPATIENT
Start: 2020-06-07 | End: 2020-06-07 | Stop reason: SURG

## 2020-06-07 RX ORDER — METOCLOPRAMIDE HYDROCHLORIDE 5 MG/ML
INJECTION INTRAMUSCULAR; INTRAVENOUS AS NEEDED
Status: DISCONTINUED | OUTPATIENT
Start: 2020-06-07 | End: 2020-06-07 | Stop reason: SURG

## 2020-06-07 RX ORDER — OXYTOCIN-SODIUM CHLORIDE 0.9% IV SOLN 30 UNIT/500ML 30-0.9/5 UT/ML-%
85 SOLUTION INTRAVENOUS ONCE
Status: COMPLETED | OUTPATIENT
Start: 2020-06-07 | End: 2020-06-07

## 2020-06-07 RX ORDER — CARBOPROST TROMETHAMINE 250 UG/ML
250 INJECTION, SOLUTION INTRAMUSCULAR AS NEEDED
Status: DISCONTINUED | OUTPATIENT
Start: 2020-06-07 | End: 2020-06-07

## 2020-06-07 RX ORDER — OXYCODONE HYDROCHLORIDE AND ACETAMINOPHEN 5; 325 MG/1; MG/1
1 TABLET ORAL EVERY 4 HOURS PRN
Status: DISCONTINUED | OUTPATIENT
Start: 2020-06-07 | End: 2020-06-07

## 2020-06-07 RX ORDER — MISOPROSTOL 200 UG/1
300 TABLET ORAL ONCE
Status: DISCONTINUED | OUTPATIENT
Start: 2020-06-07 | End: 2020-06-07

## 2020-06-07 RX ORDER — ONDANSETRON 2 MG/ML
4 INJECTION INTRAMUSCULAR; INTRAVENOUS ONCE
Status: DISCONTINUED | OUTPATIENT
Start: 2020-06-07 | End: 2020-06-07 | Stop reason: HOSPADM

## 2020-06-07 RX ORDER — MISOPROSTOL 200 UG/1
400 TABLET ORAL ONCE
Status: COMPLETED | OUTPATIENT
Start: 2020-06-07 | End: 2020-06-07

## 2020-06-07 RX ORDER — FAMOTIDINE 10 MG/ML
INJECTION, SOLUTION INTRAVENOUS AS NEEDED
Status: DISCONTINUED | OUTPATIENT
Start: 2020-06-07 | End: 2020-06-07 | Stop reason: SURG

## 2020-06-07 RX ORDER — PROMETHAZINE HYDROCHLORIDE 12.5 MG/1
12.5 SUPPOSITORY RECTAL EVERY 6 HOURS PRN
Status: DISCONTINUED | OUTPATIENT
Start: 2020-06-07 | End: 2020-06-07 | Stop reason: HOSPADM

## 2020-06-07 RX ORDER — OXYTOCIN-SODIUM CHLORIDE 0.9% IV SOLN 30 UNIT/500ML 30-0.9/5 UT/ML-%
85 SOLUTION INTRAVENOUS ONCE
Status: DISCONTINUED | OUTPATIENT
Start: 2020-06-07 | End: 2020-06-07

## 2020-06-07 RX ORDER — SODIUM CHLORIDE, SODIUM LACTATE, POTASSIUM CHLORIDE, CALCIUM CHLORIDE 600; 310; 30; 20 MG/100ML; MG/100ML; MG/100ML; MG/100ML
125 INJECTION, SOLUTION INTRAVENOUS CONTINUOUS
Status: DISCONTINUED | OUTPATIENT
Start: 2020-06-07 | End: 2020-06-08

## 2020-06-07 RX ORDER — PROMETHAZINE HYDROCHLORIDE 12.5 MG/1
12.5 TABLET ORAL EVERY 6 HOURS PRN
Status: DISCONTINUED | OUTPATIENT
Start: 2020-06-07 | End: 2020-06-07 | Stop reason: HOSPADM

## 2020-06-07 RX ORDER — OXYTOCIN 10 [USP'U]/ML
INJECTION, SOLUTION INTRAMUSCULAR; INTRAVENOUS AS NEEDED
Status: DISCONTINUED | OUTPATIENT
Start: 2020-06-07 | End: 2020-06-07 | Stop reason: SURG

## 2020-06-07 RX ORDER — DEXTROSE, SODIUM CHLORIDE, SODIUM LACTATE, POTASSIUM CHLORIDE, AND CALCIUM CHLORIDE 5; .6; .31; .03; .02 G/100ML; G/100ML; G/100ML; G/100ML; G/100ML
125 INJECTION, SOLUTION INTRAVENOUS CONTINUOUS
Status: DISCONTINUED | OUTPATIENT
Start: 2020-06-07 | End: 2020-06-08

## 2020-06-07 RX ORDER — MISOPROSTOL 200 UG/1
800 TABLET ORAL AS NEEDED
Status: DISCONTINUED | OUTPATIENT
Start: 2020-06-07 | End: 2020-06-07

## 2020-06-07 RX ORDER — IBUPROFEN 600 MG/1
600 TABLET ORAL EVERY 6 HOURS SCHEDULED
Status: DISCONTINUED | OUTPATIENT
Start: 2020-06-07 | End: 2020-06-09 | Stop reason: HOSPADM

## 2020-06-07 RX ORDER — MORPHINE SULFATE 2 MG/ML
2 INJECTION, SOLUTION INTRAMUSCULAR; INTRAVENOUS
Status: DISCONTINUED | OUTPATIENT
Start: 2020-06-07 | End: 2020-06-07

## 2020-06-07 RX ORDER — SIMETHICONE 80 MG
80 TABLET,CHEWABLE ORAL
Status: DISCONTINUED | OUTPATIENT
Start: 2020-06-07 | End: 2020-06-09 | Stop reason: HOSPADM

## 2020-06-07 RX ORDER — BUPIVACAINE HYDROCHLORIDE 7.5 MG/ML
INJECTION, SOLUTION EPIDURAL; RETROBULBAR AS NEEDED
Status: DISCONTINUED | OUTPATIENT
Start: 2020-06-07 | End: 2020-06-07 | Stop reason: SURG

## 2020-06-07 RX ORDER — TRISODIUM CITRATE DIHYDRATE AND CITRIC ACID MONOHYDRATE 500; 334 MG/5ML; MG/5ML
SOLUTION ORAL
Status: COMPLETED
Start: 2020-06-07 | End: 2020-06-07

## 2020-06-07 RX ORDER — DIPHENHYDRAMINE HYDROCHLORIDE 50 MG/ML
25 INJECTION INTRAMUSCULAR; INTRAVENOUS EVERY 4 HOURS PRN
Status: DISCONTINUED | OUTPATIENT
Start: 2020-06-07 | End: 2020-06-09 | Stop reason: HOSPADM

## 2020-06-07 RX ORDER — SIMETHICONE 80 MG
80 TABLET,CHEWABLE ORAL 4 TIMES DAILY PRN
Status: DISCONTINUED | OUTPATIENT
Start: 2020-06-07 | End: 2020-06-07

## 2020-06-07 RX ORDER — OXYTOCIN-SODIUM CHLORIDE 0.9% IV SOLN 30 UNIT/500ML 30-0.9/5 UT/ML-%
650 SOLUTION INTRAVENOUS ONCE
Status: DISCONTINUED | OUTPATIENT
Start: 2020-06-07 | End: 2020-06-07 | Stop reason: HOSPADM

## 2020-06-07 RX ORDER — CEFAZOLIN SODIUM 2 G/100ML
2 INJECTION, SOLUTION INTRAVENOUS ONCE
Status: COMPLETED | OUTPATIENT
Start: 2020-06-07 | End: 2020-06-07

## 2020-06-07 RX ORDER — MORPHINE SULFATE 0.5 MG/ML
INJECTION, SOLUTION EPIDURAL; INTRATHECAL; INTRAVENOUS AS NEEDED
Status: DISCONTINUED | OUTPATIENT
Start: 2020-06-07 | End: 2020-06-07 | Stop reason: SURG

## 2020-06-07 RX ORDER — LANOLIN
CREAM (ML) TOPICAL
Status: DISCONTINUED | OUTPATIENT
Start: 2020-06-07 | End: 2020-06-09 | Stop reason: HOSPADM

## 2020-06-07 RX ORDER — BETAMETHASONE SODIUM PHOSPHATE AND BETAMETHASONE ACETATE 3; 3 MG/ML; MG/ML
12 INJECTION, SUSPENSION INTRA-ARTICULAR; INTRALESIONAL; INTRAMUSCULAR; SOFT TISSUE ONCE
Status: COMPLETED | OUTPATIENT
Start: 2020-06-07 | End: 2020-06-07

## 2020-06-07 RX ORDER — METHYLERGONOVINE MALEATE 0.2 MG/ML
200 INJECTION INTRAVENOUS ONCE AS NEEDED
Status: DISCONTINUED | OUTPATIENT
Start: 2020-06-07 | End: 2020-06-07 | Stop reason: HOSPADM

## 2020-06-07 RX ORDER — MISOPROSTOL 100 MCG
100 TABLET ORAL ONCE
Status: DISCONTINUED | OUTPATIENT
Start: 2020-06-07 | End: 2020-06-07

## 2020-06-07 RX ORDER — MORPHINE SULFATE 2 MG/ML
2 INJECTION, SOLUTION INTRAMUSCULAR; INTRAVENOUS
Status: DISCONTINUED | OUTPATIENT
Start: 2020-06-07 | End: 2020-06-07 | Stop reason: HOSPADM

## 2020-06-07 RX ORDER — HYDROCODONE BITARTRATE AND ACETAMINOPHEN 5; 325 MG/1; MG/1
2 TABLET ORAL EVERY 4 HOURS PRN
Status: DISCONTINUED | OUTPATIENT
Start: 2020-06-07 | End: 2020-06-09 | Stop reason: HOSPADM

## 2020-06-07 RX ORDER — DOCUSATE SODIUM 100 MG/1
100 CAPSULE, LIQUID FILLED ORAL 2 TIMES DAILY
Status: DISCONTINUED | OUTPATIENT
Start: 2020-06-07 | End: 2020-06-09 | Stop reason: HOSPADM

## 2020-06-07 RX ORDER — CARBOPROST TROMETHAMINE 250 UG/ML
250 INJECTION, SOLUTION INTRAMUSCULAR AS NEEDED
Status: DISCONTINUED | OUTPATIENT
Start: 2020-06-07 | End: 2020-06-07 | Stop reason: HOSPADM

## 2020-06-07 RX ORDER — METHYLERGONOVINE MALEATE 0.2 MG/ML
INJECTION INTRAVENOUS AS NEEDED
Status: DISCONTINUED | OUTPATIENT
Start: 2020-06-07 | End: 2020-06-07 | Stop reason: SURG

## 2020-06-07 RX ORDER — DIPHENHYDRAMINE HCL 25 MG
25 CAPSULE ORAL EVERY 4 HOURS PRN
Status: DISCONTINUED | OUTPATIENT
Start: 2020-06-07 | End: 2020-06-07 | Stop reason: SDUPTHER

## 2020-06-07 RX ADMIN — DOCUSATE SODIUM 100 MG: 100 CAPSULE, LIQUID FILLED ORAL at 08:25

## 2020-06-07 RX ADMIN — DIPHENHYDRAMINE HYDROCHLORIDE 25 MG: 50 INJECTION INTRAMUSCULAR; INTRAVENOUS at 18:28

## 2020-06-07 RX ADMIN — HYDROCODONE BITARTRATE AND ACETAMINOPHEN 2 TABLET: 5; 325 TABLET ORAL at 20:18

## 2020-06-07 RX ADMIN — OXYTOCIN 3 UNITS: 10 INJECTION, SOLUTION INTRAMUSCULAR; INTRAVENOUS at 02:42

## 2020-06-07 RX ADMIN — BUPIVACAINE HYDROCHLORIDE 1.2 ML: 7.5 INJECTION, SOLUTION EPIDURAL; RETROBULBAR at 02:19

## 2020-06-07 RX ADMIN — HYDROCODONE BITARTRATE AND ACETAMINOPHEN 2 TABLET: 5; 325 TABLET ORAL at 06:54

## 2020-06-07 RX ADMIN — FENTANYL CITRATE 20 MCG: 50 INJECTION, SOLUTION INTRAMUSCULAR; INTRAVENOUS at 02:19

## 2020-06-07 RX ADMIN — OXYTOCIN 85 ML/HR: 10 INJECTION, SOLUTION INTRAMUSCULAR; INTRAVENOUS at 03:26

## 2020-06-07 RX ADMIN — MISOPROSTOL 400 MCG: 200 TABLET ORAL at 03:43

## 2020-06-07 RX ADMIN — OXYTOCIN 30 UNITS: 10 INJECTION, SOLUTION INTRAMUSCULAR; INTRAVENOUS at 03:10

## 2020-06-07 RX ADMIN — HYDROCODONE BITARTRATE AND ACETAMINOPHEN 1 TABLET: 5; 325 TABLET ORAL at 10:58

## 2020-06-07 RX ADMIN — DOCUSATE SODIUM 100 MG: 100 CAPSULE, LIQUID FILLED ORAL at 20:18

## 2020-06-07 RX ADMIN — MIDAZOLAM 1 MG: 1 INJECTION INTRAMUSCULAR; INTRAVENOUS at 02:12

## 2020-06-07 RX ADMIN — OXYTOCIN 30 UNITS: 10 INJECTION, SOLUTION INTRAMUSCULAR; INTRAVENOUS at 02:40

## 2020-06-07 RX ADMIN — OXYTOCIN 3 UNITS: 10 INJECTION, SOLUTION INTRAMUSCULAR; INTRAVENOUS at 02:45

## 2020-06-07 RX ADMIN — Medication: at 06:54

## 2020-06-07 RX ADMIN — MISOPROSTOL 400 MCG: 200 TABLET ORAL at 03:48

## 2020-06-07 RX ADMIN — OXYTOCIN 30 UNITS: 10 INJECTION, SOLUTION INTRAMUSCULAR; INTRAVENOUS at 02:53

## 2020-06-07 RX ADMIN — SODIUM CHLORIDE, POTASSIUM CHLORIDE, SODIUM LACTATE AND CALCIUM CHLORIDE: 600; 310; 30; 20 INJECTION, SOLUTION INTRAVENOUS at 02:54

## 2020-06-07 RX ADMIN — METHYLERGONOVINE MALEATE 200 MCG: 0.2 INJECTION INTRAVENOUS at 02:43

## 2020-06-07 RX ADMIN — SODIUM CITRATE AND CITRIC ACID MONOHYDRATE 30 ML: 500; 334 SOLUTION ORAL at 02:02

## 2020-06-07 RX ADMIN — HYDROCODONE BITARTRATE AND ACETAMINOPHEN 2 TABLET: 5; 325 TABLET ORAL at 15:33

## 2020-06-07 RX ADMIN — SIMETHICONE 80 MG: 80 TABLET, CHEWABLE ORAL at 18:24

## 2020-06-07 RX ADMIN — METOCLOPRAMIDE 10 MG: 5 INJECTION, SOLUTION INTRAMUSCULAR; INTRAVENOUS at 02:12

## 2020-06-07 RX ADMIN — IBUPROFEN 600 MG: 600 TABLET, FILM COATED ORAL at 12:50

## 2020-06-07 RX ADMIN — FAMOTIDINE 10 MG: 10 INJECTION, SOLUTION INTRAVENOUS at 02:13

## 2020-06-07 RX ADMIN — ONDANSETRON 4 MG: 2 INJECTION INTRAMUSCULAR; INTRAVENOUS at 02:13

## 2020-06-07 RX ADMIN — KETOROLAC TROMETHAMINE 30 MG: 30 INJECTION, SOLUTION INTRAMUSCULAR at 04:25

## 2020-06-07 RX ADMIN — SIMETHICONE 80 MG: 80 TABLET, CHEWABLE ORAL at 20:18

## 2020-06-07 RX ADMIN — MORPHINE SULFATE 0.1 MG: 0.5 INJECTION, SOLUTION EPIDURAL; INTRATHECAL; INTRAVENOUS at 02:19

## 2020-06-07 RX ADMIN — BETAMETHASONE SODIUM PHOSPHATE AND BETAMETHASONE ACETATE 12 MG: 3; 3 INJECTION, SUSPENSION INTRA-ARTICULAR; INTRALESIONAL; INTRAMUSCULAR at 01:16

## 2020-06-07 RX ADMIN — SODIUM CHLORIDE, POTASSIUM CHLORIDE, SODIUM LACTATE AND CALCIUM CHLORIDE 125 ML/HR: 600; 310; 30; 20 INJECTION, SOLUTION INTRAVENOUS at 03:20

## 2020-06-07 RX ADMIN — IBUPROFEN 600 MG: 600 TABLET, FILM COATED ORAL at 18:24

## 2020-06-07 RX ADMIN — CEFAZOLIN SODIUM 2 G: 2 INJECTION, SOLUTION INTRAVENOUS at 02:07

## 2020-06-07 RX ADMIN — SIMETHICONE 80 MG: 80 TABLET, CHEWABLE ORAL at 08:25

## 2020-06-07 NOTE — ANESTHESIA PROCEDURE NOTES
Spinal Block      Patient reassessed immediately prior to procedure    Patient location during procedure: OB  Indication:procedure for pain  Performed By  Anesthesiologist: Barbara Cruz DO  Preanesthetic Checklist  Completed: patient identified, surgical consent, pre-op evaluation, timeout performed, IV checked, risks and benefits discussed and monitors and equipment checked  Spinal Block Prep:  Patient Position:sitting  Sterile Tech:cap, gloves, mask and sterile barriers  Prep:Betadine  Patient Monitoring:blood pressure monitoring and EKG  Spinal Block Procedure  Approach:midline  Guidance:palpation technique  Location:L4-L5  Needle Type:Andrea  Needle Gauge:25 G  Placement of Spinal needle event:cerebrospinal fluid aspirated  Paresthesia: no  Fluid Appearance:clear     Post Assessment  Patient Tolerance:patient tolerated the procedure well with no apparent complications  Complications no

## 2020-06-07 NOTE — PROGRESS NOTES
[place]  Janie Hollis  : 1990  MRN: [id number]  CSN: [id number]    Hospital Day: 2      Antepartum Progress Note    Subjective   She continues to contract painfully.  Does not think she can tolerate going up to a higher dose of magnesium.     Objective     Min/max vitals past 24 hours:   Temp  Min: 97.8 °F (36.6 °C)  Max: 98.3 °F (36.8 °C)  BP  Min: 109/57  Max: 127/72  Pulse  Min: 87  Max: 124  Resp  Min: 16  Max: 18         General: well developed; well nourished  no acute distress   Heart: Not performed.   Lungs: breathing is unlabored   Abdomen: soft, non-tender; no masses  no umbilical or inguinal hernias are present  no hepato-splenomegaly   FHT's: reassuring and category 1 both twin A and twin B   Cervix:  Was recently checked by staff.  She is progressed both in terms of dilation and effacement.  Most recent exam 1-2/80%   Contractions: regular every 3-4 minutes           Assessment   1. DC/DA twins at 31w2d  2.  labor failed tocolysis with magnesium sulfate and oral Procardia  3. Previous  section     Plan   1. Procedure repeat  section  2. Discussed with Janie ketty.  Previously had signed consents.  Given the  gestation it was my recommendation that we delay tubal ligation at this time.  If at 6 weeks postpartum both twins are doing well and she wishes to proceed with laparoscopic sterilization, I think that is a better plan  3. Options of Mirena as a contraceptive option also briefly discussed  4. Antibiotic prophylaxis ordered and magnesium discontinued    Yassine Prince MD  2020  01:36

## 2020-06-07 NOTE — ANESTHESIA POSTPROCEDURE EVALUATION
Patient: Janie Hollis    Procedure Summary     Date:  20 Room / Location:  [facility] LABOR DELIVERY 2 /  [facility] LABOR DELIVERY    Anesthesia Start:  211 Anesthesia Stop:      Procedure:   SECTION REPEAT (N/A Abdomen) Diagnosis:      Surgeon:  Yassine Prince MD Provider:  Barbara Cruz DO    Anesthesia Type:  spinal, ITN ASA Status:  2 - Emergent          Anesthesia Type: spinal, ITN    Vitals  Vitals Value Taken Time   /47 2020  3:20 AM   Temp 97.6 °F (36.4 °C) 2020  3:20 AM   Pulse 96 2020  3:21 AM   Resp 16 2020  3:20 AM   SpO2 99 % 2020  3:21 AM   Vitals shown include unvalidated device data.        Post Anesthesia Care and Evaluation    Patient location during evaluation: bedside  Patient participation: complete - patient participated  Level of consciousness: awake and awake and alert  Pain score: 0  Pain management: satisfactory to patient  Airway patency: patent  Anesthetic complications: No anesthetic complications  PONV Status: none  Cardiovascular status: acceptable  Respiratory status: acceptable  Hydration status: acceptable  Post Neuraxial Block status: No signs or symptoms of PDPH

## 2020-06-07 NOTE — OP NOTE
Jimenez Hollis  : 1990  MRN: [id number]  CSN: [id number]     Section Operative Note    Pre-Operative Dx:   1. Dichorionic diamniotic twins at 31w2d weeks   2. Previous  section  3. Declines   4.  labor failed to localize      Postoperative dx:    1. Intrauterine pregnancy at 31w2d weeks - delivered 2.   Same        Procedure: Repeat  (LTCS)  - 1 layer closure       Surgeon: Yassine Prince MD       Anesthesia: Spinal       EBL: 800 mls.   IV Fluids: 2000 mls.   UOP: 500 mls.     Antibiotics: cefazolin 2 gms     Infant      Name:  Rocky Huitron    Gender: A - Male infant  B - Male infant     Procedure Details:   After the patient was adequately anesthetized, she was sterilely prepped and draped in the dorsal supine left lateral tilt position. Her previous Pfannenstiel incision was elliptically excised with a knife. The incision was carried down to the fascia with the Bovie.  The fascia was cut transversely with the knife and extended in a curvilinear fashion with scissors. The fascia was freed from its midline insertion superiorly and inferiorly. Rectus muscles were  in the midline. The peritoneum was sharply entered and a bladder flap was sharply created. The lower uterine segment was scored transversely with the knife. Clear amniotic fluid was seen.  Twin A was in vertex presentation.  The head was delivered atraumatically. The mouth and nose were bulb suctioned.  The cord was clamped and the infant was handed to the delivery team which was in attendance.  Amniotomy for twin B was performed in this fetus was found to be in footling breech presentation.  The feet were grasped and extracted through the incision.  Traction was made until the scapula could be seen.  The arms were swept anteriorly across the chest and the head was delivered in flexion.  The placentas were spontaneously extracted. The uterus was exteriorized and wiped free of  debris and clot. The uterine incision was closed with #1 chromic in a continuous running locking fashion. The bladder flap was reapproximated.  Lower uterine segment was somewhat boggy and for this reason a dose of Methergine was given.    The uterus was returned to the abdomen. The paracolic gutters were cleared of debris and clot. Parietal peritoneum and posterior rectus sheath were closed with 3-0 Vicryl. Rectus muscles were approximated with #1 chromic. The fascia was closed with 0 PDS sewing from either corner, tying in the midline and burying all knots. The subcutaneous tissue was copiously irrigated. Gorge's fascia was closed with 3-0 Vicryl and the skin was closed with the end sorb stapler and skin glue was applied.  All counts were correct.         Complications:   None      Disposition:   Mother to Mother Baby/Postpartum  in stable condition currently.   Babies both to NICU  in stable condition currently.       Yassine Prince MD  6/7/2020  03:24

## 2020-06-08 LAB
HCT VFR BLD AUTO: 26 % (ref 34–46.6)
HGB BLD-MCNC: 7.7 G/DL (ref 12–15.9)

## 2020-06-08 PROCEDURE — 85018 HEMOGLOBIN: CPT | Performed by: OBSTETRICS & GYNECOLOGY

## 2020-06-08 PROCEDURE — 63710000001 DIPHENHYDRAMINE PER 50 MG: Performed by: ANESTHESIOLOGY

## 2020-06-08 PROCEDURE — 85014 HEMATOCRIT: CPT | Performed by: OBSTETRICS & GYNECOLOGY

## 2020-06-08 RX ADMIN — HYDROCODONE BITARTRATE AND ACETAMINOPHEN 2 TABLET: 5; 325 TABLET ORAL at 10:23

## 2020-06-08 RX ADMIN — HYDROCODONE BITARTRATE AND ACETAMINOPHEN 2 TABLET: 5; 325 TABLET ORAL at 23:40

## 2020-06-08 RX ADMIN — DOCUSATE SODIUM 100 MG: 100 CAPSULE, LIQUID FILLED ORAL at 08:34

## 2020-06-08 RX ADMIN — SIMETHICONE 80 MG: 80 TABLET, CHEWABLE ORAL at 13:26

## 2020-06-08 RX ADMIN — HYDROCODONE BITARTRATE AND ACETAMINOPHEN 2 TABLET: 5; 325 TABLET ORAL at 05:33

## 2020-06-08 RX ADMIN — HYDROCODONE BITARTRATE AND ACETAMINOPHEN 2 TABLET: 5; 325 TABLET ORAL at 18:31

## 2020-06-08 RX ADMIN — IBUPROFEN 600 MG: 600 TABLET, FILM COATED ORAL at 13:27

## 2020-06-08 RX ADMIN — DIPHENHYDRAMINE HYDROCHLORIDE 25 MG: 25 CAPSULE ORAL at 00:05

## 2020-06-08 RX ADMIN — HYDROCODONE BITARTRATE AND ACETAMINOPHEN 2 TABLET: 5; 325 TABLET ORAL at 14:45

## 2020-06-08 RX ADMIN — IBUPROFEN 600 MG: 600 TABLET, FILM COATED ORAL at 19:42

## 2020-06-08 RX ADMIN — IBUPROFEN 600 MG: 600 TABLET, FILM COATED ORAL at 05:33

## 2020-06-08 RX ADMIN — DOCUSATE SODIUM 100 MG: 100 CAPSULE, LIQUID FILLED ORAL at 19:42

## 2020-06-08 RX ADMIN — SIMETHICONE 80 MG: 80 TABLET, CHEWABLE ORAL at 08:33

## 2020-06-08 RX ADMIN — SIMETHICONE 80 MG: 80 TABLET, CHEWABLE ORAL at 21:53

## 2020-06-08 RX ADMIN — SIMETHICONE 80 MG: 80 TABLET, CHEWABLE ORAL at 17:39

## 2020-06-08 NOTE — PROGRESS NOTES
[facility] Jimenez Hollis  : 1990  MRN: [id number]  CSN: [id number]    Hospital Day: 3    Post-operative Day #1  Subjective       CC: hospital follow-up    Her pain is well controlled. Vaginal bleeding is normal in amount. She is ambulating without difficulty. She is passing gas. She is voiding without difficulty.     Objective     Min/max vitals past 24 hours:   Temp  Min: 97.7 °F (36.5 °C)  Max: 98.5 °F (36.9 °C)  BP  Min: 105/52  Max: 126/58  Pulse  Min: 58  Max: 83  Resp  Min: 16  Max: 18        General: well developed; well nourished  no acute distress   Abdomen: soft, non-tender; no masses  no umbilical or inguinal hernias are present  no hepato-splenomegaly  incision is clean, dry, intact and without drainage   Pelvic: Not performed   Ext: Calves NT     Last 3 values   Results from last 7 days   Lab Units 20  0843 20  1735   WBC 10*3/mm3  --  11.68*   HEMOGLOBIN g/dL 7.7* 9.9*   HEMATOCRIT % 26.0* 32.1*   PLATELETS 10*3/mm3  --  232     Lab Results   Component Value Date    RH Positive 2020    HEPBSAG Non-Reactive 2019          Assessment   1. POD #1 S/P Repeat  (LTCS)  2. Postpartum anemia     Plan   1. Continue routine post-operative care  2. Ambulate  3. Advance diet    Yassine Prince MD  2020  13:29

## 2020-06-08 NOTE — LACTATION NOTE
Mom  first child for 9 months.  NICU pumping education done.     06/08/20 1350   Maternal Information   Date of Referral 06/08/20   Person Making Referral physician;other (see comments)  (courtesy)   Maternal Reason for Referral multiple births   Infant Reason for Referral NICU admission   Maternal Infant Feeding   Maternal Emotional State relaxed   Equipment Type   Breast Pump Type double electric, hospital grade;other (see comments)  (Rx given)

## 2020-06-09 VITALS
HEART RATE: 59 BPM | DIASTOLIC BLOOD PRESSURE: 65 MMHG | OXYGEN SATURATION: 100 % | TEMPERATURE: 98.4 F | RESPIRATION RATE: 16 BRPM | SYSTOLIC BLOOD PRESSURE: 125 MMHG

## 2020-06-09 PROBLEM — O30.049 DICHORIONIC DIAMNIOTIC TWIN GESTATION: Status: ACTIVE | Noted: 2020-06-09

## 2020-06-09 PROBLEM — O30.049 DICHORIONIC DIAMNIOTIC TWIN GESTATION: Status: RESOLVED | Noted: 2020-06-09 | Resolved: 2020-06-09

## 2020-06-09 LAB
CYTO UR: NORMAL
LAB AP CASE REPORT: NORMAL
LAB AP CLINICAL INFORMATION: NORMAL
PATH REPORT.FINAL DX SPEC: NORMAL
PATH REPORT.GROSS SPEC: NORMAL

## 2020-06-09 RX ORDER — HYDROCODONE BITARTRATE AND ACETAMINOPHEN 5; 325 MG/1; MG/1
1-2 TABLET ORAL EVERY 4 HOURS PRN
Qty: 18 TABLET | Refills: 0 | Status: SHIPPED | OUTPATIENT
Start: 2020-06-09 | End: 2020-06-16 | Stop reason: SDUPTHER

## 2020-06-09 RX ORDER — IBUPROFEN 200 MG
200-600 TABLET ORAL EVERY 6 HOURS SCHEDULED
Refills: 0
Start: 2020-06-09 | End: 2020-06-19

## 2020-06-09 RX ADMIN — DOCUSATE SODIUM 100 MG: 100 CAPSULE, LIQUID FILLED ORAL at 08:03

## 2020-06-09 RX ADMIN — IBUPROFEN 600 MG: 600 TABLET, FILM COATED ORAL at 03:59

## 2020-06-09 RX ADMIN — SIMETHICONE 80 MG: 80 TABLET, CHEWABLE ORAL at 08:03

## 2020-06-09 RX ADMIN — IBUPROFEN 600 MG: 600 TABLET, FILM COATED ORAL at 10:11

## 2020-06-09 RX ADMIN — HYDROCODONE BITARTRATE AND ACETAMINOPHEN 2 TABLET: 5; 325 TABLET ORAL at 08:06

## 2020-06-09 RX ADMIN — HYDROCODONE BITARTRATE AND ACETAMINOPHEN 2 TABLET: 5; 325 TABLET ORAL at 03:59

## 2020-06-09 RX ADMIN — HYDROCODONE BITARTRATE AND ACETAMINOPHEN 2 TABLET: 5; 325 TABLET ORAL at 13:47

## 2020-06-09 NOTE — PROGRESS NOTES
Jimenez Hollis  : 1990  MRN: [id number]  CSN: [id number]    Hospital Day: 4    Post-operative Day #2  Subjective     CC: hospital follow-up    Her pain is well controlled. Vaginal bleeding is normal in amount. She is ambulating without difficulty. She is passing gas. Her bowels are working normally. She is voiding without difficulty. She wants to go home today.     Objective     Min/max vitals past 24 hours:   Temp  Min: 98 °F (36.7 °C)  Max: 98.5 °F (36.9 °C)  BP  Min: 110/76  Max: 125/65  Pulse  Min: 59  Max: 83  Resp  Min: 16  Max: 18        General: well developed; well nourished  no acute distress   Abdomen: soft, non-tender; no masses  no umbilical or inguinal hernias are present  no hepato-splenomegaly  incision is clean, dry, intact and without drainage   Pelvic: Not performed   Ext: Calves NT     Results from last 7 days   Lab Units 20  0843 20  1735   WBC 10*3/mm3  --  11.68*   HEMOGLOBIN g/dL 7.7* 9.9*   HEMATOCRIT % 26.0* 32.1*   PLATELETS 10*3/mm3  --  232     Lab Results   Component Value Date    RH Positive 2020    HEPBSAG Non-Reactive 2019        Assessment   1. POD #2 S/P Repeat  (LTCS)  2. Postpartum anemia     Plan   1. Discharge to home  2. Advised no tampons or intercourse for 6 weeks.  3. D/C questions all answered  4. Follow-up appointment in 2 week(s)  5. Explained need for oral iron for 3-4 months    Yassine Prince MD  2020  11:30

## 2020-06-09 NOTE — PAYOR COMM NOTE
"Fuad Hollis (29 y.o. Female)   Auth#[id number]  Delivery information/Clinical update     Date of Birth Social Security Number Address Home Phone MRN    1990  [address]  [place] [address] [phone] [id number]    Anabaptist Marital Status          None        Admission Date Admission Type Admitting Provider Attending Provider Department, Room/Bed    20 Elective Yassine Prince MD Youkilis, Bradley B, MD [facility] MOTHER BABY 4B, N427/1    Discharge Date Discharge Disposition Discharge Destination                       Attending Provider:  Yassine Prince MD    Allergies:  Oxycodone Hcl, Codeine    Isolation:  None   Infection:  None   Code Status:  CPR    Ht:  152.4 cm (60\")   Wt:  58.9 kg (129 lb 12.8 oz)    Admission Cmt:  None   Principal Problem:  Postpartum care following rLTCS (1 layer) 2020 - Rocky Huitron [Z39.2]                 Active Insurance as of 2020     Primary Coverage     Payor Plan Insurance Group Employer/Plan Group    [employer] [place]      Payor Plan Address Payor Plan Phone Number Payor Plan Fax Number Effective Dates    [address] [address] [phone]  2020 - None Entered    [facility] [address]       Subscriber Name Subscriber Birth Date Member ID       FUAD HOLLIS 1990 [id number]                 Emergency Contacts      (Rel.) Home Phone Work Phone Mobile Phone    ALISHA HOLLIS (Spouse) [phone] -- --              Lab Results (last 24 hours)     ** No results found for the last 24 hours. **        Imaging Results (Last 24 Hours)     ** No results found for the last 24 hours. **           Operative/Procedure Notes (last 72 hours) (Notes from 20 1132 through 20 1132)      Yassine Prince MD at 20 0230           Jimenez Hollis  : 1990  MRN: [id number]  CSN: [id number]     Section Operative Note    Pre-Operative Dx: "   1. Dichorionic diamniotic twins at 31w2d weeks   2. Previous  section  3. Declines   4.  labor failed to localize      Postoperative dx:    1. Intrauterine pregnancy at 31w2d weeks - delivered 2.   Same        Procedure: Repeat  (LTCS)  - 1 layer closure       Surgeon: Yassine Prince MD       Anesthesia: Spinal       EBL: 800 mls.   IV Fluids: 2000 mls.   UOP: 500 mls.     Antibiotics: cefazolin 2 gms     Infant      Name:  Rocky Huitron    Gender: A - Male infant  B - Male infant     Procedure Details:   After the patient was adequately anesthetized, she was sterilely prepped and draped in the dorsal supine left lateral tilt position. Her previous Pfannenstiel incision was elliptically excised with a knife. The incision was carried down to the fascia with the Bovie.  The fascia was cut transversely with the knife and extended in a curvilinear fashion with scissors. The fascia was freed from its midline insertion superiorly and inferiorly. Rectus muscles were  in the midline. The peritoneum was sharply entered and a bladder flap was sharply created. The lower uterine segment was scored transversely with the knife. Clear amniotic fluid was seen.  Twin A was in vertex presentation.  The head was delivered atraumatically. The mouth and nose were bulb suctioned.  The cord was clamped and the infant was handed to the delivery team which was in attendance.  Amniotomy for twin B was performed in this fetus was found to be in footling breech presentation.  The feet were grasped and extracted through the incision.  Traction was made until the scapula could be seen.  The arms were swept anteriorly across the chest and the head was delivered in flexion.  The placentas were spontaneously extracted. The uterus was exteriorized and wiped free of debris and clot. The uterine incision was closed with #1 chromic in a continuous running locking fashion. The bladder flap was  reapproximated.  Lower uterine segment was somewhat boggy and for this reason a dose of Methergine was given.    The uterus was returned to the abdomen. The paracolic gutters were cleared of debris and clot. Parietal peritoneum and posterior rectus sheath were closed with 3-0 Vicryl. Rectus muscles were approximated with #1 chromic. The fascia was closed with 0 PDS sewing from either corner, tying in the midline and burying all knots. The subcutaneous tissue was copiously irrigated. Gorge's fascia was closed with 3-0 Vicryl and the skin was closed with the end sorb stapler and skin glue was applied.  All counts were correct.         Complications:   None      Disposition:   Mother to Mother Baby/Postpartum  in stable condition currently.   Babies both to NICU  in stable condition currently.       Yassine Prince MD  2020  03:24        Electronically signed by Yassine Prince MD at 20 0329          Physician Progress Notes (last 72 hours) (Notes from 20 1132 through 20 1132)      Yassine Prince MD at 20 1130          [facility]  : 1990  MRN: [id number]  CSN: [id number]    Hospital Day: 4    Post-operative Day #2  Subjective     CC: hospital follow-up    Her pain is well controlled. Vaginal bleeding is normal in amount. She is ambulating without difficulty. She is passing gas. Her bowels are working normally. She is voiding without difficulty. She wants to go home today.    Objective     Min/max vitals past 24 hours:   Temp  Min: 98 °F (36.7 °C)  Max: 98.5 °F (36.9 °C)  BP  Min: 110/76  Max: 125/65  Pulse  Min: 59  Max: 83  Resp  Min: 16  Max: 18        General: well developed; well nourished  no acute distress   Abdomen: soft, non-tender; no masses  no umbilical or inguinal hernias are present  no hepato-splenomegaly  incision is clean, dry, intact and without drainage   Pelvic: Not performed   Ext: Calves NT     Results from last 7 days   Lab  Units 20  0843 20  1735   WBC 10*3/mm3  --  11.68*   HEMOGLOBIN g/dL 7.7* 9.9*   HEMATOCRIT % 26.0* 32.1*   PLATELETS 10*3/mm3  --  232     Lab Results   Component Value Date    RH Positive 2020    HEPBSAG Non-Reactive 2019       Assessment   3. POD #2 S/P Repeat  (LTCS)  4. Postpartum anemia    Plan   1. Discharge to home  2. Advised no tampons or intercourse for 6 weeks.  3. D/C questions all answered  4. Follow-up appointment in 2 week(s)  5. Explained need for oral iron for 3-4 months    Yassine Prince MD  2020  11:30          Electronically signed by Yassine Prince MD at 20 1131     Yassine Prince MD at 20 1329           Jimenez Lima Bunny  : 1990  MRN: [id number]  CSN: [id number]    Hospital Day: 3    Post-operative Day #1  Subjective       CC: hospital follow-up    Her pain is well controlled. Vaginal bleeding is normal in amount. She is ambulating without difficulty. She is passing gas. She is voiding without difficulty.    Objective     Min/max vitals past 24 hours:   Temp  Min: 97.7 °F (36.5 °C)  Max: 98.5 °F (36.9 °C)  BP  Min: 105/52  Max: 126/58  Pulse  Min: 58  Max: 83  Resp  Min: 16  Max: 18        General: well developed; well nourished  no acute distress   Abdomen: soft, non-tender; no masses  no umbilical or inguinal hernias are present  no hepato-splenomegaly  incision is clean, dry, intact and without drainage   Pelvic: Not performed   Ext: Calves NT     Last 3 values   Results from last 7 days   Lab Units 20  0843 20  1735   WBC 10*3/mm3  --  11.68*   HEMOGLOBIN g/dL 7.7* 9.9*   HEMATOCRIT % 26.0* 32.1*   PLATELETS 10*3/mm3  --  232     Lab Results   Component Value Date    RH Positive 2020    HEPBSAG Non-Reactive 2019         Assessment   5. POD #1 S/P Repeat  (LTCS)  6. Postpartum anemia    Plan   6. Continue routine post-operative care  7. Ambulate  8. Advance diet    Yassine  TONY Prince MD  2020  13:29          Electronically signed by Yassine Prince MD at 20 1329     Yassine Prince MD at 20 0136          [facility]  Janie Hollis  : 1990  MRN: [id number]  CSN: [id number]    Hospital Day: 2      Antepartum Progress Note    Subjective   She continues to contract painfully.  Does not think she can tolerate going up to a higher dose of magnesium.    Objective     Min/max vitals past 24 hours:   Temp  Min: 97.8 °F (36.6 °C)  Max: 98.3 °F (36.8 °C)  BP  Min: 109/57  Max: 127/72  Pulse  Min: 87  Max: 124  Resp  Min: 16  Max: 18         General: well developed; well nourished  no acute distress   Heart: Not performed.   Lungs: breathing is unlabored   Abdomen: soft, non-tender; no masses  no umbilical or inguinal hernias are present  no hepato-splenomegaly   FHT's: reassuring and category 1 both twin A and twin B   Cervix:  Was recently checked by staff.  She is progressed both in terms of dilation and effacement.  Most recent exam 1-2/80%   Contractions: regular every 3-4 minutes          Assessment   7. DC/DA twins at 31w2d  8.  labor failed tocolysis with magnesium sulfate and oral Procardia  9. Previous  section    Plan   9. Procedure repeat  section  10. Discussed with Janie ketty.  Previously had signed consents.  Given the  gestation it was my recommendation that we delay tubal ligation at this time.  If at 6 weeks postpartum both twins are doing well and she wishes to proceed with laparoscopic sterilization, I think that is a better plan  11. Options of Mirena as a contraceptive option also briefly discussed  12. Antibiotic prophylaxis ordered and magnesium discontinued    Yassine Prince MD  2020  01:36           Electronically signed by Yassine Prince MD at 20 0139       Consult Notes (last 72 hours) (Notes from 20 1132 through 20 1132)    No notes of this type exist for  this encounter.

## 2020-06-09 NOTE — DISCHARGE SUMMARY
Discharge Summary     Jimenez Hollis  : 1990  MRN: [id number]  CSN: [id number]    Date of Admission: 2020   Date of Discharge:  2020   Delivering Physician: Yassine Prince MD       Admission Diagnosis: 1. Dichorionic diamniotic twins at 31w2d  2.  labor     Discharge Diagnosis: 1. Same as above plus  2. Pregnancy at 31w2d - delivered  3.  labor failed tocolyse  4. Postpartum anemia       Procedures: 1. Repeat  (LTCS)     Hospital Course  See the completed operative report for details regarding her delivery.    Her post-operative course was unremarkable.  On POD # 2 she felt like she was ready for D/C.  She was evaluated by Dr. Prince who agreed she was able to be discharged to home.  She had no febrile morbidity. She had normal bowel and bladder function and was hemodynamically stable.  Her wound was healing well without obvious signs of infections.    Infant     Scout Hollis [[id number]]   male     Scout Hollis [[id number]]   male   fetus weighing      Scout Hollis [[id number]]   1650 g (3 lb 10.2 oz)     Scout Hollis [[id number]]   1820 g (4 lb 0.2 oz)    Apgars -       Scout Hollis [[id number]]   8      Scout Hollis [[id number]]   7   @ 1 minute /       Scout Hollis [[id number]]   9      Scout Hollis [[id number]]   8   @ 5 minutes.    Discharge labs  Lab Results   Component Value Date    WBC 11.68 (H) 2020    HGB 7.7 (L) 2020    HCT 26.0 (L) 2020     2020       Discharge Medications     Discharge Medications      New Medications      Instructions Start Date   HYDROcodone-acetaminophen 5-325 MG per tablet  Commonly known as:  NORCO   1-2 tablets, Oral, Every 4 Hours PRN      ibuprofen 200 MG tablet  Commonly known as:  ADVIL,MOTRIN   200-600 mg, Oral, Every 6 Hours Scheduled         Continue These Medications      Instructions Start  Date   docusate sodium 50 MG capsule  Commonly known as:  COLACE   50 mg, Oral, 2 Times Daily      ferrous sulfate 324 (65 Fe) MG tablet delayed-release EC tablet   1 tablet, Oral, 2 Times Daily      prenatal vitamin 27-0.8 27-0.8 MG tablet tablet   Oral, Daily             Discharge Disposition Home or Self Care   Condition on Discharge: good   Follow-up: 2 weeks with Dr. Suresh Prince MD  6/9/2020

## 2020-06-09 NOTE — LACTATION NOTE
"   06/09/20 2066   Maternal Information   Person Making Referral   (fu consult)   Maternal Reason for Referral   (mom is pumping every 3 hours--getting drops)   Infant Reason for Referral NICU admission  (31 2/7 week twins)   Equipment Type   Breast Pump Type double electric, hospital grade;double electric, personal  (using hospital pump; has personal pump )   Breast Pump Flange Type hard   Breast Pump Flange Size 24 mm   Reproductive Interventions   Breastfeeding Assistance support offered   Breastfeeding Support encouragement provided;lactation counseling provided   Breast Pumping   Breast Pumping Interventions early pumping promoted;frequent pumping encouraged   Encouraged mom to continue pumping every 3 hours to get best milk supply possible. Gave Pump for [employer] contract and discussed terms of contract, including that this pump needs to be returned a few days before last baby goes home from NICU. Gave information about online videos by [employer], \"Maximizing Milk Production\" and \"Hand Expression\" that demonstrate how to use hands to help with milk expression. Instructed on sterilizing pump parts every 24 hours while babies are in NICU. To call for lactation services, if there are questions or concerns or if mom wants help with breastfeeding in NICU.   "

## 2020-06-10 NOTE — PAYOR COMM NOTE
"Fuad Hollis (29 y.o. Female)   Auth#[id number]  Discharge Date     Date of Birth Social Security Number Address Home Phone MRN    1990  [address]  [place] [address] [phone] [id number]    Denominational Marital Status          None        Admission Date Admission Type Admitting Provider Attending Provider Department, Room/Bed    20 Elective Yassine Prince MD  [facility] MOTHER BABY 4B, N427/1    Discharge Date Discharge Disposition Discharge Destination        2020 Home or Self Care              Attending Provider:  (none)   Allergies:  Oxycodone Hcl, Codeine    Isolation:  None   Infection:  None   Code Status:  Prior    Ht:  152.4 cm (60\")   Wt:  58.9 kg (129 lb 12.8 oz)    Admission Cmt:  None   Principal Problem:  Postpartum care following rLTCS (1 layer) 2020 - Rocky Huitron [Z39.2]                 Active Insurance as of 2020     Primary Coverage     Payor Plan Insurance Group Employer/Plan Group    [employer] [place]      Payor Plan Address Payor Plan Phone Number Payor Plan Fax Number Effective Dates    [address] [address] [phone]  2020 - None Entered    [facility] [address]       Subscriber Name Subscriber Birth Date Member ID       FUAD HOLLIS 1990 [id number]                 Emergency Contacts      (Rel.) Home Phone Work Phone Mobile Phone    ALISHA HOLLIS (Spouse) [phone] -- --                 Discharge Summary      Yassine Prince MD at 20 1134          Discharge Summary     Jimenez Hollis  : 1990  MRN: [id number]  CSN: [id number]    Date of Admission: 2020   Date of Discharge:  2020   Delivering Physician: Yassine Prince MD       Admission Diagnosis: 1. Dichorionic diamniotic twins at 31w2d  2.  labor     Discharge Diagnosis: 1. Same as above plus  2. Pregnancy at 31w2d - delivered  3.  labor failed tocolyse  4. Postpartum " anemia       Procedures: 1. Repeat  ([facility])     Hospital Course  See the completed operative report for details regarding her delivery.    Her post-operative course was unremarkable.  On POD # 2 she felt like she was ready for D/C.  She was evaluated by Dr. Prince who agreed she was able to be discharged to home.  She had no febrile morbidity. She had normal bowel and bladder function and was hemodynamically stable.  Her wound was healing well without obvious signs of infections.    Infant     Scout Hollis [[id number]]   male     Scout Hollis [[id number]]   male   fetus weighing      Scout Hollis [[id number]]   1650 g (3 lb 10.2 oz)     Scout Hollis [[id number]]   1820 g (4 lb 0.2 oz)    Apgars -       Scout Hollis [[id number]]   8      Scout Hollis [[id number]]   7   @ 1 minute /       Scout Hollis [[id number]]   9      Scout Hollis [[id number]]   8   @ 5 minutes.    Discharge labs  Lab Results   Component Value Date    WBC 11.68 (H) 2020    HGB 7.7 (L) 2020    HCT 26.0 (L) 2020     2020       Discharge Medications     Discharge Medications      New Medications      Instructions Start Date   HYDROcodone-acetaminophen 5-325 MG per tablet  Commonly known as:  NORCO   1-2 tablets, Oral, Every 4 Hours PRN      ibuprofen 200 MG tablet  Commonly known as:  ADVIL,MOTRIN   200-600 mg, Oral, Every 6 Hours Scheduled         Continue These Medications      Instructions Start Date   docusate sodium 50 MG capsule  Commonly known as:  COLACE   50 mg, Oral, 2 Times Daily      ferrous sulfate 324 (65 Fe) MG tablet delayed-release EC tablet   1 tablet, Oral, 2 Times Daily      prenatal vitamin 27-0.8 27-0.8 MG tablet tablet   Oral, Daily             Discharge Disposition Home or Self Care   Condition on Discharge: good   Follow-up: 2 weeks with Dr. Suresh Prince,  MD  6/9/2020    Electronically signed by Yassine Prince MD at 06/09/20 4650

## 2020-06-16 ENCOUNTER — TELEPHONE (OUTPATIENT)
Dept: OBSTETRICS AND GYNECOLOGY | Facility: CLINIC | Age: 30
End: 2020-06-16

## 2020-06-16 RX ORDER — HYDROCODONE BITARTRATE AND ACETAMINOPHEN 5; 325 MG/1; MG/1
1-2 TABLET ORAL EVERY 4 HOURS PRN
Qty: 20 TABLET | Refills: 0 | Status: SHIPPED | OUTPATIENT
Start: 2020-06-16 | End: 2020-06-22 | Stop reason: SDUPTHER

## 2020-06-16 NOTE — TELEPHONE ENCOUNTER
It is done      New Medications Ordered This Visit   Medications   • HYDROcodone-acetaminophen (NORCO) 5-325 MG per tablet     Sig: Take 1-2 tablets by mouth Every 4 (Four) Hours As Needed for Moderate Pain .     Dispense:  20 tablet     Refill:  0

## 2020-06-16 NOTE — TELEPHONE ENCOUNTER
Pt called and stated could you please call in something that is  stronger than the motrin, due to the fact she in a lot of pain from the car rides, back and fourth from visiting babies

## 2020-06-17 ENCOUNTER — TELEPHONE (OUTPATIENT)
Dept: OBSTETRICS AND GYNECOLOGY | Facility: CLINIC | Age: 30
End: 2020-06-17

## 2020-06-17 NOTE — TELEPHONE ENCOUNTER
Called and spoke to Janie.  She is had several clots intermittently.  Had some last evening but is not bleeding during the day.  At this point I think he can be watched and observed.  If it becomes a bigger issue then I asked her to come to be seen

## 2020-06-17 NOTE — TELEPHONE ENCOUNTER
Suresh pt called stating she had a c/s on 6/7 and is c/o no bleeding during the day but at night she has bleeding with clots the size of golf balls. Please contact back

## 2020-06-22 ENCOUNTER — POSTPARTUM VISIT (OUTPATIENT)
Dept: OBSTETRICS AND GYNECOLOGY | Facility: CLINIC | Age: 30
End: 2020-06-22

## 2020-06-22 VITALS
DIASTOLIC BLOOD PRESSURE: 70 MMHG | SYSTOLIC BLOOD PRESSURE: 116 MMHG | BODY MASS INDEX: 20.31 KG/M2 | RESPIRATION RATE: 14 BRPM | WEIGHT: 104 LBS

## 2020-06-22 DIAGNOSIS — Z98.890 POST-OPERATIVE STATE: Primary | ICD-10-CM

## 2020-06-22 PROCEDURE — 99024 POSTOP FOLLOW-UP VISIT: CPT | Performed by: OBSTETRICS & GYNECOLOGY

## 2020-06-22 RX ORDER — HYDROCODONE BITARTRATE AND ACETAMINOPHEN 5; 325 MG/1; MG/1
1-2 TABLET ORAL EVERY 4 HOURS PRN
Qty: 10 TABLET | Refills: 0 | Status: SHIPPED | OUTPATIENT
Start: 2020-06-22 | End: 2020-07-22

## 2020-07-22 ENCOUNTER — POSTPARTUM VISIT (OUTPATIENT)
Dept: OBSTETRICS AND GYNECOLOGY | Facility: CLINIC | Age: 30
End: 2020-07-22

## 2020-07-22 VITALS — DIASTOLIC BLOOD PRESSURE: 56 MMHG | WEIGHT: 101.2 LBS | BODY MASS INDEX: 19.76 KG/M2 | SYSTOLIC BLOOD PRESSURE: 102 MMHG

## 2020-07-22 PROCEDURE — 0503F POSTPARTUM CARE VISIT: CPT | Performed by: OBSTETRICS & GYNECOLOGY

## 2020-07-22 NOTE — PROGRESS NOTES
Subjective   Chief Complaint   Patient presents with   • Postpartum Care     6 wks PP C/S for twins, breast feeding     Janie Hollis is a 29 y.o. year old  presenting to be seen for her postpartum visit.  She had a  (LTCS).  Her sons are doing well.    Since delivery she has not been sexually active.  She does not have concerns about post-partum blues/depression.   She is breast feeding and plans to continues for 1 year(s).  For ongoing contraception, her plans are tubal ligation.    The following portions of the patient's history were reviewed and updated as appropriate:current medications and allergies    Social History    Tobacco Use      Smoking status: Never Smoker      Smokeless tobacco: Never Used      Review of Systems  Constitutional POS: nothing reported    NEG: anorexia or night sweats   Genitourinary POS: nothing reported    NEG: dysuria or hematuria      Gastointestinal POS: nothing reported    NEG: bloating, change in bowel habits, melena or reflux symptoms   Breast POS: nothing reported    NEG: persistent breast lump, skin dimpling or nipple discharge        Objective   /56   Wt 45.9 kg (101 lb 3.2 oz)   LMP 2019 Comment: pt states that she had a period last week and then started again 2 days ago.  Breastfeeding Yes   BMI 19.76 kg/m²     General:  well developed; well nourished  no acute distress   Abdomen: soft, non-tender; no masses  no umbilical or inguinal hernias are present  no hepato-splenomegaly  incision is clean, dry, intact and without drainage   Pelvis: Clinical staff was present for exam  External genitalia:  normal appearance of the external genitalia including Bartholin's and [place]'s glands.  :  urethral meatus normal;  Vaginal:  normal pink mucosa without prolapse or lesions.  Cervix:  normal appearance.  Uterus:  normal size, shape and consistency.  Adnexa:  normal bimanual exam of the adnexa.  Rectal:  digital rectal exam not performed; anus  visually normal appearing.          Assessment   1. Normal 6 week postpartum exam S/P  (LTCS)     Plan   1. BC options reviewed and compared today: IUD - Mirena, tubal ligation and vasectomy  2. The importance of keeping all planned follow-up and taking all medications as prescribed was emphasized.  3. Follow up for annual exam 4 months          This note was electronically signed.    Yassine Prince M.D.  2020    Note: Speech recognition transcription software may have been used to create portions of this document.  An attempt at proofreading has been made but errors in transcription could still be present.

## 2020-07-29 ENCOUNTER — OFFICE VISIT (OUTPATIENT)
Dept: OBSTETRICS AND GYNECOLOGY | Facility: CLINIC | Age: 30
End: 2020-07-29

## 2020-07-29 VITALS — BODY MASS INDEX: 19.73 KG/M2 | RESPIRATION RATE: 14 BRPM | WEIGHT: 101 LBS

## 2020-07-29 DIAGNOSIS — Z30.014 ENCOUNTER FOR INITIAL PRESCRIPTION OF INTRAUTERINE CONTRACEPTIVE DEVICE (IUD): Primary | ICD-10-CM

## 2020-07-29 PROCEDURE — 58300 INSERT INTRAUTERINE DEVICE: CPT | Performed by: OBSTETRICS & GYNECOLOGY

## 2020-07-29 NOTE — PROGRESS NOTES
IUD Insertion    No LMP recorded.    Date of procedure:  7/29/2020    Risks and benefits discussed? yes  All questions answered? yes  Consents given by The patient  Written consent obtained? yes    Local anesthesia used:  no    Procedure documentation:    After verifying the patient had a low probability of being pregnant and met the criteria for insertion, a sterile speculum has placed and the cervix was cleansed with an antiseptic solution.  Vaginal discharge was scant.  The anterior lip of the cervix was grasped with a tenaculum and the uterine cavity was gently sounded. There was mild difficulty passing the sound through the cervix.  Cervical dilation did not need to be performed prior to placing the IUD.  The uterus was retroverted and sounded to 7 cms.  The Mirena was then prepared per the manufacturers instructions.    The Mirena was advanced to a point 2 cms from the fundus and then the arms were released from the sheath.  The device was advanced to the fundus and the device was released fully from the sheath. The string was cut 2 cms in length.  Bleeding from the cervix was scant.    She tolerated the procedure without any difficulty.     Post procedure instructions: It was reviewed that the Mirena will not alter the timing of when she bleeds but it may decrease the quantity of flow and cramps.  Roughly 30% of people will be amenorrheic over time.  Efficacy rate of 99.2% over 5 years was discussed.  Spontaneous expulsion rate of 1-2% was also discussed.  If she has any issue with fever or excessive bleeding or pain she is to call the office immediately.  Otherwise I would like to see her back in 6 weeks with an ultrasound to confirm correct placement.    This note was electronically signed.    Yassine Prince M.D.  July 29, 2020

## 2020-09-17 ENCOUNTER — OFFICE VISIT (OUTPATIENT)
Dept: OBSTETRICS AND GYNECOLOGY | Facility: CLINIC | Age: 30
End: 2020-09-17

## 2020-09-17 VITALS — WEIGHT: 97 LBS | BODY MASS INDEX: 18.94 KG/M2 | RESPIRATION RATE: 14 BRPM

## 2020-09-17 DIAGNOSIS — Z30.431 CHECKING OF INTRAUTERINE DEVICE: Primary | ICD-10-CM

## 2020-09-17 DIAGNOSIS — Z30.014 ENCOUNTER FOR INITIAL PRESCRIPTION OF INTRAUTERINE CONTRACEPTIVE DEVICE (IUD): ICD-10-CM

## 2020-09-17 PROCEDURE — 58301 REMOVE INTRAUTERINE DEVICE: CPT | Performed by: OBSTETRICS & GYNECOLOGY

## 2020-09-17 PROCEDURE — 58300 INSERT INTRAUTERINE DEVICE: CPT | Performed by: OBSTETRICS & GYNECOLOGY

## 2020-09-17 PROCEDURE — 99213 OFFICE O/P EST LOW 20 MIN: CPT | Performed by: OBSTETRICS & GYNECOLOGY

## 2020-09-17 NOTE — PROGRESS NOTES
IUD Removal and Immediate Reinsertion    Patient's last menstrual period was 08/27/2020 (approximate).    Date of procedure:  9/17/2020    Risks and benefits discussed? yes  All questions answered? yes  Consents given by the patient  Written consent obtained? yes  Reason for removal: IUD in NAS    Local anesthesia used:  no    Procedure documentation:    A speculum was placed in order to view the cervix.  A tenaculum did not need to be placed on the anterior cervical lip.  Cervical dilation did not need to be performed in order to access the string.  The IUD string was easily seen.  The string was grasped and the IUD was removed without difficulty.  The IUD did not appear to be adherent to the uterine cavity. It was removed intact.    A sterile speculum was replaced and the cervix was cleansed with an antiseptic solution.  Vaginal discharge was scant.  The posterior lip of the cervix was grasped with a tenaculum and the uterine cavity was gently sounded.  There was mild difficulty passing the sound through the cervix.  Cervical dilation did not need to be performed prior to placing the IUD.  The uterus was retroverted and sounded to 8 cms.  The Mirena was then prepared per the manufacturers instructions.    The Mirena was advanced to a point 2 cms from the fundus and then the arms were released from the sheath.  The device was advanced to the fundus and the device was released fully from the sheath.. The string was cut 2 cms in length.  Bleeding from the cervix was scant.    She tolerated the procedure without any difficulty.     Post procedure instructions: It was reviewed that the Mirena will not alter the timing of when she bleeds but it may decrease the quantity of flow and cramps.  Roughly 30% of people will be amenorrheic over time.  Efficacy rate of 99.2% over 5 years was discussed.  Spontaneous expulsion rate of 1-2% was also discussed.  If she has any issue with fever or excessive bleeding or pain she is to  call the office immediately.  Otherwise I would like to see her back in 6 weeks with an ultrasound to confirm correct placement.    This note was electronically signed.    Yassine Prince M.D.  September 17, 2020

## 2020-09-17 NOTE — PROGRESS NOTES
Subjective   Chief Complaint   Patient presents with   • Follow-up     Janie Hollis is a 30 y.o. year old  presenting to be seen for follow-up of her recently placed Mirena.  Since the time of insertion she has been sexually active.  [place] has not been painful for her.   [place] has not been painful for her partner.    OTHER THINGS SHE WANTS TO DISCUSS TODAY:  Nothing else       Objective   Resp 14   Wt 44 kg (97 lb)   LMP 2020 (Approximate)   Breastfeeding Yes   BMI 18.94 kg/m²     Imaging   Pelvic ultrasound images independantly reviewed - Uterus is acutely retroflexed.  The IUD is seen in the lower uterine segment       Assessment   1. Suboptimal placement of IUD     Plan   1. IUD needs removal  2. Ongoing birth control options discussed including replacement of IUD or start her on alternative contraception.  She would like to try to have another IUD placed  3. The importance of keeping all planned follow-up and taking all medications as prescribed was emphasized.  4. Follow up for IUD replacement today         Total time spent today with Janie  was 20 minutes (level 3).  Greater than 50% of the time was spent coordinating care, answering her questions and counseling regarding pathophysiology of her presenting problem along with plans for any diagnositc work-up and treatment.    This note was electronically signed.    Yassine Prince M.D.  2020    Note: Speech recognition transcription software may have been used to create portions of this document.  An attempt at proofreading has been made but errors in transcription could still be present.

## 2020-10-06 ENCOUNTER — HOSPITAL ENCOUNTER (EMERGENCY)
Facility: HOSPITAL | Age: 30
Discharge: HOME | End: 2020-10-06
Attending: EMERGENCY MEDICINE | Admitting: EMERGENCY MEDICINE

## 2020-10-06 VITALS
OXYGEN SATURATION: 98 % | HEART RATE: 86 BPM | SYSTOLIC BLOOD PRESSURE: 124 MMHG | BODY MASS INDEX: 19.63 KG/M2 | RESPIRATION RATE: 20 BRPM | WEIGHT: 100 LBS | HEIGHT: 60 IN | TEMPERATURE: 98.1 F | DIASTOLIC BLOOD PRESSURE: 87 MMHG

## 2020-10-06 DIAGNOSIS — K02.9 TOOTH CARIES: Primary | ICD-10-CM

## 2020-10-06 PROCEDURE — 99283 EMERGENCY DEPT VISIT LOW MDM: CPT

## 2020-10-06 PROCEDURE — 25010000002 CEFTRIAXONE PER 250 MG: Performed by: PHYSICIAN ASSISTANT

## 2020-10-06 PROCEDURE — 96372 THER/PROPH/DIAG INJ SC/IM: CPT

## 2020-10-06 RX ORDER — IBUPROFEN 600 MG/1
600 TABLET ORAL EVERY 8 HOURS PRN
Qty: 20 TABLET | Refills: 0 | Status: SHIPPED | OUTPATIENT
Start: 2020-10-06 | End: 2021-01-21

## 2020-10-06 RX ORDER — HYDROCODONE BITARTRATE AND ACETAMINOPHEN 10; 325 MG/1; MG/1
1 TABLET ORAL EVERY 6 HOURS PRN
Qty: 10 TABLET | Refills: 0 | Status: SHIPPED | OUTPATIENT
Start: 2020-10-06 | End: 2021-01-21

## 2020-10-06 RX ORDER — FLUCONAZOLE 150 MG/1
150 TABLET ORAL ONCE
Qty: 1 TABLET | Refills: 0 | Status: SHIPPED | OUTPATIENT
Start: 2020-10-06 | End: 2020-10-06

## 2020-10-06 RX ORDER — AMOXICILLIN AND CLAVULANATE POTASSIUM 875; 125 MG/1; MG/1
1 TABLET, FILM COATED ORAL 2 TIMES DAILY
Qty: 20 TABLET | Refills: 0 | Status: SHIPPED | OUTPATIENT
Start: 2020-10-06 | End: 2021-01-21

## 2020-10-06 RX ADMIN — LIDOCAINE HYDROCHLORIDE 1 G: 10 INJECTION, SOLUTION EPIDURAL; INFILTRATION; INTRACAUDAL; PERINEURAL at 14:23

## 2020-10-06 RX ADMIN — BENZOCAINE: 200 LIQUID DENTAL; ORAL; PERIODONTAL at 14:29

## 2020-10-06 NOTE — ED PROVIDER NOTES
Subjective   30-year-old white female presents secondary to right lower dental pain.  Patient states it started a few days ago when her teeth chipped.  She states that she had used some over-the-counter dental product as glue.  She states that she subsequently started swelling in her right sided chin last evening into this morning.  No fever.  No difficulty swallowing.  No other complaints at this time.  Patient has an appointment with [facility] tomorrow          Review of Systems   Constitutional: Negative.  Negative for fever.   HENT: Positive for dental problem.    Respiratory: Negative.    Cardiovascular: Negative.  Negative for chest pain.   Gastrointestinal: Negative.  Negative for abdominal pain.   Endocrine: Negative.    Genitourinary: Negative.  Negative for dysuria.   Skin: Negative.    Neurological: Negative.    Psychiatric/Behavioral: Negative.    All other systems reviewed and are negative.      Past Medical History:   Diagnosis Date   • Anxiety    • Endometriosis 2016    Status post multiple laparoscopies   • Oral thrush     hospitalized x 2 weeks       Allergies   Allergen Reactions   • Acetaminophen Other (See Comments)     Tachycardia   • Oxycodone Hcl Swelling   • Codeine Other (See Comments)     Increased Heart Rate       Past Surgical History:   Procedure Laterality Date   •  SECTION N/A 2020    Procedure: Repeat LTCS (1 layer closure);  Surgeon: Yassine Prince MD;  Location: [facility] LABOR DELIVERY;  Service: Obstetrics/Gynecology;  Laterality: N/A;   •  SECTION PRIMARY  2013    LTCS (2 layer)   • D&C AND LAPAROSCOPY N/A 2018    [facility]; Dr. Mora; endometrial pathology benign; laparoscopic findings consistent with endometriosis   • DIAGNOSTIC LAPAROSCOPY      [facility]; Dr. Mora; findings consistent with endometriosis   • DIAGNOSTIC LAPAROSCOPY N/A 2016    [facility]; Dr. Mora; diagnostic laparoscopy  with fulguration of endometriosis   • DIAGNOSTIC LAPAROSCOPY N/A 7/2/2019    [facility]; Dr. Mora; findings consistent with endometriosis   • ENDOSCOPY  2016    Normal   • GANGLION CYST EXCISION Right 2014   • SKIN BIOPSY  2017    on abdomen - all benign       Family History   Problem Relation Age of Onset   • Diabetes Sister    • Heart disease Maternal Grandmother    • Hypertension Maternal Grandmother        Social History     Socioeconomic History   • Marital status:      Spouse name: Not on file   • Number of children: Not on file   • Years of education: Not on file   • Highest education level: Not on file   Tobacco Use   • Smoking status: Never Smoker   • Smokeless tobacco: Never Used   Substance and Sexual Activity   • Alcohol use: No   • Drug use: No   • Sexual activity: Yes     Partners: Male     Birth control/protection: None           Objective   Physical Exam  Vitals signs and nursing note reviewed.   Constitutional:       General: She is not in acute distress.     Appearance: She is well-developed. She is not diaphoretic.   HENT:      Head: Normocephalic and atraumatic.      Comments: Patient is swelling to the right chin.  No redness.  She does have dental caries with erosion and fracture.     Right Ear: External ear normal.      Left Ear: External ear normal.      Nose: Nose normal.   Eyes:      Conjunctiva/sclera: Conjunctivae normal.      Pupils: Pupils are equal, round, and reactive to light.   Neck:      Musculoskeletal: Normal range of motion and neck supple.      Vascular: No JVD.      Trachea: No tracheal deviation.   Cardiovascular:      Rate and Rhythm: Normal rate and regular rhythm.      Heart sounds: Normal heart sounds. No murmur.   Pulmonary:      Effort: Pulmonary effort is normal. No respiratory distress.      Breath sounds: Normal breath sounds. No wheezing.   Abdominal:      General: Bowel sounds are normal.      Palpations: Abdomen is soft.      Tenderness: There  is no abdominal tenderness.   Musculoskeletal: Normal range of motion.         General: No deformity.   Skin:     General: Skin is warm and dry.      Coloration: Skin is not pale.      Findings: No erythema or rash.   Neurological:      Mental Status: She is alert and oriented to person, place, and time.      Cranial Nerves: No cranial nerve deficit.   Psychiatric:         Behavior: Behavior normal.         Thought Content: Thought content normal.         Procedures           ED Course                                           MDM  Number of Diagnoses or Management Options  Tooth caries: established and worsening  Risk of Complications, Morbidity, and/or Mortality  Presenting problems: low        Final diagnoses:   Tooth caries            Eladio Kelsey PA  10/06/20 1506

## 2020-10-06 NOTE — ED NOTES
Pt has no difficulty with speech, swallowing or breathing airway intact      Concepcion Michele, RN  10/06/20 4313

## 2020-10-06 NOTE — ED NOTES
Pt d/c all questions answered no acute distress noted skin warm pink and dry      Concepcion Michele, RN  10/06/20 2714

## 2020-11-23 ENCOUNTER — OFFICE VISIT (OUTPATIENT)
Dept: OBSTETRICS AND GYNECOLOGY | Facility: CLINIC | Age: 30
End: 2020-11-23

## 2020-11-23 VITALS — WEIGHT: 91 LBS | BODY MASS INDEX: 17.77 KG/M2 | RESPIRATION RATE: 14 BRPM

## 2020-11-23 DIAGNOSIS — Z30.431 CHECKING OF INTRAUTERINE DEVICE: Primary | ICD-10-CM

## 2020-11-23 PROCEDURE — 99213 OFFICE O/P EST LOW 20 MIN: CPT | Performed by: OBSTETRICS & GYNECOLOGY

## 2020-11-23 NOTE — PROGRESS NOTES
Subjective   Chief Complaint   Patient presents with   • Follow-up     Janie Hollis is a 30 y.o. year old  presenting to be seen for follow-up of her recently placed Mirena.  Since the time of insertion she has been sexually active.  [place] has not been painful for her.   [place] has not been painful for her partner.    OTHER THINGS SHE WANTS TO DISCUSS TODAY:  Nothing else       Objective   Resp 14   Wt 41.3 kg (91 lb)   LMP  (LMP Unknown)   Breastfeeding Yes   BMI 17.77 kg/m²     Imaging   Pelvic ultrasound images independantly reviewed - Normal placement       Assessment   1. Normal position of endometrial IUD     Plan   1. The importance of keeping all planned follow-up and taking all medications as prescribed was emphasized.  2. Follow up for annual exam 2021         Total time spent today with Janie  was 20 minutes (level 3).  Greater than 50% of the time was spent coordinating care, answering her questions and counseling regarding pathophysiology of her presenting problem along with plans for any diagnositc work-up and treatment.    This note was electronically signed.    Yassine Prince M.D.  2020    Note: Speech recognition transcription software may have been used to create portions of this document.  An attempt at proofreading has been made but errors in transcription could still be present.

## 2021-01-21 ENCOUNTER — OFFICE VISIT (OUTPATIENT)
Dept: OBSTETRICS AND GYNECOLOGY | Facility: CLINIC | Age: 31
End: 2021-01-21

## 2021-01-21 VITALS
RESPIRATION RATE: 14 BRPM | SYSTOLIC BLOOD PRESSURE: 104 MMHG | WEIGHT: 90 LBS | BODY MASS INDEX: 17.58 KG/M2 | DIASTOLIC BLOOD PRESSURE: 62 MMHG

## 2021-01-21 DIAGNOSIS — Z01.419 WELL WOMAN EXAM: Primary | ICD-10-CM

## 2021-01-21 DIAGNOSIS — Z71.85 VACCINE COUNSELING: ICD-10-CM

## 2021-01-21 PROCEDURE — 99395 PREV VISIT EST AGE 18-39: CPT | Performed by: OBSTETRICS & GYNECOLOGY

## 2021-01-21 RX ORDER — PRENATAL VIT/IRON FUM/FOLIC AC 27MG-0.8MG
TABLET ORAL DAILY
COMMUNITY
End: 2022-05-09

## 2021-01-21 NOTE — PROGRESS NOTES
Subjective   Chief Complaint   Patient presents with   • Gynecologic Exam     Janie Hollis is a 30 y.o. year old  presenting to be seen for her annual exam.     SEXUAL Hx:  She is currently sexually active.  In the past year there there has been NO new sexual partners.    Condoms are never used.  She would not like to be screened for STD's at today's exam.  Current birth control method: IUD - Mirena.  She is happy with her current method of contraception and does not want to discuss alternative methods of contraception.  MENSTRUAL Hx:  No LMP recorded. Patient has had an implant.  In the past 6 months her cycles have been absent.  Her menstrual flow has been absent.   Each month on average there are roughly 0 day(s) of very heavy flow.  Intermenstrual bleeding is absent.    Post-coital bleeding is absent.  Dysmenorrhea: is not affecting her activities of daily living  PMS: is not affecting her activities of daily living  Her cycles are not a source of concern for her that she wishes to discuss today.  HEALTH Hx:  She exercises regularly: no (and has no plans to become more active).  She wears her seat belt: yes.  She has concerns about domestic violence: no.  OTHER THINGS SHE WANTS TO DISCUSS TODAY:  Nothing else    The following portions of the patient's history were reviewed and updated as appropriate:problem list, current medications, allergies, past family history, past medical history, past social history and past surgical history.    Social History    Tobacco Use      Smoking status: Never Smoker      Smokeless tobacco: Never Used    Review of Systems  Constitutional POS: nothing reported    NEG: anorexia or night sweats   Genitourinary POS: nothing reported    NEG: dysuria or hematuria      Gastointestinal POS: nothing reported    NEG: bloating, change in bowel habits, melena or reflux symptoms   Integument POS: nothing reported    NEG: moles that are changing in size, shape, color or rashes    Breast POS: nothing reported    NEG: persistent breast lump, skin dimpling or nipple discharge        Objective   /62   Resp 14   Wt 40.8 kg (90 lb)   Breastfeeding Yes   BMI 17.58 kg/m²     General:  well developed; well nourished  no acute distress   Skin:  No suspicious lesions seen   Thyroid: normal to inspection and palpation   Breasts:  Examined in supine position  Symmetric without masses or skin dimpling  Nipples normal without inversion, lesions or discharge  There are no palpable axillary nodes   Abdomen: soft, non-tender; no masses  no umbilical or inguinal hernias are present  no hepato-splenomegaly   Pelvis: Clinical staff was present for exam  External genitalia:  normal appearance of the external genitalia including Bartholin's and Lometa's glands.  :  urethral meatus normal;  Vaginal:  normal pink mucosa without prolapse or lesions.  Cervix:  normal appearance. IUD string present - 1 cms in length;  Uterus:  normal size, shape and consistency. retroverted;  Adnexa:  normal bimanual exam of the adnexa.  Rectal:  digital rectal exam not performed; anus visually normal appearing.        Assessment   1. Normal GYN exam  2. Amenorrhea - most consistent with hormonal IUD use  3. She is up to date on all relevant gynecologic and colorectal screenings     Plan   1. Pap was done today.  If she does not receive the results of the Pap within 2 weeks  time, she was instructed to call to find out the results.  I explained to Janie that the recommendations for Pap smear interval in a low risk patient's has lengthened to 3 years time.  I encouraged her to be seen yearly for a full physical exam including breast and pelvic exam even during the off years when PAP's will not be performed.  2. I discussed with Janie that she may be behind on needed vaccinations for Influenza and TDAP.  She may be able to obtain these vaccinations at her local pharmacy OR speak about obtaining them with her primary  care.  If she does obtain her vaccines, I have asked Janie to let us know the date each vaccine was obtained so that her medical record could be updated in our system.  3. Today I discussed with Janie the total recommended calcium intake for a premenopausal female is 1000 mg.  Ideally this should be from dietary sources.  I reviewed calcium content in various foods including milk, fortified orange juice and yogurt.  If she cannot get sufficient calcium through dietary means, it is recommended to supplement with either a multivitamin or calcium to reach her daily goal.  I also reviewed the difference in the bioavailability of calcium carbonate and calcium citrate containing supplements and the importance of taking calcium carbonate containing products with food.  Finally, vitamin D's role in calcium absorption was reviewed and a total daily vitamin D intake of 800 units was recommended.  4. The importance of keeping all planned follow-up and taking all medications as prescribed was emphasized.  5. Follow up for annual exam 1 year         This note was electronically signed.    Yassine Prince M.D.  January 21, 2021    Note: Speech recognition transcription software may have been used to create portions of this document.  An attempt at proofreading has been made but errors in transcription could still be present.

## 2021-01-21 NOTE — PATIENT INSTRUCTIONS
Calcium and Vitamin D Information for Bone Health        Food   Amount Calcium (mgs.) Fat (gms.)   Milk and Dairy Products      Low-fat plain yogurt 1 cup 415 3.4   Skim Milk 1 cup 303 0.4   Low-fat milk 1 cup 298 4.7   Swiss cheese 1 oz. 219 7.1   Cheddar cheese 1 oz. 211 9.1   American Cheese 1 oz. 195 8.4   Ice cream (hard) 1 cup 176 14.1         Nuts      Sesame seeds, dried 3 ½  ozs. 100 53.4   Almonds 1 oz. 66 16.2         Seafood      Scallops, steamed 3 ½  ozs. 115 1.4   Shrimp, raw 3 ½  ozs. 63 0.8         Green Leafy vegetables      Kale, cooked without stem ¾ cup 187 0.7   Turnip greens, cooked ½ cup 184 0.2   Broccoli, cooked 2/3 cup 88 0.3   Spinach, cooked ½ cup 83 0.3         Other foods      Pudding, chocolate ½ cup 147 6.6   Slice from 12 inch cheese pizza 1 piece 144 4.4   Cream of celery soup made with milk 1 serving 135 33.0   Figs, dried 5 medium 126 1.3   Raisins, dried, seedless 5/8 cup 62 0.2   Chili con carne with beans 5 oz. 61 9.9                         Tdap Vaccine (Tetanus, Diphtheria and Pertussis): What You Need to Know      1. Why get vaccinated?  Tetanus, diphtheria and pertussis are very serious diseases. Tdap vaccine can protect us from these diseases. And, Tdap vaccine given to pregnant women can protect  babies against pertussis..  TETANUS (Lockjaw) is rare in the [place] today. It causes painful muscle tightening and stiffness, usually all over the body.  · It can lead to tightening of muscles in the head and neck so you can't open your mouth, swallow, or sometimes even breathe. Tetanus kills about 1 out of 10 people who are infected even after receiving the best medical care.  DIPHTHERIA is also rare in the [place] today. It can cause a thick coating to form in the back of the throat.  · It can lead to breathing problems, heart failure, paralysis, and death.  PERTUSSIS (Whooping Cough) causes severe coughing spells, which can cause difficulty breathing,  vomiting and disturbed sleep.  · It can also lead to weight loss, incontinence, and rib fractures. Up to 2 in 100 adolescents and 5 in 100 adults with pertussis are hospitalized or have complications, which could include pneumonia or death.    These diseases are caused by bacteria. Diphtheria and pertussis are spread from person to person through secretions from coughing or sneezing. Tetanus enters the body through cuts, scratches, or wounds.  Before vaccines, as many as 200,000 cases of diphtheria, 200,000 cases of pertussis, and hundreds of cases of tetanus, were reported in the [place] each year. Since vaccination began, reports of cases for tetanus and diphtheria have dropped by about 99% and for pertussis by about 80%.    2. Tdap vaccine  • Tdap vaccine can protect adolescents and adults from tetanus, diphtheria, and pertussis. One dose of Tdap is routinely given at age 11 or 12. People who did not get Tdap at that age should get it as soon as possible.  • Tdap is especially important for healthcare professionals and anyone having close contact with a baby younger than 12 months.  • Pregnant women should get a dose of Tdap during every pregnancy, to protect the  from pertussis. Infants are most at risk for severe, life-threatening complications from pertussis.  • Another vaccine, called Td, protects against tetanus and diphtheria, but not pertussis. A Td booster should be given every 10 years. Tdap may be given as one of these boosters if you have never gotten Tdap before. Tdap may also be given after a severe cut or burn to prevent tetanus infection.  • Your doctor or the person giving you the vaccine can give you more information.  • Tdap may safely be given at the same time as other vaccines.    3. Some people should not get this vaccine  · A person who has ever had a life-threatening allergic reaction after a previous dose of any diphtheria, tetanus or pertussis containing vaccine, OR has a  severe allergy to any part of this vaccine, should not get Tdap vaccine. Tell the person giving the vaccine about any severe allergies.  · Anyone who had coma or long repeated seizures within 7 days after a childhood dose of DTP or DTaP, or a previous dose of Tdap, should not get Tdap, unless a cause other than the vaccine was found. They can still get Td.  · Talk to your doctor if you:  ? have seizures or another nervous system problem,  ? had severe pain or swelling after any vaccine containing diphtheria, tetanus or pertussis,  ? ever had a condition called Guillain-Barré Syndrome (GBS),  ? aren't feeling well on the day the shot is scheduled.    4. Risks  With any medicine, including vaccines, there is a chance of side effects. These are usually mild and go away on their own. Serious reactions are also possible but are rare.  Most people who get Tdap vaccine do not have any problems with it.  Mild problems following Tdap  (Did not interfere with activities)  · Pain where the shot was given (about 3 in 4 adolescents or 2 in 3 adults)  · Redness or swelling where the shot was given (about 1 person in 5)  · Mild fever of at least 100.4°F (up to about 1 in 25 adolescents or 1 in 100 adults)  · Headache (about 3 or 4 people in 10)  · Tiredness (about 1 person in 3 or 4)  · Nausea, vomiting, diarrhea, stomach ache (up to 1 in 4 adolescents or 1 in 10 adults)  · Chills, sore joints (about 1 person in 10)  · Body aches (about 1 person in 3 or 4)  · Rash, swollen glands (uncommon)  Moderate problems following Tdap  (Interfered with activities, but did not require medical attention)  · Pain where the shot was given (up to 1 in 5 or 6)  · Redness or swelling where the shot was given (up to about 1 in 16 adolescents or 1 in 12 adults)  · Fever over 102°F (about 1 in 100 adolescents or 1 in 250 adults)  · Headache (about 1 in 7 adolescents or 1 in 10 adults)  · Nausea, vomiting, diarrhea, stomach ache (up to 1 or 3 people in  100)  · Swelling of the entire arm where the shot was given (up to about 1 in 500).  Severe problems following Tdap  (Unable to perform usual activities; required medical attention)  · Swelling, severe pain, bleeding and redness in the arm where the shot was given (rare).  Problems that could happen after any vaccine:  · People sometimes faint after a medical procedure, including vaccination. Sitting or lying down for about 15 minutes can help prevent fainting, and injuries caused by a fall. Tell your doctor if you feel dizzy, or have vision changes or ringing in the ears.  · Some people get severe pain in the shoulder and have difficulty moving the arm where a shot was given. This happens very rarely.  · Any medication can cause a severe allergic reaction. Such reactions from a vaccine are very rare, estimated at fewer than 1 in a million doses, and would happen within a few minutes to a few hours after the vaccination.  · As with any medicine, there is a very remote chance of a vaccine causing a serious injury or death.  · The safety of vaccines is always being monitored. For more information, visit: www.cdc.gov/vaccinesafety/    5. What if there is a serious problem?  What should I look for?  · Look for anything that concerns you, such as signs of a severe allergic reaction, very high fever, or unusual behavior.  · Signs of a severe allergic reaction can include hives, swelling of the face and throat, difficulty breathing, a fast heartbeat, dizziness, and weakness. These would usually start a few minutes to a few hours after the vaccination.  What should I do?  · If you think it is a severe allergic reaction or other emergency that can't wait, call 9-1-1 or get the person to the nearest hospital. Otherwise, call your doctor.  · Afterward, the reaction should be reported to the Vaccine Adverse Event Reporting System (VAERS). Your doctor might file this report, or you can do it yourself through the VAERS web site  at www.vaers.[facility].gov, or by calling 1-899.706.9470.  · VAERS does not give medical advice.    6. The National Vaccine Injury Compensation Program  The National Vaccine Injury Compensation Program (VICP) is a federal program that was created to compensate people who may have been injured by certain vaccines.  Persons who believe they may have been injured by a vaccine can learn about the program and about filing a claim by calling 1-746.693.8109 or visiting the VICP website at www.[facility].gov/vaccinecompensation. There is a time limit to file a claim for compensation.    7. How can I learn more?  · Ask your doctor. He or she can give you the vaccine package insert or suggest other sources of information.  · Call your local or state health department.  · Contact the Centers for Disease Control and Prevention (CDC):  ? Call 1-546.170.4350 (6-681-SCP-INFO) or  ? Visit CDC's website at www.cdc.gov/vaccines      Vaccine Information Statement Tdap Vaccine (2/24/2015)  This information is not intended to replace advice given to you by your health care provider. Make sure you discuss any questions you have with your health care provider.  Document Released: 06/18/2013 Document Revised: 08/05/2019 Document Reviewed: 08/05/2019  [employer] Interactive Patient Education © 2019 [employer]         Influenza (Flu) Vaccine (Inactivated or Recombinant): What You Need to Know      1. Why get vaccinated?  Influenza vaccine can prevent influenza (flu).  Flu is a contagious disease that spreads around the [place] every year, usually between October and May. Anyone can get the flu, but it is more dangerous for some people. Infants and young children, people 65 years of age and older, pregnant women, and people with certain health conditions or a weakened immune system are at greatest risk of flu complications.  Pneumonia, bronchitis, sinus infections and ear infections are examples of flu-related complications. If you have a medical  condition, such as heart disease, cancer or diabetes, flu can make it worse.  Flu can cause fever and chills, sore throat, muscle aches, fatigue, cough, headache, and runny or stuffy nose. Some people may have vomiting and diarrhea, though this is more common in children than adults.  Each year thousands of people in the [place] die from flu, and many more are hospitalized. Flu vaccine prevents millions of illnesses and flu-related visits to the doctor each year.  2. Influenza vaccine  CDC recommends everyone 6 months of age and older get vaccinated every flu season. Children 6 months through 8 years of age may need 2 doses during a single flu season. Everyone else needs only 1 dose each flu season.  It takes about 2 weeks for protection to develop after vaccination.  There are many flu viruses, and they are always changing. Each year a new flu vaccine is made to protect against three or four viruses that are likely to cause disease in the upcoming flu season. Even when the vaccine doesn't exactly match these viruses, it may still provide some protection.  Influenza vaccine does not cause flu.  Influenza vaccine may be given at the same time as other vaccines.  3. Talk with your health care provider  Tell your vaccine provider if the person getting the vaccine:  · Has had an allergic reaction after a previous dose of influenza vaccine, or has any severe, life-threatening allergies.  · Has ever had Guillain-Barré Syndrome (also called GBS).  In some cases, your health care provider may decide to postpone influenza vaccination to a future visit.  People with minor illnesses, such as a cold, may be vaccinated. People who are moderately or severely ill should usually wait until they recover before getting influenza vaccine.  Your health care provider can give you more information.  4. Risks of a vaccine reaction  · Soreness, redness, and swelling where shot is given, fever, muscle aches, and headache can happen  after influenza vaccine.  · There may be a very small increased risk of Guillain-Barré Syndrome (GBS) after inactivated influenza vaccine (the flu shot).  · Young children who get the flu shot along with pneumococcal vaccine (PCV13), and/or DTaP vaccine at the same time might be slightly more likely to have a seizure caused by fever. Tell your health care provider if a child who is getting flu vaccine has ever had a seizure.  · People sometimes faint after medical procedures, including vaccination. Tell your provider if you feel dizzy or have vision changes or ringing in the ears.  · As with any medicine, there is a very remote chance of a vaccine causing a severe allergic reaction, other serious injury, or death.  5. What if there is a serious problem?  An allergic reaction could occur after the vaccinated person leaves the clinic. If you see signs of a severe allergic reaction (hives, swelling of the face and throat, difficulty breathing, a fast heartbeat, dizziness, or weakness), call 9-1-1 and get the person to the nearest hospital.  For other signs that concern you, call your health care provider.  Adverse reactions should be reported to the Vaccine Adverse Event Reporting System (VAERS). Your health care provider will usually file this report, or you can do it yourself. Visit the VAERS website at www.vaers.hhs.gov or call 1-939.614.5127.VAERS is only for reporting reactions, and VAERS staff do not give medical advice.  6. The National Vaccine Injury Compensation Program  The National Vaccine Injury Compensation Program (VICP) is a federal program that was created to compensate people who may have been injured by certain vaccines. Visit the VICP website at www.hrsa.gov/vaccinecompensation or call 1-636.467.7316 to learn about the program and about filing a claim. There is a time limit to file a claim for compensation.  7. How can I learn more?  · Ask your healthcare provider.  · Call your local or [facility]  department.  · Contact the Centers for Disease Control and Prevention (CDC):  ? Call 1-895.583.1740 (2-809-JFX-INFO) or  ? Visit CDC's www.cdc.gov/flu    Vaccine Information Statement (Interim) Inactivated Influenza Vaccine (8/15/2019)  This information is not intended to replace advice given to you by your health care provider. Make sure you discuss any questions you have with your health care provider.  Document Released: 10/12/2007 Document Revised: 04/07/2020 Document Reviewed: 08/19/2019  Elsevier Patient Education © 2020 [employer]

## 2021-03-16 ENCOUNTER — BULK ORDERING (OUTPATIENT)
Dept: CASE MANAGEMENT | Facility: OTHER | Age: 31
End: 2021-03-16

## 2021-03-16 DIAGNOSIS — Z23 IMMUNIZATION DUE: ICD-10-CM

## 2021-04-19 NOTE — ANESTHESIA PREPROCEDURE EVALUATION
Anesthesia Evaluation     Patient summary reviewed and Nursing notes reviewed   NPO Solid Status: > 6 hours  NPO Liquid Status: > 6 hours           Airway   Mallampati: II  TM distance: >3 FB  Neck ROM: full  No difficulty expected  Dental          Pulmonary - negative pulmonary ROS   Cardiovascular - negative cardio ROS        Neuro/Psych  (+) psychiatric history Anxiety,     GI/Hepatic/Renal/Endo - negative ROS     Musculoskeletal (-) negative ROS    Abdominal    Substance History - negative use     OB/GYN    (+) Pregnant (twins),         Other - negative ROS                       Anesthesia Plan    ASA 2 - emergent     spinal and ITN   (Pt relates swelling in facial area with percocet.  Pt also relates that she is confident that she had morphine yesterday morning at another hospital and did well without reaction.  Discussed PF morphine in spinal for postop pain.  Will proceed with this.)    Anesthetic plan, all risks, benefits, and alternatives have been provided, discussed and informed consent has been obtained with: patient and spouse/significant other.       Quality 226: Preventive Care And Screening: Tobacco Use: Screening And Cessation Intervention: Patient screened for tobacco use and is an ex/non-smoker Detail Level: Detailed Quality 130: Documentation Of Current Medications In The Medical Record: Current Medications Documented Quality 402: Tobacco Use And Help With Quitting Among Adolescents: Patient screened for tobacco and never smoked Quality 431: Preventive Care And Screening: Unhealthy Alcohol Use - Screening: Patient screened for unhealthy alcohol use using a single question and scores less than 2 times per year

## 2021-05-18 ENCOUNTER — IMMUNIZATION (OUTPATIENT)
Dept: VACCINE CLINIC | Facility: HOSPITAL | Age: 31
End: 2021-05-18

## 2021-05-18 PROCEDURE — 0001A: CPT | Performed by: INTERNAL MEDICINE

## 2021-05-18 PROCEDURE — 91300 HC SARSCOV02 VAC 30MCG/0.3ML IM: CPT | Performed by: INTERNAL MEDICINE

## 2021-06-08 ENCOUNTER — IMMUNIZATION (OUTPATIENT)
Dept: VACCINE CLINIC | Facility: HOSPITAL | Age: 31
End: 2021-06-08

## 2021-06-08 PROCEDURE — 91300 HC SARSCOV02 VAC 30MCG/0.3ML IM: CPT | Performed by: INTERNAL MEDICINE

## 2021-06-08 PROCEDURE — 0002A: CPT | Performed by: INTERNAL MEDICINE

## 2022-03-31 ENCOUNTER — OFFICE VISIT (OUTPATIENT)
Dept: OBSTETRICS AND GYNECOLOGY | Facility: CLINIC | Age: 32
End: 2022-03-31

## 2022-03-31 ENCOUNTER — LAB (OUTPATIENT)
Dept: LAB | Facility: HOSPITAL | Age: 32
End: 2022-03-31

## 2022-03-31 VITALS
SYSTOLIC BLOOD PRESSURE: 116 MMHG | WEIGHT: 97 LBS | DIASTOLIC BLOOD PRESSURE: 74 MMHG | RESPIRATION RATE: 14 BRPM | BODY MASS INDEX: 18.94 KG/M2

## 2022-03-31 DIAGNOSIS — R10.32 LLQ ABDOMINAL PAIN: ICD-10-CM

## 2022-03-31 DIAGNOSIS — R10.32 LLQ ABDOMINAL PAIN: Primary | ICD-10-CM

## 2022-03-31 DIAGNOSIS — K58.2 IRRITABLE BOWEL SYNDROME WITH BOTH CONSTIPATION AND DIARRHEA: ICD-10-CM

## 2022-03-31 DIAGNOSIS — R10.31 RLQ ABDOMINAL PAIN: ICD-10-CM

## 2022-03-31 LAB
BACTERIA UR QL AUTO: NORMAL /HPF
BASOPHILS # BLD AUTO: 0.07 10*3/MM3 (ref 0–0.2)
BASOPHILS NFR BLD AUTO: 0.7 % (ref 0–1.5)
BILIRUB UR QL STRIP: NEGATIVE
CLARITY UR: CLEAR
COLOR UR: YELLOW
DEPRECATED RDW RBC AUTO: 40.3 FL (ref 37–54)
EOSINOPHIL # BLD AUTO: 0.11 10*3/MM3 (ref 0–0.4)
EOSINOPHIL NFR BLD AUTO: 1.2 % (ref 0.3–6.2)
ERYTHROCYTE [DISTWIDTH] IN BLOOD BY AUTOMATED COUNT: 13.1 % (ref 12.3–15.4)
ERYTHROCYTE [SEDIMENTATION RATE] IN BLOOD: 6 MM/HR (ref 0–20)
GLUCOSE UR STRIP-MCNC: NEGATIVE MG/DL
HCT VFR BLD AUTO: 42.6 % (ref 34–46.6)
HGB BLD-MCNC: 14.2 G/DL (ref 12–15.9)
HGB UR QL STRIP.AUTO: NEGATIVE
HYALINE CASTS UR QL AUTO: NORMAL /LPF
IMM GRANULOCYTES # BLD AUTO: 0.02 10*3/MM3 (ref 0–0.05)
IMM GRANULOCYTES NFR BLD AUTO: 0.2 % (ref 0–0.5)
KETONES UR QL STRIP: NEGATIVE
LEUKOCYTE ESTERASE UR QL STRIP.AUTO: NEGATIVE
LYMPHOCYTES # BLD AUTO: 2.81 10*3/MM3 (ref 0.7–3.1)
LYMPHOCYTES NFR BLD AUTO: 29.9 % (ref 19.6–45.3)
MCH RBC QN AUTO: 28.4 PG (ref 26.6–33)
MCHC RBC AUTO-ENTMCNC: 33.3 G/DL (ref 31.5–35.7)
MCV RBC AUTO: 85.2 FL (ref 79–97)
MONOCYTES # BLD AUTO: 0.72 10*3/MM3 (ref 0.1–0.9)
MONOCYTES NFR BLD AUTO: 7.7 % (ref 5–12)
NEUTROPHILS NFR BLD AUTO: 5.68 10*3/MM3 (ref 1.7–7)
NEUTROPHILS NFR BLD AUTO: 60.3 % (ref 42.7–76)
NITRITE UR QL STRIP: NEGATIVE
NRBC BLD AUTO-RTO: 0 /100 WBC (ref 0–0.2)
PH UR STRIP.AUTO: 6 [PH] (ref 5–8)
PLATELET # BLD AUTO: 302 10*3/MM3 (ref 140–450)
PMV BLD AUTO: 11.4 FL (ref 6–12)
PROT UR QL STRIP: NEGATIVE
RBC # BLD AUTO: 5 10*6/MM3 (ref 3.77–5.28)
RBC # UR STRIP: NORMAL /HPF
REF LAB TEST METHOD: NORMAL
SP GR UR STRIP: 1.02 (ref 1–1.03)
SQUAMOUS #/AREA URNS HPF: NORMAL /HPF
UROBILINOGEN UR QL STRIP: NORMAL
WBC # UR STRIP: NORMAL /HPF
WBC NRBC COR # BLD: 9.41 10*3/MM3 (ref 3.4–10.8)

## 2022-03-31 PROCEDURE — 81001 URINALYSIS AUTO W/SCOPE: CPT

## 2022-03-31 PROCEDURE — 99214 OFFICE O/P EST MOD 30 MIN: CPT | Performed by: OBSTETRICS & GYNECOLOGY

## 2022-03-31 PROCEDURE — 85652 RBC SED RATE AUTOMATED: CPT

## 2022-03-31 PROCEDURE — 36415 COLL VENOUS BLD VENIPUNCTURE: CPT

## 2022-03-31 PROCEDURE — 85025 COMPLETE CBC W/AUTO DIFF WBC: CPT

## 2022-03-31 RX ORDER — DICYCLOMINE HYDROCHLORIDE 10 MG/1
10 CAPSULE ORAL 4 TIMES DAILY PRN
Qty: 120 CAPSULE | Refills: 3
Start: 2022-03-31 | End: 2023-03-31

## 2022-03-31 RX ORDER — ACYCLOVIR 800 MG/1
TABLET ORAL
COMMUNITY
Start: 2022-02-03 | End: 2022-05-09

## 2022-03-31 RX ORDER — BACLOFEN 5 MG/1
5 TABLET ORAL DAILY PRN
COMMUNITY

## 2022-04-07 ENCOUNTER — TRANSCRIBE ORDERS (OUTPATIENT)
Dept: LAB | Facility: HOSPITAL | Age: 32
End: 2022-04-07

## 2022-04-07 DIAGNOSIS — Z01.818 OTHER SPECIFIED PRE-OPERATIVE EXAMINATION: Primary | ICD-10-CM

## 2022-05-06 NOTE — DISCHARGE INSTRUCTIONS
5/11/22  0830   ARRIVAL TIME    TAKE the following medications the morning of surgery:  All heart or blood pressure medications    HOLD all diabetic medications the morning of surgery as ordered by physician.    Please discontinue all blood thinners and anticoagulants (except aspirin) prior to surgery as per your surgeon and cardiologist instructions.  Aspirin may be continued up to the day prior to surgery.     CHLORHEXIDINE CLOTHS GIVEN WITH INSTRUCTIONS AND FORM TO RETURN TO HOSPITAL, IF APPLICABLE.    General Instructions:  Do not eat or drink after midnight: includes water, mints, or gum. You may brush your teeth.  Dental appliances that are removable must be taken out day of surgery.  Do not smoke, chew tobacco, or drink alcohol.  Bring medications in original bottles, any inhalers and if applicable your C-PAP/BI-PAP machine.  Bring any papers given to you in the doctor's office.  Wear clean comfortable clothes and socks.  Do not wear contact lenses or make-up. Bring a case for your glasses if applicable.  Bring crutches or walker if applicable.  Leave all other valuables and jewelry at home.    If you were given a blood bank ID arm band remember to bring it with you the day of surgery.    Preventing a Surgical Site Infection:  Shower the night before surgery (unless instructed other wise) using a fresh bar of anti-bacterial soap (such as Dial) and clean washcloth. Dry with a clean towel and dress in clean clothing.  For 2 to 3 days before surgery, avoid shaving with a razor near where you will have surgery because the razor can irritate skin and make it easier to develop an infection. Ask your surgeon if you will be receiving antibiotics prior to surgery.  Make sure you, your family, and all healthcare providers clear their hands with soap and water or an alcohol-based hand  before caring for you or your wound.  If at all possible, quit smoking as many days before surgery as you can.    Day of  surgery:  Upon arrival, a Pre-op nurse and Anesthesiologist will review your health history, obtain vital signs, and answer questions you may have. The only belongings needed at this time will be your home medications and if applicable your C-PAP/BI-PAP machine. If you are staying overnight your family can leave the rest of your belongings in the car and bring them to your room later. A Pre-op nurse will start an IV and you may receive medication in preparation for surgery, including something to help you relax. Your family will be able to see you in the Pre-op area. While you are in surgery your family should notify the waiting room  if they leave the waiting room area and provide a contact phone number.    Please be aware that surgery does come with discomfort. We want to make every effort to control your discomfort so please discuss any uncontrolled symptoms with your nurse. Your doctor will most likely have prescribed pain medications.    If you are going home after surgery you will receive individualized written care instructions before being discharged. A responsible adult must drive you to and from the hospital on the day of surgery and stay with you for 24 hours.    If you are staying overnight following surgery, you will be transported to your hospital room following the recovery period.  [facility] has all private rooms.    If you have any questions please call Pre-Admission Testing at [phone].  Deductibles and co-payments are collected on the day of service. Please be prepared to pay the required co-pay, deductible or deposit on the day of service as defined by your plan.    A RESPONSIBLE PERSON MUST REMAIN IN THE WAITING ROOM DURING YOUR PROCEDURE AND A RESPONSIBLE  MUST BE AVAILABLE UPON YOUR DISCHARGE.

## 2022-05-08 ENCOUNTER — PREP FOR SURGERY (OUTPATIENT)
Dept: OTHER | Facility: HOSPITAL | Age: 32
End: 2022-05-08

## 2022-05-08 DIAGNOSIS — R10.2 PAIN IN FEMALE PELVIS: ICD-10-CM

## 2022-05-08 DIAGNOSIS — N80.9 ENDOMETRIOSIS DETERMINED BY LAPAROSCOPY: Primary | ICD-10-CM

## 2022-05-08 RX ORDER — SODIUM CHLORIDE 0.9 % (FLUSH) 0.9 %
10 SYRINGE (ML) INJECTION AS NEEDED
Status: CANCELLED | OUTPATIENT
Start: 2022-05-11

## 2022-05-08 RX ORDER — SODIUM CHLORIDE 0.9 % (FLUSH) 0.9 %
10 SYRINGE (ML) INJECTION EVERY 12 HOURS SCHEDULED
Status: CANCELLED | OUTPATIENT
Start: 2022-05-11

## 2022-05-09 ENCOUNTER — LAB (OUTPATIENT)
Dept: LAB | Facility: HOSPITAL | Age: 32
End: 2022-05-09

## 2022-05-09 ENCOUNTER — PRE-ADMISSION TESTING (OUTPATIENT)
Dept: PREADMISSION TESTING | Facility: HOSPITAL | Age: 32
End: 2022-05-09

## 2022-05-09 DIAGNOSIS — Z01.818 OTHER SPECIFIED PRE-OPERATIVE EXAMINATION: ICD-10-CM

## 2022-05-09 DIAGNOSIS — R10.2 PAIN IN FEMALE PELVIS: ICD-10-CM

## 2022-05-09 DIAGNOSIS — N80.9 ENDOMETRIOSIS DETERMINED BY LAPAROSCOPY: ICD-10-CM

## 2022-05-09 LAB
ABO GROUP BLD: NORMAL
BASOPHILS # BLD AUTO: 0.05 10*3/MM3 (ref 0–0.2)
BASOPHILS NFR BLD AUTO: 0.4 % (ref 0–1.5)
BLD GP AB SCN SERPL QL: NEGATIVE
DEPRECATED RDW RBC AUTO: 39.8 FL (ref 37–54)
EOSINOPHIL # BLD AUTO: 0.08 10*3/MM3 (ref 0–0.4)
EOSINOPHIL NFR BLD AUTO: 0.6 % (ref 0.3–6.2)
ERYTHROCYTE [DISTWIDTH] IN BLOOD BY AUTOMATED COUNT: 13.2 % (ref 12.3–15.4)
HCG SERPL QL: NEGATIVE
HCT VFR BLD AUTO: 41.8 % (ref 34–46.6)
HGB BLD-MCNC: 14.1 G/DL (ref 12–15.9)
IMM GRANULOCYTES # BLD AUTO: 0.05 10*3/MM3 (ref 0–0.05)
IMM GRANULOCYTES NFR BLD AUTO: 0.4 % (ref 0–0.5)
LYMPHOCYTES # BLD AUTO: 2.34 10*3/MM3 (ref 0.7–3.1)
LYMPHOCYTES NFR BLD AUTO: 19 % (ref 19.6–45.3)
MCH RBC QN AUTO: 28 PG (ref 26.6–33)
MCHC RBC AUTO-ENTMCNC: 33.7 G/DL (ref 31.5–35.7)
MCV RBC AUTO: 83.1 FL (ref 79–97)
MONOCYTES # BLD AUTO: 0.77 10*3/MM3 (ref 0.1–0.9)
MONOCYTES NFR BLD AUTO: 6.2 % (ref 5–12)
NEUTROPHILS NFR BLD AUTO: 73.4 % (ref 42.7–76)
NEUTROPHILS NFR BLD AUTO: 9.04 10*3/MM3 (ref 1.7–7)
NRBC BLD AUTO-RTO: 0 /100 WBC (ref 0–0.2)
PLATELET # BLD AUTO: 335 10*3/MM3 (ref 140–450)
PMV BLD AUTO: 10.4 FL (ref 6–12)
RBC # BLD AUTO: 5.03 10*6/MM3 (ref 3.77–5.28)
RH BLD: POSITIVE
T&S EXPIRATION DATE: NORMAL
WBC NRBC COR # BLD: 12.33 10*3/MM3 (ref 3.4–10.8)

## 2022-05-09 PROCEDURE — 36415 COLL VENOUS BLD VENIPUNCTURE: CPT

## 2022-05-09 PROCEDURE — 86900 BLOOD TYPING SEROLOGIC ABO: CPT

## 2022-05-09 PROCEDURE — U0004 COV-19 TEST NON-CDC HGH THRU: HCPCS | Performed by: OBSTETRICS & GYNECOLOGY

## 2022-05-09 PROCEDURE — 85025 COMPLETE CBC W/AUTO DIFF WBC: CPT

## 2022-05-09 PROCEDURE — 84703 CHORIONIC GONADOTROPIN ASSAY: CPT

## 2022-05-09 PROCEDURE — 86850 RBC ANTIBODY SCREEN: CPT

## 2022-05-09 PROCEDURE — 86901 BLOOD TYPING SEROLOGIC RH(D): CPT

## 2022-05-10 LAB — SARS-COV-2 RNA PNL SPEC NAA+PROBE: NOT DETECTED

## 2022-05-10 NOTE — H&P
This 31 year old patient presents for Pre-Op Visit.    History of Present Illness  1.  Pre-Op Visit   She is here today for her preoperative visit.  We are doing a robotic total laparoscopic hysterectomy possible bilateral salpingo-oophorectomy due to unrelenting pelvic pain from endometriosis.  Her ultrasound today is normal.  We discussed the procedure recovery.      Pre-op counseling done.  We discussed the procedure and all risks and benefits.  She consents to proceed.  Medications and medical history reviewed.          Gynecologic History  2022 02:00 PM  Date of last Pap: 2021.    Obstetric History  Currently pregnant.   Past Systemic History    Medical History  (Reviewed,updated)    Surgical History/Management  (Reviewed,updated)  Management Date Comments   Diagnostic laparoscopy with cautery of endometriosis 2019    laparoscopy 2016    Laparoscopy with lysis of adhesions, cautery of endometriosis, Hysteroscopy, D&C 10/20/2015    Wrist 2014    Primary low segment transverse  section ([facility]) 2013    Oral surgery     EGD     D&C 2016      Diagnostics History  Status Study Ordered Completed Interpretation  Result / Report   ordered HOLTER MONITOR 2019       ordered OB US >/= 14 WKS, ADDL FETUS 2019       obtained PAP, send out 2017       completed Thin Prep 2017   See detail   completed TRANSVAGINAL US, NON-OB 2018      completed TRANSVAGINAL US, NON-OB 2018      completed TRANSVAGINAL US, NON-OB 2019      completed TRANSVAGINAL US, NON-OB 2022      ordered TRANSVAGINAL US, NON-OB 2022       completed X-RAY SHOULDER Right shoulder 2022   see xray ID#[id number]     Pap Result, HPV Detail and Treatment Performed        Problem List  Problem List reviewed.   Problem Description Onset Date Chronic Clinical Status Notes   Anxiety 2017 N          Medications (active prior to today)  Patient is on no medications.   Medication Name Sig Description Start Date Stop Date Refilled Rx Elsewhere   ibuprofen 600 mg tablet take 1 tablet by oral route 3 times every day with food 2022   N     Patient Status   Completed with information received for patient transitioning into care.   Completed with information received for patient in a summary of care record.     Medication Reconciliation  Medications reconciled today.    Medication Reviewed  Adherence Medication Name Sig Desc Elsewhere Status   taking as directed ibuprofen 600 mg tablet take 1 tablet by oral route 3 times every day with food N Verified     Allergies  Ingredient Reaction (Severity) Medication Name Comment   ACETAMINOPHEN Skin irritation PERCOCET    ACETAMINOPHEN palpataions, rash Tylenol-Codeine #3    CODEINE PHOSPHATE palpataions, rash Tylenol-Codeine #3    OXYCODONE HCL Skin irritation PERCOCET          Family History    (Reviewed, updated)  Relationship Family Member Name  Age at Death Condition Onset Age Cause of Death   Maternal grandfather    Heart disease  N   Maternal grandfather    Hypertension  N   Maternal grandmother    Thyroid disease  N   Maternal grandmother    Stroke  N   Sister    Diabetes mellitus  N   M    Social History  (Reviewed, updated)  Tobacco use reviewed.    Preferred language is English.      Marital Status/Family/Social Support  Marital status:      Tobacco use status: Current non-smoker.    Smoking status: Never smoker.    Tobacco Screening  Patient has never used tobacco. Patient has not used tobacco in the last 30 days. Patient has not used smokeless tobacco in the last 30 days.    Smoking Status  Type Smoking Status Usage Per Day Years Used Pack Years Total Pack Years    Never smoker         Tobacco Cessation Information  Date Counseled By Order Status Description Code Tobacco Cessation Information   2016 Ashley [place], [place] Tobacco  cessation counseling completed   Smoking cessation education   12/28/2016 Ashley Col[place]ter, [place] Tobacco cessation counseling completed   Tobacco cessation counseling   05/09/2022  Tobacco cessation counseling ordered          Vaping Use  Screened for vaping? Yes  Status: Not a current user  Vaping cessation discussed: No    Tobacco/Vaping Exposure  No passive vaping exposure.  No passive smoke exposure.      Alcohol  There is no history of alcohol use.     Caffeine  The patient does not use caffeine.    Lifestyle  Diet  healthy.    Review of Systems  System Neg/Pos Details   Respiratory Negative Dyspnea.   Cardio Negative Chest pain.       Vital Signs   Time BP mm/Hg Pulse /min Resp /min Temp F Ht ft Ht in Ht cm Wt lb Wt kg BMI kg/m2 BSA m2 O2 Sat%   1:37 /70 73 18 97.1 5  152.4 94 42.638 18.36 1.34 98     Measured by  Time Measured by   1:37 PM Kaila Skinner         Screening Summary  The following were reviewed: tobacco use, alcohol use, caffeine use, drugs of abuse and date of last pap        Physical Exam  Exam Findings Details   Constitutional Normal Well developed.   Respiratory Normal Inspection - Normal. Auscultation - Normal. Effort - Normal.   Cardiovascular Normal Regular rhythm.  No murmurs, gallops, or rubs.   Abdomen Normal Anterior palpation -  No rebound. No abdominal tenderness.   Genitourinary * Pelvic deferred.   Psychiatric Normal Orientation - Oriented to time, place, person & situation. Appropriate mood and affect.       Completed Orders (This Visit)  Order Details Reason Side Interpretation Result Additional Info Initial Treatment Date Region   TRANSVAGINAL US, NON-OB           Patient Health Questionnaire (PHQ-2)    Further testing is not required 0      Dietary management education, guidance, and counseling           Pre-Visit Planning             Assessment/Plan  # Detail Type Description    1. Assessment Pelvic pain in female (R10.2).    Plan Orders The patient had the following  order(s) completed today: TRANSVAGINAL US, NON-OB. Obtained on 05/09/2022 and TRANSVAGINAL US, NON-OB to be performed.         2. Assessment Dietary counseling and surveillance (Z71.3).    Plan Orders Today's instructions / counseling include(s) Dietary management education, guidance, and counseling. and Dietary management education, guidance, and counseling.Giving encouragement to exercise         3. Assessment Encounter for other preprocedural examination (Z01.818).    Patient Plan -Patient educated to not eat or drink after midnight the day before surgery which includes water, mint or gum.   -You may brush your teeth.    -Do NOT smoke, chew tobacco, or drink alcohol within 24 hours prior to surgery.    -Wear clean, comfortable clothes and socks. Do not wear contact lenses or make-up or dark nail polish.  Bring a case for your glasses if applicable.    -Please keep your (red band - blood bank armband) and continue to wear until discharged after surgery.    -Call if questions regarding surgery.         4. Assessment Endometriosis determined by laparoscopy (N80.9).            Diagnostic History Entered Today  Performed Study Interpretation Result   05/09/2022 Pre-Visit Planning         Clinical Guidelines (Reviewed, updated)  Guidelines Status Due Action Last Addressed   Depression screening completed 05/09/2023 Completed on 05/09/2022 05/09/2022   Lipid panel completed 01/18/2024 Completed on 01/18/2019 01/18/2019   PAP completed 01/21/2024 Completed on 01/21/2021 01/21/2021   Pap/HPV testing completed 06/21/2022 Completed on 06/21/2017 06/21/2017   PRAPARE Assessment completed 04/12/2023 Completed on 04/12/2022 04/12/2022   Pre-Visit Planning completed 05/16/2022 Completed on 05/09/2022 05/09/2022     Clinical Guidelines (Declined or Excluded)  Guidelines Status Due Action Last Addressed   HIV Screen Declined 05/09/2022 refused        Medications (Added, Continued or Stopped today)  Start Date Medication  Directions PRN Status PRN Reason Instruction Stop Date   04/12/2022 ibuprofen 600 mg tablet take 1 tablet by oral route 3 times every day with food N          To Be Scheduled / Ordered  Status Order Reason Assessment Timeframe Appointment   ordered TRANSVAGINAL US, NON-OB  R10.2     ordered Tobacco cessation counseling       ordered Giving encouragement to exercise  Z71.3     ordered Dietary management education, guidance, and counseling  Z71.3         Orders/Procedures/Instructions/Educations  Diagnostics/Procedures Completed This Encounter  TRANSVAGINAL US, NON-OB       Diagnostics/Procedures To Be Scheduled  Order Timeframe   TRANSVAGINAL US, NON-OB        Counseling / Educational Factors  Counseling / educational factors reviewed.

## 2022-05-11 ENCOUNTER — ANESTHESIA (OUTPATIENT)
Dept: PERIOP | Facility: HOSPITAL | Age: 32
End: 2022-05-11

## 2022-05-11 ENCOUNTER — ANESTHESIA EVENT (OUTPATIENT)
Dept: PERIOP | Facility: HOSPITAL | Age: 32
End: 2022-05-11

## 2022-05-11 ENCOUNTER — APPOINTMENT (OUTPATIENT)
Dept: GENERAL RADIOLOGY | Facility: HOSPITAL | Age: 32
End: 2022-05-11

## 2022-05-11 ENCOUNTER — HOSPITAL ENCOUNTER (OUTPATIENT)
Facility: HOSPITAL | Age: 32
Discharge: HOME | End: 2022-05-12
Attending: OBSTETRICS & GYNECOLOGY | Admitting: OBSTETRICS & GYNECOLOGY

## 2022-05-11 DIAGNOSIS — R10.2 PELVIC PAIN IN FEMALE: ICD-10-CM

## 2022-05-11 DIAGNOSIS — N80.9 ENDOMETRIOSIS DETERMINED BY LAPAROSCOPY: ICD-10-CM

## 2022-05-11 DIAGNOSIS — R10.2 PAIN IN FEMALE PELVIS: ICD-10-CM

## 2022-05-11 DIAGNOSIS — Z48.89 ENCOUNTER FOR POSTOPERATIVE CARE: Primary | ICD-10-CM

## 2022-05-11 LAB
B-HCG UR QL: NEGATIVE
EXPIRATION DATE: NORMAL
INTERNAL NEGATIVE CONTROL: NEGATIVE
INTERNAL POSITIVE CONTROL: POSITIVE
Lab: NORMAL

## 2022-05-11 PROCEDURE — 25010000002 PROPOFOL 10 MG/ML EMULSION: Performed by: NURSE ANESTHETIST, CERTIFIED REGISTERED

## 2022-05-11 PROCEDURE — 25010000002 FENTANYL CITRATE (PF) 50 MCG/ML SOLUTION: Performed by: NURSE ANESTHETIST, CERTIFIED REGISTERED

## 2022-05-11 PROCEDURE — 25010000002 MIDAZOLAM PER 1 MG: Performed by: NURSE ANESTHETIST, CERTIFIED REGISTERED

## 2022-05-11 PROCEDURE — 25010000002 DROPERIDOL PER 5 MG: Performed by: ANESTHESIOLOGY

## 2022-05-11 PROCEDURE — C9290 INJ, BUPIVACAINE LIPOSOME: HCPCS | Performed by: ANESTHESIOLOGY

## 2022-05-11 PROCEDURE — 25010000002 NEOSTIGMINE 10 MG/10ML SOLUTION: Performed by: NURSE ANESTHETIST, CERTIFIED REGISTERED

## 2022-05-11 PROCEDURE — 0 BUPIVACAINE LIPOSOME 1.3 % SUSPENSION: Performed by: ANESTHESIOLOGY

## 2022-05-11 PROCEDURE — 25010000002 KETOROLAC TROMETHAMINE PER 15 MG: Performed by: NURSE ANESTHETIST, CERTIFIED REGISTERED

## 2022-05-11 PROCEDURE — 81025 URINE PREGNANCY TEST: CPT | Performed by: ANESTHESIOLOGY

## 2022-05-11 PROCEDURE — 25010000002 DEXAMETHASONE PER 1 MG: Performed by: NURSE ANESTHETIST, CERTIFIED REGISTERED

## 2022-05-11 PROCEDURE — 25010000002 HYDROMORPHONE 1 MG/ML SOLUTION: Performed by: NURSE ANESTHETIST, CERTIFIED REGISTERED

## 2022-05-11 PROCEDURE — 25010000002 KETOROLAC TROMETHAMINE PER 15 MG: Performed by: OBSTETRICS & GYNECOLOGY

## 2022-05-11 PROCEDURE — 25010000002 CEFOXITIN PER 1 G: Performed by: NURSE PRACTITIONER

## 2022-05-11 PROCEDURE — 25010000002 ONDANSETRON PER 1 MG: Performed by: NURSE ANESTHETIST, CERTIFIED REGISTERED

## 2022-05-11 DEVICE — KNOTLESS TISSUE CONTROL DEVICE, UNDYED UNIDIRECTIONAL (ANTIBACTERIAL) SYNTHETIC ABSORBABLE DEVICE
Type: IMPLANTABLE DEVICE | Site: ABDOMEN | Status: FUNCTIONAL
Brand: STRATAFIX

## 2022-05-11 RX ORDER — ONDANSETRON 4 MG/1
4 TABLET, FILM COATED ORAL EVERY 6 HOURS PRN
Status: DISCONTINUED | OUTPATIENT
Start: 2022-05-11 | End: 2022-05-12 | Stop reason: HOSPADM

## 2022-05-11 RX ORDER — SODIUM CHLORIDE, SODIUM LACTATE, POTASSIUM CHLORIDE, CALCIUM CHLORIDE 600; 310; 30; 20 MG/100ML; MG/100ML; MG/100ML; MG/100ML
125 INJECTION, SOLUTION INTRAVENOUS CONTINUOUS
Status: DISCONTINUED | OUTPATIENT
Start: 2022-05-11 | End: 2022-05-12 | Stop reason: HOSPADM

## 2022-05-11 RX ORDER — DICYCLOMINE HYDROCHLORIDE 10 MG/1
10 CAPSULE ORAL 4 TIMES DAILY PRN
Status: DISCONTINUED | OUTPATIENT
Start: 2022-05-11 | End: 2022-05-12 | Stop reason: HOSPADM

## 2022-05-11 RX ORDER — SODIUM CHLORIDE 0.9 % (FLUSH) 0.9 %
10 SYRINGE (ML) INJECTION AS NEEDED
Status: DISCONTINUED | OUTPATIENT
Start: 2022-05-11 | End: 2022-05-11 | Stop reason: HOSPADM

## 2022-05-11 RX ORDER — FENTANYL CITRATE 50 UG/ML
50 INJECTION, SOLUTION INTRAMUSCULAR; INTRAVENOUS
Status: DISCONTINUED | OUTPATIENT
Start: 2022-05-11 | End: 2022-05-11 | Stop reason: HOSPADM

## 2022-05-11 RX ORDER — DEXAMETHASONE SODIUM PHOSPHATE 4 MG/ML
INJECTION, SOLUTION INTRA-ARTICULAR; INTRALESIONAL; INTRAMUSCULAR; INTRAVENOUS; SOFT TISSUE AS NEEDED
Status: DISCONTINUED | OUTPATIENT
Start: 2022-05-11 | End: 2022-05-11 | Stop reason: SURG

## 2022-05-11 RX ORDER — MIDAZOLAM HYDROCHLORIDE 1 MG/ML
INJECTION INTRAMUSCULAR; INTRAVENOUS AS NEEDED
Status: DISCONTINUED | OUTPATIENT
Start: 2022-05-11 | End: 2022-05-11 | Stop reason: SURG

## 2022-05-11 RX ORDER — ONDANSETRON 2 MG/ML
4 INJECTION INTRAMUSCULAR; INTRAVENOUS EVERY 6 HOURS PRN
Status: DISCONTINUED | OUTPATIENT
Start: 2022-05-11 | End: 2022-05-12 | Stop reason: HOSPADM

## 2022-05-11 RX ORDER — MIDAZOLAM HYDROCHLORIDE 1 MG/ML
1 INJECTION INTRAMUSCULAR; INTRAVENOUS
Status: DISCONTINUED | OUTPATIENT
Start: 2022-05-11 | End: 2022-05-11 | Stop reason: HOSPADM

## 2022-05-11 RX ORDER — MEPERIDINE HYDROCHLORIDE 25 MG/ML
12.5 INJECTION INTRAMUSCULAR; INTRAVENOUS; SUBCUTANEOUS
Status: DISCONTINUED | OUTPATIENT
Start: 2022-05-11 | End: 2022-05-11 | Stop reason: HOSPADM

## 2022-05-11 RX ORDER — SODIUM CHLORIDE, SODIUM LACTATE, POTASSIUM CHLORIDE, CALCIUM CHLORIDE 600; 310; 30; 20 MG/100ML; MG/100ML; MG/100ML; MG/100ML
125 INJECTION, SOLUTION INTRAVENOUS ONCE
Status: COMPLETED | OUTPATIENT
Start: 2022-05-11 | End: 2022-05-11

## 2022-05-11 RX ORDER — ONDANSETRON 2 MG/ML
4 INJECTION INTRAMUSCULAR; INTRAVENOUS AS NEEDED
Status: DISCONTINUED | OUTPATIENT
Start: 2022-05-11 | End: 2022-05-11 | Stop reason: HOSPADM

## 2022-05-11 RX ORDER — SODIUM CHLORIDE 0.9 % (FLUSH) 0.9 %
10 SYRINGE (ML) INJECTION EVERY 12 HOURS SCHEDULED
Status: DISCONTINUED | OUTPATIENT
Start: 2022-05-11 | End: 2022-05-11 | Stop reason: HOSPADM

## 2022-05-11 RX ORDER — FAMOTIDINE 10 MG/ML
INJECTION, SOLUTION INTRAVENOUS AS NEEDED
Status: DISCONTINUED | OUTPATIENT
Start: 2022-05-11 | End: 2022-05-11 | Stop reason: SURG

## 2022-05-11 RX ORDER — PROPOFOL 10 MG/ML
VIAL (ML) INTRAVENOUS AS NEEDED
Status: DISCONTINUED | OUTPATIENT
Start: 2022-05-11 | End: 2022-05-11 | Stop reason: SURG

## 2022-05-11 RX ORDER — SODIUM CHLORIDE, SODIUM LACTATE, POTASSIUM CHLORIDE, CALCIUM CHLORIDE 600; 310; 30; 20 MG/100ML; MG/100ML; MG/100ML; MG/100ML
100 INJECTION, SOLUTION INTRAVENOUS ONCE AS NEEDED
Status: DISCONTINUED | OUTPATIENT
Start: 2022-05-11 | End: 2022-05-11 | Stop reason: HOSPADM

## 2022-05-11 RX ORDER — HYDROMORPHONE HYDROCHLORIDE 1 MG/ML
0.5 INJECTION, SOLUTION INTRAMUSCULAR; INTRAVENOUS; SUBCUTANEOUS
Status: DISCONTINUED | OUTPATIENT
Start: 2022-05-11 | End: 2022-05-12 | Stop reason: HOSPADM

## 2022-05-11 RX ORDER — FENTANYL CITRATE 50 UG/ML
INJECTION, SOLUTION INTRAMUSCULAR; INTRAVENOUS AS NEEDED
Status: DISCONTINUED | OUTPATIENT
Start: 2022-05-11 | End: 2022-05-11 | Stop reason: SURG

## 2022-05-11 RX ORDER — IPRATROPIUM BROMIDE AND ALBUTEROL SULFATE 2.5; .5 MG/3ML; MG/3ML
3 SOLUTION RESPIRATORY (INHALATION) ONCE AS NEEDED
Status: DISCONTINUED | OUTPATIENT
Start: 2022-05-11 | End: 2022-05-11 | Stop reason: HOSPADM

## 2022-05-11 RX ORDER — BUPIVACAINE HYDROCHLORIDE 5 MG/ML
INJECTION, SOLUTION EPIDURAL; INTRACAUDAL
Status: COMPLETED | OUTPATIENT
Start: 2022-05-11 | End: 2022-05-11

## 2022-05-11 RX ORDER — BACLOFEN 10 MG/1
5 TABLET ORAL DAILY PRN
Status: DISCONTINUED | OUTPATIENT
Start: 2022-05-11 | End: 2022-05-12 | Stop reason: HOSPADM

## 2022-05-11 RX ORDER — ONDANSETRON 2 MG/ML
INJECTION INTRAMUSCULAR; INTRAVENOUS AS NEEDED
Status: DISCONTINUED | OUTPATIENT
Start: 2022-05-11 | End: 2022-05-11 | Stop reason: SURG

## 2022-05-11 RX ORDER — ROCURONIUM BROMIDE 10 MG/ML
INJECTION, SOLUTION INTRAVENOUS AS NEEDED
Status: DISCONTINUED | OUTPATIENT
Start: 2022-05-11 | End: 2022-05-11 | Stop reason: SURG

## 2022-05-11 RX ORDER — NALOXONE HCL 0.4 MG/ML
0.1 VIAL (ML) INJECTION
Status: DISCONTINUED | OUTPATIENT
Start: 2022-05-11 | End: 2022-05-12 | Stop reason: HOSPADM

## 2022-05-11 RX ORDER — DROPERIDOL 2.5 MG/ML
0.62 INJECTION, SOLUTION INTRAMUSCULAR; INTRAVENOUS ONCE
Status: COMPLETED | OUTPATIENT
Start: 2022-05-11 | End: 2022-05-11

## 2022-05-11 RX ORDER — LIDOCAINE HYDROCHLORIDE 20 MG/ML
INJECTION, SOLUTION INFILTRATION; PERINEURAL AS NEEDED
Status: DISCONTINUED | OUTPATIENT
Start: 2022-05-11 | End: 2022-05-11 | Stop reason: SURG

## 2022-05-11 RX ORDER — SIMETHICONE 80 MG
80 TABLET,CHEWABLE ORAL 4 TIMES DAILY PRN
Status: DISCONTINUED | OUTPATIENT
Start: 2022-05-11 | End: 2022-05-12 | Stop reason: HOSPADM

## 2022-05-11 RX ORDER — GLYCOPYRROLATE 0.2 MG/ML
INJECTION INTRAMUSCULAR; INTRAVENOUS AS NEEDED
Status: DISCONTINUED | OUTPATIENT
Start: 2022-05-11 | End: 2022-05-11 | Stop reason: SURG

## 2022-05-11 RX ORDER — KETOROLAC TROMETHAMINE 30 MG/ML
INJECTION, SOLUTION INTRAMUSCULAR; INTRAVENOUS AS NEEDED
Status: DISCONTINUED | OUTPATIENT
Start: 2022-05-11 | End: 2022-05-11 | Stop reason: SURG

## 2022-05-11 RX ORDER — HYDROCODONE BITARTRATE AND ACETAMINOPHEN 5; 325 MG/1; MG/1
2 TABLET ORAL EVERY 4 HOURS PRN
Status: DISCONTINUED | OUTPATIENT
Start: 2022-05-11 | End: 2022-05-12 | Stop reason: HOSPADM

## 2022-05-11 RX ORDER — KETOROLAC TROMETHAMINE 30 MG/ML
30 INJECTION, SOLUTION INTRAMUSCULAR; INTRAVENOUS EVERY 6 HOURS PRN
Status: DISCONTINUED | OUTPATIENT
Start: 2022-05-11 | End: 2022-05-12 | Stop reason: HOSPADM

## 2022-05-11 RX ORDER — HYDROCODONE BITARTRATE AND ACETAMINOPHEN 5; 325 MG/1; MG/1
1 TABLET ORAL ONCE AS NEEDED
Status: COMPLETED | OUTPATIENT
Start: 2022-05-11 | End: 2022-05-11

## 2022-05-11 RX ORDER — IBUPROFEN 800 MG/1
800 TABLET ORAL 3 TIMES DAILY
Status: DISCONTINUED | OUTPATIENT
Start: 2022-05-11 | End: 2022-05-12 | Stop reason: HOSPADM

## 2022-05-11 RX ORDER — NEOSTIGMINE METHYLSULFATE 1 MG/ML
INJECTION, SOLUTION INTRAVENOUS AS NEEDED
Status: DISCONTINUED | OUTPATIENT
Start: 2022-05-11 | End: 2022-05-11 | Stop reason: SURG

## 2022-05-11 RX ORDER — METOCLOPRAMIDE HYDROCHLORIDE 5 MG/ML
10 INJECTION INTRAMUSCULAR; INTRAVENOUS EVERY 6 HOURS PRN
Status: DISCONTINUED | OUTPATIENT
Start: 2022-05-11 | End: 2022-05-12 | Stop reason: HOSPADM

## 2022-05-11 RX ADMIN — HYDROMORPHONE HYDROCHLORIDE 1 MG: 1 INJECTION, SOLUTION INTRAMUSCULAR; INTRAVENOUS; SUBCUTANEOUS at 12:07

## 2022-05-11 RX ADMIN — MIDAZOLAM 2 MG: 1 INJECTION INTRAMUSCULAR; INTRAVENOUS at 10:05

## 2022-05-11 RX ADMIN — IBUPROFEN 800 MG: 800 TABLET, FILM COATED ORAL at 21:05

## 2022-05-11 RX ADMIN — HYDROCODONE BITARTRATE AND ACETAMINOPHEN 2 TABLET: 5; 325 TABLET ORAL at 20:53

## 2022-05-11 RX ADMIN — ROCURONIUM BROMIDE 25 MG: 10 SOLUTION INTRAVENOUS at 10:13

## 2022-05-11 RX ADMIN — FENTANYL CITRATE 50 MCG: 50 INJECTION INTRAMUSCULAR; INTRAVENOUS at 11:34

## 2022-05-11 RX ADMIN — GLYCOPYRROLATE 0.4 MG: 0.2 INJECTION, SOLUTION INTRAMUSCULAR; INTRAVENOUS at 10:59

## 2022-05-11 RX ADMIN — DROPERIDOL 0.62 MG: 2.5 INJECTION, SOLUTION INTRAMUSCULAR; INTRAVENOUS at 12:05

## 2022-05-11 RX ADMIN — HYDROCODONE BITARTRATE AND ACETAMINOPHEN 1 TABLET: 5; 325 TABLET ORAL at 11:57

## 2022-05-11 RX ADMIN — ONDANSETRON 4 MG: 2 INJECTION INTRAMUSCULAR; INTRAVENOUS at 10:05

## 2022-05-11 RX ADMIN — LIDOCAINE HYDROCHLORIDE 40 MG: 20 INJECTION, SOLUTION INFILTRATION; PERINEURAL at 10:13

## 2022-05-11 RX ADMIN — NEOSTIGMINE 2 MG: 1 INJECTION INTRAVENOUS at 10:59

## 2022-05-11 RX ADMIN — BUPIVACAINE HYDROCHLORIDE 20 ML: 5 INJECTION, SOLUTION EPIDURAL; INTRACAUDAL; PERINEURAL at 10:18

## 2022-05-11 RX ADMIN — SIMETHICONE 80 MG: 80 TABLET, CHEWABLE ORAL at 22:28

## 2022-05-11 RX ADMIN — SODIUM CHLORIDE, POTASSIUM CHLORIDE, SODIUM LACTATE AND CALCIUM CHLORIDE: 600; 310; 30; 20 INJECTION, SOLUTION INTRAVENOUS at 10:05

## 2022-05-11 RX ADMIN — PROPOFOL 150 MG: 10 INJECTION, EMULSION INTRAVENOUS at 10:13

## 2022-05-11 RX ADMIN — FENTANYL CITRATE 100 MCG: 50 INJECTION INTRAMUSCULAR; INTRAVENOUS at 10:05

## 2022-05-11 RX ADMIN — SODIUM CHLORIDE, POTASSIUM CHLORIDE, SODIUM LACTATE AND CALCIUM CHLORIDE: 600; 310; 30; 20 INJECTION, SOLUTION INTRAVENOUS at 10:35

## 2022-05-11 RX ADMIN — FAMOTIDINE 20 MG: 10 INJECTION INTRAVENOUS at 10:05

## 2022-05-11 RX ADMIN — CEFOXITIN 2 G: 2 INJECTION, POWDER, FOR SOLUTION INTRAVENOUS at 10:16

## 2022-05-11 RX ADMIN — SODIUM CHLORIDE, POTASSIUM CHLORIDE, SODIUM LACTATE AND CALCIUM CHLORIDE 125 ML/HR: 600; 310; 30; 20 INJECTION, SOLUTION INTRAVENOUS at 09:32

## 2022-05-11 RX ADMIN — DEXAMETHASONE SODIUM PHOSPHATE 8 MG: 4 INJECTION, SOLUTION INTRA-ARTICULAR; INTRALESIONAL; INTRAMUSCULAR; INTRAVENOUS; SOFT TISSUE at 10:16

## 2022-05-11 RX ADMIN — HYDROCODONE BITARTRATE AND ACETAMINOPHEN 2 TABLET: 5; 325 TABLET ORAL at 15:29

## 2022-05-11 RX ADMIN — BUPIVACAINE 20 ML: 13.3 INJECTION, SUSPENSION, LIPOSOMAL INFILTRATION at 10:18

## 2022-05-11 RX ADMIN — KETOROLAC TROMETHAMINE 30 MG: 30 INJECTION, SOLUTION INTRAMUSCULAR at 10:16

## 2022-05-11 RX ADMIN — KETOROLAC TROMETHAMINE 30 MG: 30 INJECTION, SOLUTION INTRAMUSCULAR at 14:40

## 2022-05-11 NOTE — ANESTHESIA POSTPROCEDURE EVALUATION
Patient: Janie Hollis    Procedure Summary     Date: 05/11/22 Room / Location: [facility] OR 04 /  [facility] OR    Anesthesia Start: 1006 Anesthesia Stop: 1110    Procedure: ROBOTIC TOTAL LAPAROSCOPIC HYSTERECTOMY SALPINGECTOMY, (N/A Abdomen) Diagnosis: (R10.2)    Surgeons: Jose Cruz Ward DO Provider: Sunny Pelayo MD    Anesthesia Type: general with block ASA Status: 3          Anesthesia Type: general with block    Vitals  Vitals Value Taken Time   /69 05/11/22 1141   Temp 97.2 °F (36.2 °C) 05/11/22 1141   Pulse 69 05/11/22 1141   Resp 16 05/11/22 1141   SpO2 100 % 05/11/22 1141           Post Anesthesia Care and Evaluation    Patient location during evaluation: PACU  Patient participation: complete - patient participated  Level of consciousness: awake  Pain score: 1  Pain management: adequate  Airway patency: patent  Anesthetic complications: No anesthetic complications  PONV Status: none  Cardiovascular status: acceptable  Respiratory status: acceptable  Hydration status: acceptable

## 2022-05-11 NOTE — ANESTHESIA PROCEDURE NOTES
"Peripheral Block      Patient reassessed immediately prior to procedure    Patient location during procedure: OR  Start time: 5/11/2022 10:18 AM  Stop time: 5/11/2022 10:20 AM  Reason for block: at surgeon's request and post-op pain management  Performed by  CRNA/CAA: Khushboo Villarreal CRNA  Preanesthetic Checklist  Completed: patient identified, IV checked, site marked, risks and benefits discussed, surgical consent, monitors and equipment checked, pre-op evaluation and timeout performed  Prep:  Pt Position: supine  Sterile barriers:cap, gloves, sterile barriers and mask  Prep: ChloraPrep  Patient monitoring: blood pressure monitoring, continuous pulse oximetry and EKG  Procedure    Nursing cardiac assessment comments yes: Sedation, GA, Spinal,Epidural   Performed under: general  Guidance:ultrasound guided    ULTRASOUND INTERPRETATION. Using ultrasound guidance a 20 G (20g 4\" Stimuplex) gauge needle was placed in close proximity to the nerve, at which point, under ultrasound guidance anesthetic was injected in the area of the nerve and spread of the anesthesia was seen on ultrasound in close proximity thereto.  There were no abnormalities seen on ultrasound; a digital image was taken; and the patient tolerated the procedure with no complications. Images:still images obtained    Laterality:Bilateral  Block Type:TAP  Injection Technique:single-shot  Needle Type:short-bevel  Needle Gauge:20 G  Resistance on Injection: none    Medications Used: bupivacaine liposome (EXPAREL) 1.3 % injection, 20 mL  bupivacaine PF (MARCAINE) injection 0.5%, 20 mL  Med administered at 5/11/2022 10:18 AM      Medications  Comment:Block Injection:  Total volume divided equally between all 4 injection sites      Post Assessment  Injection Assessment: negative aspiration for heme, incremental injection and no paresthesia on injection  Patient Tolerance:comfortable throughout block  Complications:no  Additional Notes  The pt was in the " supine position under general anesthesia    Under Ultrasound guidance, a BBraun 4inch 360 degree needle was advanced.  The Internal Oblique and Transversus Abdominus muscles where visualized.  At or before the aponeurosis of Internal Oblique, local anesthetic spread was visualized in the Transversus Abdominus Plane. Injection was made incrementally with aspiration every 5 mls.  There was no  intravascular injection,  injection pressure was normal, there was no neural injection, and the procedure was completed without difficulty. The same procedure was completed for left and right sided lateral tap blocks.    Under Ultrasound guidance, a Aguila 4inch 360 degree needle was advanced.  The Rectus and Transversus Abdominus muscles where visualized.  The needle tip was placed between the Transversus Abdominus and rectus abdominus, local anesthetic spread was visualized in the Transversus Abdominus Plane. Injection was made incrementally with aspiration every 5 mls.  There was no  intravascular injection,  injection pressure was normal, there was no neural injection, and the procedure was completed without difficulty. The same procedure was completed for left and right sided subcostal tap blocks. Thank You.

## 2022-05-11 NOTE — ANESTHESIA PREPROCEDURE EVALUATION
Anesthesia Evaluation     Patient summary reviewed and Nursing notes reviewed   no history of anesthetic complications:  NPO Solid Status: > 8 hours  NPO Liquid Status: > 8 hours           Airway   Mallampati: I  TM distance: >3 FB  Neck ROM: full  No difficulty expected  Dental    (+) poor dentition        Pulmonary - negative pulmonary ROS and normal exam   Cardiovascular - negative cardio ROS and normal exam        Neuro/Psych  (+) psychiatric history Anxiety,    GI/Hepatic/Renal/Endo - negative ROS     Musculoskeletal (-) negative ROS    Abdominal  - normal exam   Substance History - negative use     OB/GYN    (-)  Pregnant (twins)    Comment: Endometriosis      Other - negative ROS                       Anesthesia Plan    ASA 3     general with block     intravenous induction     Anesthetic plan, all risks, benefits, and alternatives have been provided, discussed and informed consent has been obtained with: patient.  Use of blood products discussed with patient  Consented to blood products.         Lab Results   Component Value Date    WBC 12.33 (H) 05/09/2022    HGB 14.1 05/09/2022    HCT 41.8 05/09/2022    MCV 83.1 05/09/2022     05/09/2022       Lab Results   Component Value Date    GLUCOSE 109 (H) 01/18/2020    BUN 3 (L) 01/18/2020    CREATININE 0.68 06/06/2020    EGFRIFNONA >150 01/18/2020    BCR 7.3 01/18/2020    K 3.0 (L) 01/18/2020    CO2 20.8 (L) 01/18/2020    CALCIUM 8.8 01/18/2020    ALBUMIN 3.66 01/18/2020    LABIL2 1.5 10/19/2015    AST 18 06/06/2020    ALT 7 06/06/2020

## 2022-05-11 NOTE — OP NOTE
TOTAL LAPAROSCOPIC HYSTERECTOMY WITH DAVINCI ROBOT  Procedure Note    Janie Hollis  5/11/2022    Pre-op Diagnosis:   Pelvic pain  History of endometriosis determined by laparoscopy    Post-op Diagnosis:     Pelvic pain  History of endometriosis determined by laparoscopy    Procedure(s):  Total laparoscopic hysterectomy with bilateral salpingectomy with da Leelee robot    Surgeon(s):  Jose Cruz Ward DO    Anesthesia: General with Block    Estimated Blood Loss: minimal    Specimens:                Order Name Source Comment Collection Info Order Time   TISSUE EXAM, [facility] ([facility]) Uterus, Cervix, Bilateral Fallopian Tubes   Collected By: Jose Cruz Ward DO 5/11/2022 11:15 AM     How many specimens?   1          Release to patient   Immediate              Procedure:  The patient was taken to the operating room after informed written consent was obtained. General anesthesia was induced and the patient was placed in the dorsal lithotomy position. The patient was sterily prepared and draped and a Ortiz catheter was placed into the bladder. Next the JUAN LUIS manipulator was placed into the uterus with the KOH cup fitting snugly around the cervix. Once this was in place the surgeon was regloved and attention was made to the abdomen.     We made a 1 cm incision above the umbilicus. We inserted an OPTi view trocar into the abdomen under direct visualization using the laparoscope. Next we placed 8mm robotic trochars in the left lower quadrant and another one in the right lower quadrant we placed another trocar between the right-sided port and the midline port. All trochars used were 8mm robotic trochars. We then placed the patient in Trendelenburg and docked the robot. The remainder of the procedure was done under robotic guidance.    First we picked up the left fallopian tube and cauterized under it using the vessel sealer extend. We took the same dissection across the broad ligament and then went  across the utero-ovarian ligament and the round ligament. We did all the cauterizing using the PK forceps and cutting using monopolar scissors. We then took the dissection to the point where the broad ligament divided anteriorly and posteriorly. Anteriorly we took the dissection across to make a bladder flap. We took it posteriorly as well skeletonizing the uterine artery. We then doubly cauterized and cut the uterine artery. We then took the bladder down using sharp dissection and the monopolar scissors. We then took serial bites on each side of the uterus taking the cardinal and the uterosacral ligaments. Once we got all the attachments of the uterus down we made a colpotomy anteriorly and carried it all away around. We then delivered the specimen into the vagina and left it there to keep the pneumoperitoneum. Next we made sure everything was hemostatic. We closed the vagina using a 2-0 monocryl V lock suture making sure to get good bites of the vaginal cuff angles and attaching them to the uterosacral ligaments bilaterally. We then ran the rest of the vaginal cuff taking 1 cm bots of vagina. We then re-approximated the peritoneum over the vaginal cuff using the same suture. We irrigated once again and made sure everything was hemostatic. We them placed some Marcaine into the pelvis for postoperative pain control. We then surveyed the remainder of the abdomen and pelvis and it was grossly normal. We removed the CO2 gas and closed the skin incision with a 4-0 monocryl and placed surgical adhesive on the skin.    Findings: Nothing unusual patient did have fairly deep John Keesha windows.  There was a small amount of endometriosis in the posterior cul-de-sac that was cauterized.    Complications: none    Grafts or Implants: NA    Jose Cruz Ward,      Date: 5/11/2022  Time: 12:08 EDT

## 2022-05-11 NOTE — PLAN OF CARE
Goal Outcome Evaluation:  Plan of Care Reviewed With: patient        Progress: improving  Outcome Evaluation: tolerating liquids/ good urine output   rates pain acceptable with toradol and norco

## 2022-05-11 NOTE — ANESTHESIA PROCEDURE NOTES
Airway  Urgency: elective    Date/Time: 5/11/2022 10:14 AM  Airway not difficult    General Information and Staff    Patient location during procedure: OR  CRNA/CAA: Khushboo Villarreal CRNA    Indications and Patient Condition  Indications for airway management: airway protection    Preoxygenated: yes  MILS maintained throughout  Mask difficulty assessment: 1 - vent by mask    Final Airway Details  Final airway type: endotracheal airway      Successful airway: ETT  Cuffed: yes   Successful intubation technique: direct laryngoscopy  Facilitating devices/methods: intubating stylet  Endotracheal tube insertion site: oral  Blade: Marvin  Blade size: 3  ETT size (mm): 7.0  Cormack-Lehane Classification: grade I - full view of glottis  Placement verified by: chest auscultation and capnometry   Measured from: lips  ETT/EBT  to lips (cm): 19  Number of attempts at approach: 1  Assessment: lips, teeth, and gum same as pre-op and atraumatic intubation

## 2022-05-12 VITALS
BODY MASS INDEX: 17.87 KG/M2 | OXYGEN SATURATION: 98 % | DIASTOLIC BLOOD PRESSURE: 54 MMHG | SYSTOLIC BLOOD PRESSURE: 92 MMHG | WEIGHT: 91 LBS | TEMPERATURE: 97.6 F | RESPIRATION RATE: 18 BRPM | HEART RATE: 66 BPM | HEIGHT: 60 IN

## 2022-05-12 PROBLEM — Z48.89 ENCOUNTER FOR POSTOPERATIVE CARE: Status: ACTIVE | Noted: 2022-05-12

## 2022-05-12 LAB
DEPRECATED RDW RBC AUTO: 39.8 FL (ref 37–54)
ERYTHROCYTE [DISTWIDTH] IN BLOOD BY AUTOMATED COUNT: 13.2 % (ref 12.3–15.4)
HCT VFR BLD AUTO: 33.8 % (ref 34–46.6)
HGB BLD-MCNC: 11.4 G/DL (ref 12–15.9)
MCH RBC QN AUTO: 27.9 PG (ref 26.6–33)
MCHC RBC AUTO-ENTMCNC: 33.7 G/DL (ref 31.5–35.7)
MCV RBC AUTO: 82.8 FL (ref 79–97)
PLATELET # BLD AUTO: 252 10*3/MM3 (ref 140–450)
PMV BLD AUTO: 10.3 FL (ref 6–12)
RBC # BLD AUTO: 4.08 10*6/MM3 (ref 3.77–5.28)
WBC NRBC COR # BLD: 11.01 10*3/MM3 (ref 3.4–10.8)

## 2022-05-12 PROCEDURE — 85027 COMPLETE CBC AUTOMATED: CPT | Performed by: OBSTETRICS & GYNECOLOGY

## 2022-05-12 PROCEDURE — G0378 HOSPITAL OBSERVATION PER HR: HCPCS

## 2022-05-12 RX ORDER — HYDROCODONE BITARTRATE AND ACETAMINOPHEN 5; 325 MG/1; MG/1
1 TABLET ORAL EVERY 4 HOURS PRN
Qty: 15 TABLET | Refills: 0 | Status: SHIPPED | OUTPATIENT
Start: 2022-05-12 | End: 2022-05-15

## 2022-05-12 RX ORDER — DOCUSATE SODIUM 100 MG/1
100 CAPSULE, LIQUID FILLED ORAL 2 TIMES DAILY
Qty: 40 CAPSULE | Refills: 1 | Status: SHIPPED | OUTPATIENT
Start: 2022-05-12

## 2022-05-12 RX ORDER — IBUPROFEN 800 MG/1
800 TABLET ORAL EVERY 8 HOURS PRN
Qty: 40 TABLET | Refills: 1 | Status: SHIPPED | OUTPATIENT
Start: 2022-05-12

## 2022-05-12 RX ORDER — NALOXONE HCL 0.4 MG/ML
0.1 VIAL (ML) INJECTION
Start: 2022-05-12 | End: 2022-05-12

## 2022-05-12 RX ADMIN — HYDROCODONE BITARTRATE AND ACETAMINOPHEN 2 TABLET: 5; 325 TABLET ORAL at 06:43

## 2022-05-12 RX ADMIN — HYDROCODONE BITARTRATE AND ACETAMINOPHEN 2 TABLET: 5; 325 TABLET ORAL at 00:59

## 2022-05-12 RX ADMIN — IBUPROFEN 800 MG: 800 TABLET, FILM COATED ORAL at 06:02

## 2022-05-12 RX ADMIN — SIMETHICONE 80 MG: 80 TABLET, CHEWABLE ORAL at 06:43

## 2022-05-12 NOTE — DISCHARGE SUMMARY
Discharge Summary    Admit Date: 5/11/2022  8:20 AM    Admit Diagnosis: Pain in female pelvis [R10.2]    Date of Discharge:  5/12/2022    Discharge Diagnosis: Same    Hospital Course  Patient is a 31 y.o. female admitted secondary to Pelvic Pain.  She is S/P TLH with bilateral salpingectomy on 5/11/22. Patient tolerating a regular diet and ambulating without difficulty. She is voiding without issue. Denies vaginal bleeding. Will discharge to home today.     Vital Signs  Temp:  [97 °F (36.1 °C)-98.2 °F (36.8 °C)] 97.6 °F (36.4 °C)  Heart Rate:  [61-94] 66  Resp:  [11-20] 18  BP: ()/(51-84) 92/54    Physical Exam:       General Appearance:   Lungs:     Alert, cooperative, in no acute distress  Clear to auscultation,respirations regular, even and unlabored    Heart:  Regular rhythm and normal rate,no murmur, no gallop, no rub, no click   Abdomen:   Normal bowel sounds, no masses, no organomegaly, soft    non-tender, non-distended, no guarding, no rebound tenderness.  Lap incisions normal.   Extremities: Moves all extremities well, no edema, no cyanosis, no redness       Condition on Discharge:  Stable    Discharge Diet: Regular       Discharge Medications      New Medications      Instructions Start Date   docusate sodium 100 MG capsule  Commonly known as: Colace   100 mg, Oral, 2 Times Daily      HYDROcodone-acetaminophen 5-325 MG per tablet  Commonly known as: NORCO   1 tablet, Oral, Every 4 Hours PRN      ibuprofen 800 MG tablet  Commonly known as: ADVIL,MOTRIN   800 mg, Oral, Every 8 Hours PRN      naloxone 0.4 MG/ML injection  Commonly known as: NARCAN   0.1 mg, Intravenous, Every 5 Minutes PRN         Continue These Medications      Instructions Start Date   Baclofen 5 MG tablet   5 mg, Oral, Daily PRN      dicyclomine 10 MG capsule  Commonly known as: Bentyl   10 mg, Oral, 4 Times Daily PRN            Postoperative Care  Pelvic Pain  S/P hysterectomy      Follow-up Appointments  Patient will follow up in 2  weeks.   Return precautions reviewed.  Aftercare discussed at length.  Advised lifting precautions, no intercourse, no baths/swimming.  Advised to use Miralax OTC.       Madisyn Jones, HIEN  05/12/22  09:46 EDT

## 2022-05-12 NOTE — PLAN OF CARE
Problem: Adult Inpatient Plan of Care  Goal: Plan of Care Review  Outcome: Ongoing, Progressing  Flowsheets  Taken 5/12/2022 0933 by Leslye Waldrop, RN  Outcome Evaluation: aye regular diet.  voiding wo difficulty   ready for discharge  Taken 5/12/2022 0414 by Meena Gerber RN  Progress: improving  Goal: Patient-Specific Goal (Individualized)  Outcome: Ongoing, Progressing  Goal: Absence of Hospital-Acquired Illness or Injury  Outcome: Ongoing, Progressing  Intervention: Identify and Manage Fall Risk  Recent Flowsheet Documentation  Taken 5/12/2022 0810 by Leslye Waldrop, RN  Safety Promotion/Fall Prevention:   activity supervised   safety round/check completed  Intervention: Prevent Infection  Recent Flowsheet Documentation  Taken 5/12/2022 0810 by Leslye Waldrop, RN  Infection Prevention:   single patient room provided   visitors restricted/screened   rest/sleep promoted   personal protective equipment utilized   hand hygiene promoted  Goal: Optimal Comfort and Wellbeing  Outcome: Ongoing, Progressing  Goal: Readiness for Transition of Care  Outcome: Ongoing, Progressing   Goal Outcome Evaluation:              Outcome Evaluation: aye regular diet.  voiding wo difficulty   ready for discharge

## 2022-05-12 NOTE — PLAN OF CARE
Problem: Adult Inpatient Plan of Care  Goal: Plan of Care Review  Outcome: Ongoing, Progressing  Flowsheets  Taken 5/12/2022 0414 by Meena Gerber RN  Progress: improving  Outcome Evaluation: Pt. tolerating regular diet well, ambulating frequently in room, and voiding spontaneously w/o difficulty. Pain managed with scheduled Ibuprofen and PRN NORCO. No concerns present at this time, VSS, will continue to monitor.  Taken 5/11/2022 1642 by Liseth Aparicio RN  Plan of Care Reviewed With: patient  Goal: Patient-Specific Goal (Individualized)  Outcome: Ongoing, Progressing  Goal: Absence of Hospital-Acquired Illness or Injury  Outcome: Ongoing, Progressing  Intervention: Identify and Manage Fall Risk  Recent Flowsheet Documentation  Taken 5/12/2022 0400 by Meena Gerber RN  Safety Promotion/Fall Prevention: safety round/check completed  Taken 5/12/2022 0300 by Meena Gerber RN  Safety Promotion/Fall Prevention: safety round/check completed  Taken 5/12/2022 0200 by Meena Gerber RN  Safety Promotion/Fall Prevention: safety round/check completed  Taken 5/12/2022 0100 by Meena Gerber RN  Safety Promotion/Fall Prevention: safety round/check completed  Taken 5/12/2022 0000 by Meena Gerber RN  Safety Promotion/Fall Prevention: safety round/check completed  Taken 5/11/2022 2300 by Meena Gerber RN  Safety Promotion/Fall Prevention: safety round/check completed  Taken 5/11/2022 2200 by Meena Gerber RN  Safety Promotion/Fall Prevention: safety round/check completed  Taken 5/11/2022 2100 by Meena Gerber RN  Safety Promotion/Fall Prevention: safety round/check completed  Taken 5/11/2022 2000 by Meena Gerber RN  Safety Promotion/Fall Prevention: safety round/check completed  Taken 5/11/2022 1910 by Meena Gerber RN  Safety Promotion/Fall Prevention:   safety round/check completed   room organization consistent   nonskid shoes/slippers when out of bed   fall prevention program maintained   clutter free  environment maintained   assistive device/personal items within reach  Taken 5/11/2022 1900 by Meena Gerber RN  Safety Promotion/Fall Prevention: safety round/check completed  Intervention: Prevent Skin Injury  Recent Flowsheet Documentation  Taken 5/11/2022 1910 by Meena Gerber RN  Body Position: position changed independently  Intervention: Prevent and Manage VTE (Venous Thromboembolism) Risk  Recent Flowsheet Documentation  Taken 5/11/2022 1910 by Meena Gerber RN  Activity Management: bedrest  VTE Prevention/Management:   bilateral   dorsiflexion/plantar flexion performed   sequential compression devices on  Intervention: Prevent Infection  Recent Flowsheet Documentation  Taken 5/11/2022 1910 by Meena Gerber RN  Infection Prevention:   environmental surveillance performed   equipment surfaces disinfected   personal protective equipment utilized   hand hygiene promoted   rest/sleep promoted   single patient room provided   visitors restricted/screened  Goal: Optimal Comfort and Wellbeing  Outcome: Ongoing, Progressing  Intervention: Monitor Pain and Promote Comfort  Recent Flowsheet Documentation  Taken 5/11/2022 1910 by Meena Gerber RN  Pain Management Interventions:   pain management plan reviewed with patient/caregiver   no interventions per patient request  Intervention: Provide Person-Centered Care  Recent Flowsheet Documentation  Taken 5/11/2022 1910 by Meena Gerber RN  Trust Relationship/Rapport:   care explained   choices provided   emotional support provided   empathic listening provided   questions answered   questions encouraged   reassurance provided   thoughts/feelings acknowledged  Goal: Readiness for Transition of Care  Outcome: Ongoing, Progressing     Problem: Bleeding (Hysterectomy)  Goal: Absence of Bleeding  Outcome: Ongoing, Progressing     Problem: Bowel Motility Impaired (Hysterectomy)  Goal: Effective Bowel Elimination  Outcome: Ongoing, Progressing  Intervention: Enhance Bowel  Motility and Elimination  Recent Flowsheet Documentation  Taken 5/11/2022 1910 by Meena Gerber RN  Bowel Motility Enhancement:   ambulation promoted   fluid intake encouraged   oral intake encouraged     Problem: Fluid and Electrolyte Imbalance (Hysterectomy)  Goal: Fluid and Electrolyte Balance  Outcome: Ongoing, Progressing     Problem: Infection (Hysterectomy)  Goal: Absence of Infection Signs and Symptoms  Outcome: Ongoing, Progressing     Problem: Ongoing Anesthesia Effects (Hysterectomy)  Goal: Anesthesia/Sedation Recovery  Outcome: Ongoing, Progressing  Intervention: Optimize Anesthesia Recovery  Recent Flowsheet Documentation  Taken 5/12/2022 0400 by Meena Gerber RN  Safety Promotion/Fall Prevention: safety round/check completed  Taken 5/12/2022 0300 by Meena Gerber RN  Safety Promotion/Fall Prevention: safety round/check completed  Taken 5/12/2022 0200 by Meena Gerber RN  Safety Promotion/Fall Prevention: safety round/check completed  Taken 5/12/2022 0100 by Meena Gerber RN  Safety Promotion/Fall Prevention: safety round/check completed  Taken 5/12/2022 0000 by Meena Gerber RN  Safety Promotion/Fall Prevention: safety round/check completed  Taken 5/11/2022 2300 by Meena Gerber RN  Safety Promotion/Fall Prevention: safety round/check completed  Taken 5/11/2022 2200 by Meena Gerber RN  Safety Promotion/Fall Prevention: safety round/check completed  Taken 5/11/2022 2100 by Meena Gebrer RN  Safety Promotion/Fall Prevention: safety round/check completed  Taken 5/11/2022 2000 by Meena Gerber RN  Safety Promotion/Fall Prevention: safety round/check completed  Taken 5/11/2022 1910 by Meena Gerber RN  Patient Tolerance (IS): good  Safety Promotion/Fall Prevention:   safety round/check completed   room organization consistent   nonskid shoes/slippers when out of bed   fall prevention program maintained   clutter free environment maintained   assistive device/personal items within  reach  Administration (IS):   instruction provided, follow-up   proper technique demonstrated   self-administered  Level Incentive Spirometer (mL): 2000  Number of Repetitions (IS): 5  Taken 5/11/2022 1900 by Meena Gerber RN  Safety Promotion/Fall Prevention: safety round/check completed     Problem: Pain (Hysterectomy)  Goal: Acceptable Pain Control  Outcome: Ongoing, Progressing  Intervention: Prevent or Manage Pain  Recent Flowsheet Documentation  Taken 5/11/2022 1910 by Meena Gerber RN  Pain Management Interventions:   pain management plan reviewed with patient/caregiver   no interventions per patient request  Diversional Activities:   smartphone   television     Problem: Postoperative Nausea and Vomiting (Hysterectomy)  Goal: Nausea and Vomiting Relief  Outcome: Ongoing, Progressing  Intervention: Prevent or Manage Nausea and Vomiting  Recent Flowsheet Documentation  Taken 5/11/2022 1910 by Meena Gerber RN  Nausea/Vomiting Interventions: (pt. denies n/v) other (see comments)     Problem: Postoperative Urinary Retention (Hysterectomy)  Goal: Effective Urinary Elimination  Outcome: Ongoing, Progressing  Intervention: Monitor and Manage Urinary Retention  Recent Flowsheet Documentation  Taken 5/11/2022 1910 by Meena Gerber RN  Urinary Elimination Promotion: catheter patency maintained     Problem: Respiratory Compromise (Hysterectomy)  Goal: Effective Oxygenation and Ventilation  Outcome: Ongoing, Progressing  Intervention: Optimize Oxygenation and Ventilation  Recent Flowsheet Documentation  Taken 5/11/2022 1910 by Meena Gerber RN  Head of Bed (HOB) Positioning: HOB elevated   Goal Outcome Evaluation:           Progress: improving  Outcome Evaluation: Pt. tolerating regular diet well, ambulating frequently in room, and voiding spontaneously w/o difficulty. Pain managed with scheduled Ibuprofen and PRN NORCO. No concerns present at this time, VSS, will continue to monitor.

## 2022-05-13 LAB — REF LAB TEST METHOD: NORMAL

## 2022-06-08 ENCOUNTER — HOSPITAL ENCOUNTER (EMERGENCY)
Facility: HOSPITAL | Age: 32
Discharge: HOME | End: 2022-06-09
Attending: EMERGENCY MEDICINE | Admitting: EMERGENCY MEDICINE

## 2022-06-08 ENCOUNTER — APPOINTMENT (OUTPATIENT)
Dept: CT IMAGING | Facility: HOSPITAL | Age: 32
End: 2022-06-08

## 2022-06-08 VITALS
WEIGHT: 94 LBS | HEART RATE: 86 BPM | DIASTOLIC BLOOD PRESSURE: 56 MMHG | BODY MASS INDEX: 18.46 KG/M2 | TEMPERATURE: 98 F | OXYGEN SATURATION: 100 % | HEIGHT: 60 IN | RESPIRATION RATE: 18 BRPM | SYSTOLIC BLOOD PRESSURE: 108 MMHG

## 2022-06-08 DIAGNOSIS — N93.9 VAGINAL BLEEDING: Primary | ICD-10-CM

## 2022-06-08 LAB
ALBUMIN SERPL-MCNC: 4.57 G/DL (ref 3.5–5.2)
ALBUMIN/GLOB SERPL: 1.7 G/DL
ALP SERPL-CCNC: 103 U/L (ref 39–117)
ALT SERPL W P-5'-P-CCNC: 63 U/L (ref 1–33)
ANION GAP SERPL CALCULATED.3IONS-SCNC: 11 MMOL/L (ref 5–15)
APTT PPP: 28.1 SECONDS (ref 26.5–34.5)
AST SERPL-CCNC: 35 U/L (ref 1–32)
BACTERIA UR QL AUTO: ABNORMAL /HPF
BASOPHILS # BLD AUTO: 0.08 10*3/MM3 (ref 0–0.2)
BASOPHILS NFR BLD AUTO: 0.8 % (ref 0–1.5)
BILIRUB SERPL-MCNC: 0.4 MG/DL (ref 0–1.2)
BILIRUB UR QL STRIP: NEGATIVE
BUN SERPL-MCNC: 8 MG/DL (ref 6–20)
BUN/CREAT SERPL: 13.6 (ref 7–25)
CALCIUM SPEC-SCNC: 9.6 MG/DL (ref 8.6–10.5)
CHLORIDE SERPL-SCNC: 107 MMOL/L (ref 98–107)
CLARITY UR: CLEAR
CO2 SERPL-SCNC: 24 MMOL/L (ref 22–29)
COLOR UR: YELLOW
CREAT SERPL-MCNC: 0.59 MG/DL (ref 0.57–1)
DEPRECATED RDW RBC AUTO: 42.9 FL (ref 37–54)
EGFRCR SERPLBLD CKD-EPI 2021: 125.3 ML/MIN/1.73
EOSINOPHIL # BLD AUTO: 0.18 10*3/MM3 (ref 0–0.4)
EOSINOPHIL NFR BLD AUTO: 1.8 % (ref 0.3–6.2)
ERYTHROCYTE [DISTWIDTH] IN BLOOD BY AUTOMATED COUNT: 14.1 % (ref 12.3–15.4)
GLOBULIN UR ELPH-MCNC: 2.7 GM/DL
GLUCOSE SERPL-MCNC: 99 MG/DL (ref 65–99)
GLUCOSE UR STRIP-MCNC: NEGATIVE MG/DL
HCT VFR BLD AUTO: 39.6 % (ref 34–46.6)
HGB BLD-MCNC: 13.5 G/DL (ref 12–15.9)
HGB UR QL STRIP.AUTO: ABNORMAL
HOLD SPECIMEN: NORMAL
HOLD SPECIMEN: NORMAL
HYALINE CASTS UR QL AUTO: ABNORMAL /LPF
IMM GRANULOCYTES # BLD AUTO: 0.02 10*3/MM3 (ref 0–0.05)
IMM GRANULOCYTES NFR BLD AUTO: 0.2 % (ref 0–0.5)
INR PPP: 0.98 (ref 0.9–1.1)
KETONES UR QL STRIP: NEGATIVE
LEUKOCYTE ESTERASE UR QL STRIP.AUTO: NEGATIVE
LIPASE SERPL-CCNC: 49 U/L (ref 13–60)
LYMPHOCYTES # BLD AUTO: 1.9 10*3/MM3 (ref 0.7–3.1)
LYMPHOCYTES NFR BLD AUTO: 19.4 % (ref 19.6–45.3)
MCH RBC QN AUTO: 28.4 PG (ref 26.6–33)
MCHC RBC AUTO-ENTMCNC: 34.1 G/DL (ref 31.5–35.7)
MCV RBC AUTO: 83.4 FL (ref 79–97)
MONOCYTES # BLD AUTO: 0.55 10*3/MM3 (ref 0.1–0.9)
MONOCYTES NFR BLD AUTO: 5.6 % (ref 5–12)
NEUTROPHILS NFR BLD AUTO: 7.07 10*3/MM3 (ref 1.7–7)
NEUTROPHILS NFR BLD AUTO: 72.2 % (ref 42.7–76)
NITRITE UR QL STRIP: NEGATIVE
NRBC BLD AUTO-RTO: 0 /100 WBC (ref 0–0.2)
PH UR STRIP.AUTO: 5.5 [PH] (ref 5–8)
PLATELET # BLD AUTO: 304 10*3/MM3 (ref 140–450)
PMV BLD AUTO: 10.4 FL (ref 6–12)
POTASSIUM SERPL-SCNC: 3.9 MMOL/L (ref 3.5–5.2)
PROT SERPL-MCNC: 7.3 G/DL (ref 6–8.5)
PROT UR QL STRIP: NEGATIVE
PROTHROMBIN TIME: 13.1 SECONDS (ref 12.1–14.7)
RBC # BLD AUTO: 4.75 10*6/MM3 (ref 3.77–5.28)
RBC # UR STRIP: ABNORMAL /HPF
REF LAB TEST METHOD: ABNORMAL
SODIUM SERPL-SCNC: 142 MMOL/L (ref 136–145)
SP GR UR STRIP: 1.02 (ref 1–1.03)
SQUAMOUS #/AREA URNS HPF: ABNORMAL /HPF
UROBILINOGEN UR QL STRIP: ABNORMAL
WBC # UR STRIP: ABNORMAL /HPF
WBC NRBC COR # BLD: 9.8 10*3/MM3 (ref 3.4–10.8)
WHOLE BLOOD HOLD COAG: NORMAL
WHOLE BLOOD HOLD SPECIMEN: NORMAL

## 2022-06-08 PROCEDURE — 85610 PROTHROMBIN TIME: CPT | Performed by: EMERGENCY MEDICINE

## 2022-06-08 PROCEDURE — 85025 COMPLETE CBC W/AUTO DIFF WBC: CPT | Performed by: EMERGENCY MEDICINE

## 2022-06-08 PROCEDURE — 36415 COLL VENOUS BLD VENIPUNCTURE: CPT

## 2022-06-08 PROCEDURE — 85730 THROMBOPLASTIN TIME PARTIAL: CPT | Performed by: EMERGENCY MEDICINE

## 2022-06-08 PROCEDURE — 99283 EMERGENCY DEPT VISIT LOW MDM: CPT

## 2022-06-08 PROCEDURE — 74177 CT ABD & PELVIS W/CONTRAST: CPT

## 2022-06-08 PROCEDURE — 81001 URINALYSIS AUTO W/SCOPE: CPT | Performed by: EMERGENCY MEDICINE

## 2022-06-08 PROCEDURE — 80053 COMPREHEN METABOLIC PANEL: CPT | Performed by: EMERGENCY MEDICINE

## 2022-06-08 PROCEDURE — 0 IOPAMIDOL PER 1 ML: Performed by: EMERGENCY MEDICINE

## 2022-06-08 PROCEDURE — 96360 HYDRATION IV INFUSION INIT: CPT

## 2022-06-08 PROCEDURE — 83690 ASSAY OF LIPASE: CPT | Performed by: EMERGENCY MEDICINE

## 2022-06-08 RX ORDER — HYDROCODONE BITARTRATE AND ACETAMINOPHEN 5; 325 MG/1; MG/1
1 TABLET ORAL ONCE
Status: COMPLETED | OUTPATIENT
Start: 2022-06-08 | End: 2022-06-08

## 2022-06-08 RX ORDER — SODIUM CHLORIDE 0.9 % (FLUSH) 0.9 %
10 SYRINGE (ML) INJECTION AS NEEDED
Status: DISCONTINUED | OUTPATIENT
Start: 2022-06-08 | End: 2022-06-09 | Stop reason: HOSPADM

## 2022-06-08 RX ADMIN — HYDROCODONE BITARTRATE AND ACETAMINOPHEN 1 TABLET: 5; 325 TABLET ORAL at 23:45

## 2022-06-08 RX ADMIN — SODIUM CHLORIDE 1000 ML: 9 INJECTION, SOLUTION INTRAVENOUS at 22:52

## 2022-06-08 RX ADMIN — IOPAMIDOL 60 ML: 755 INJECTION, SOLUTION INTRAVENOUS at 23:28

## 2022-06-09 ENCOUNTER — APPOINTMENT (OUTPATIENT)
Dept: ULTRASOUND IMAGING | Facility: HOSPITAL | Age: 32
End: 2022-06-09

## 2022-06-09 PROCEDURE — 76856 US EXAM PELVIC COMPLETE: CPT

## 2022-06-09 RX ORDER — IBUPROFEN 800 MG/1
800 TABLET ORAL
Qty: 21 TABLET | Refills: 0 | Status: SHIPPED | OUTPATIENT
Start: 2022-06-09 | End: 2022-06-16

## 2022-06-09 NOTE — ED NOTES
Dr Ward performed hysterectomy on pt 4 weeks ago, pt has not experienced any bleeding post op until today. States blood is bright red without clots, with cramping.

## 2022-06-09 NOTE — CONSULTS
[facility]  Consult    Patient Name: Janie Hollis  : 1990  MRN: [id number]  Primary Care Physician: Kymberly Khanna APRN  Requesting Physician: Adiel Sanchez MD  Date of admission: 2022      Subjective   Subjective     Chief Complaint:  Vaginal bleeding    HPI:  Janie Hollis is a 31 y.o. female s/p RA-TLH/BS for endometriosis on 22.   Pt states that bleeding had stopped almost immediately after procedure. This morning, she began having brown spotting and then it progressed to heavy bright red bleeding at 1730. Pt states that she is very nervous as she had friend who presented with similar symptoms and was found to have vaginal cuff dehisence. Pt has had some cramping, but significant pain. She denies abnormal vaginal discharge, fevers/chills. Denies intercourse. Denies nausea/vomiting. Does have daily diarrhea which she attributes to IBS and most recently had episode of nonbloody diarrhea while in ED this evening.   She is  with hx of CSx2.     Review of Systems       Personal History     Past Medical History:   Diagnosis Date   • Anxiety    • Endometriosis 2016    Status post multiple laparoscopies   • Oral thrush 2012    hospitalized x 2 weeks   • Pain        Past Surgical History:   Procedure Laterality Date   •  SECTION N/A 2020    Procedure: Repeat LTCS (1 layer closure);  Surgeon: Yassine Prince MD;  Location: [facility] LABOR DELIVERY;  Service: Obstetrics/Gynecology;  Laterality: N/A;   •  SECTION PRIMARY  2013    LTCS (2 layer)   • D & C AND LAPAROSCOPY N/A 2018    [facility]; Dr. Mora; endometrial pathology benign; laparoscopic findings consistent with endometriosis   • DIAGNOSTIC LAPAROSCOPY      [facility]; Dr. Mora; findings consistent with endometriosis   • DIAGNOSTIC LAPAROSCOPY N/A 2016    [facility]; Dr. Mora; diagnostic laparoscopy with fulguration of endometriosis   • DIAGNOSTIC  LAPAROSCOPY N/A 7/2/2019    [facility]; Dr. Mora; findings consistent with endometriosis   • ENDOSCOPY  2016    Normal   • GANGLION CYST EXCISION Right 2014   • SKIN BIOPSY  2017    on abdomen - all benign   • TOTAL LAPAROSCOPIC HYSTERECTOMY N/A 5/11/2022    Procedure: ROBOTIC TOTAL LAPAROSCOPIC HYSTERECTOMY SALPINGECTOMY,;  Surgeon: Jose Cruz Ward DO;  Location: [facility];  Service: Robotics - Da[employer]inci;  Laterality: N/A;       Family History: family history includes Diabetes in her sister; Heart disease in her maternal grandmother; Hypertension in her maternal grandmother. Otherwise pertinent FHx was reviewed and unremarkable.     Social History:  reports that she has never smoked. She has never used smokeless tobacco. She reports that she does not drink alcohol and does not use drugs.    Medications:    Available home medication information reviewed.    Current Facility-Administered Medications:   •  sodium chloride 0.9 % flush 10 mL, 10 mL, Intravenous, PRN, Adiel Sanchez MD    Current Outpatient Medications:   •  Baclofen 5 MG tablet, Take 5 mg by mouth Daily As Needed., Disp: , Rfl:   •  dicyclomine (Bentyl) 10 MG capsule, Take 1 capsule by mouth 4 (Four) Times a Day As Needed (For diarrhea)., Disp: 120 capsule, Rfl: 3  •  docusate sodium (Colace) 100 MG capsule, Take 1 capsule by mouth 2 (Two) Times a Day., Disp: 40 capsule, Rfl: 1  •  ibuprofen (ADVIL,MOTRIN) 800 MG tablet, Take 1 tablet by mouth Every 8 (Eight) Hours As Needed for Mild Pain ., Disp: 40 tablet, Rfl: 1  •  ibuprofen (ADVIL,MOTRIN) 800 MG tablet, Take 1 tablet by mouth 3 (Three) Times a Day With Meals for 7 days., Disp: 21 tablet, Rfl: 0      Allergies   Allergen Reactions   • Acetaminophen Other (See Comments)     Tachycardia   • Oxycodone Hcl Swelling   • Codeine Other (See Comments)     Increased Heart Rate   • Wound Dressing Adhesive Rash     Pt reports bad rash after having adhesive tape from surgery       Objective    Objective     Vital Signs:   Temp:  [98 °F (36.7 °C)] 98 °F (36.7 °C)  Heart Rate:  [86] 86  Resp:  [18] 18  BP: (108-122)/(56-90) 108/56        Physical Exam   Gen: NAD  CV: RRR  Resp: CTAB   Abd: mildly tender to palpation in midline lower abd, no rebound/guarding  Extr: no edema  : small amount of dark red blood in vault with small clot. No active bleeding from vaginal cuff. No visualized defects or discharge. Cuff intact to light palpation.     Results Reviewed:    Results from last 7 days   Lab Units 06/08/22 2020   WBC 10*3/mm3 9.80   HEMOGLOBIN g/dL 13.5   HEMATOCRIT % 39.6   PLATELETS 10*3/mm3 304   INR  0.98     Results from last 7 days   Lab Units 06/08/22 2020   SODIUM mmol/L 142   POTASSIUM mmol/L 3.9   CHLORIDE mmol/L 107   CO2 mmol/L 24.0   BUN mg/dL 8   CREATININE mg/dL 0.59   GLUCOSE mg/dL 99   CALCIUM mg/dL 9.6   ALT (SGPT) U/L 63*   AST (SGOT) U/L 35*     Estimated Creatinine Clearance: 94.6 mL/min (by C-G formula based on SCr of 0.59 mg/dL).  Brief Urine Lab Results  (Last result in the past 365 days)      Color   Clarity   Blood   Leuk Est   Nitrite   Protein   CREAT   Urine HCG        06/08/22 2022 Yellow   Clear   Large (3+)   Negative   Negative   Negative               Imaging Results (Last 24 Hours)     Procedure Component Value Units Date/Time    US Pelvis Complete [[id number]] Collected: 06/09/22 0115     Updated: 06/09/22 0117    Narrative:      US Pelvis Non-OB Comp    INDICATION:   Abnormal uterine or vaginal bleeding, 5.3 automated left adnexal cyst seen on CT scan earlier today.    COMPARISON:   CT abdomen pelvis earlier today    TECHNIQUE:  Has abdominal imaging was performed.    FINDINGS:  The uterus has been removed. Right ovary is 4.0 cm in maximum dimension. Internal flow is documented.. Left ovary measures 6 cm in diameter. It contains a cyst measuring at least 5 cm in diameter. Flow is demonstrated within the ovarian tissue.      Impression:      Roughly 5 cm minimally  "complex left ovarian cyst    Both ovaries demonstrate normal blood flow    The uterus has been removed    Signer Name: Brandon Canada MD   Signed: 6/9/2022 1:15 AM   Workstation Name: [facility]    Radiology [facility]    CT Abdomen Pelvis With Contrast [[id number]] Collected: 06/09/22 0003     Updated: 06/09/22 0005    Narrative:      CT Abdomen Pelvis W    INDICATION:   Vaginal bleeding for one month since hysterectomy    TECHNIQUE:   CT of the abdomen and pelvis with IV contrast. Coronal and sagittal reconstructions were obtained.  Radiation dose reduction techniques included automated exposure control or exposure modulation based on body size. Count of known CT and cardiac nuc med  studies performed in previous 12 months: 0.     COMPARISON:   None available.    FINDINGS:  Abdomen: Lung bases are clear. Liver, spleen, gallbladder, pancreas, adrenal glands and kidneys are normal in appearance. The aorta is normal in size. There is no adenopathy. Bowel is normal.    Pelvis: Uterus is been removed. Bladder is normal. Left adnexa has a 5.3 cm thin-walled low-density lesion. The lesion measures 20 Hounsfield units.      Impression:      5.3 cm left adnexal cyst. This is likely an ovarian cyst and is likely physiologic. Follow-up pelvic ultrasound in 6 weeks is recommended.    Prior hysterectomy    Otherwise normal          Signer Name: Brandon Canada MD   Signed: 6/9/2022 12:03 AM   Workstation Name: [facility]        Pelvic US  \"FINDINGS:  The uterus has been removed. Right ovary is 4.0 cm in maximum dimension. Internal flow is documented.. Left ovary measures 6 cm in diameter. It contains a cyst measuring at least 5 cm in diameter. Flow is demonstrated within the ovarian tissue.     IMPRESSION:  Roughly 5 cm minimally complex left ovarian cyst     Both ovaries demonstrate normal blood flow     The uterus has been removed\"       Assessment & Plan   Assessment / Plan "     Patient Summary:  Janie Hollis is a 31 y.o. female presenting s/p RA-TLH/BS on 5/11/22 for acute onset vaginal bleeding. Bleeding mild on exam with no active bleeding from cuff and no defects noted in cuff. CT with simple appearing left adnexal cyst with no hematoma/abscess and US showing preserved blood flow to left ovary.   - Pt stable for discharge home  - Recc ongoing pelvic rest  - RTC in 1-2 days for follow-up for bleeding  - Repeat US in 4-8 weeks as outpatient to f/u ovarian cyst   - Pt instructed to return for recurrent bleeding or worsening pain.     Active and Resolved Problems:  There are no hospital problems to display for this patient.                Electronically signed by Lisa Barcenas MD, 06/09/22, 01:24 EDT

## 2022-06-09 NOTE — ED PROVIDER NOTES
Subjective   31-year-old female past medical history of endometriosis, hysterectomy approximately 1 month ago, had spotting the first day nothing since no vaginal intercourse, had a follow-up last week said she can start lifting been lifting her twins today she had some brown discharge then followed by bright red blood.  States a friend ended up having the same thing and having a cuff tear.  She thinks she may have the same thing.  Been having 1 full pad of bleeding approximate every 2 hours for the past few hours.          Review of Systems   Constitutional: Negative for chills and fever.   Respiratory: Negative for shortness of breath.    Cardiovascular: Negative for chest pain.   Gastrointestinal: Negative for abdominal pain, nausea and vomiting.   Genitourinary: Positive for vaginal bleeding. Negative for dysuria and vaginal pain.   Musculoskeletal: Negative for myalgias and neck stiffness.   Skin: Negative for rash.   Neurological: Negative for headaches.   All other systems reviewed and are negative.      Past Medical History:   Diagnosis Date   • Anxiety    • Endometriosis 2016    Status post multiple laparoscopies   • Oral thrush 2012    hospitalized x 2 weeks   • Pain        Allergies   Allergen Reactions   • Acetaminophen Other (See Comments)     Tachycardia   • Oxycodone Hcl Swelling   • Codeine Other (See Comments)     Increased Heart Rate   • Wound Dressing Adhesive Rash     Pt reports bad rash after having adhesive tape from surgery       Past Surgical History:   Procedure Laterality Date   •  SECTION N/A 2020    Procedure: Repeat LTCS (1 layer closure);  Surgeon: Yassine Prince MD;  Location: [facility] LABOR DELIVERY;  Service: Obstetrics/Gynecology;  Laterality: N/A;   •  SECTION PRIMARY  2013    LTCS (2 layer)   • D & C AND LAPAROSCOPY N/A 2018    [facility]; Dr. Mora; endometrial pathology benign; laparoscopic findings consistent with endometriosis   •  DIAGNOSTIC LAPAROSCOPY  2017    [facility]; Dr. Mora; findings consistent with endometriosis   • DIAGNOSTIC LAPAROSCOPY N/A 12/13/2016    [facility]; Dr. Mora; diagnostic laparoscopy with fulguration of endometriosis   • DIAGNOSTIC LAPAROSCOPY N/A 7/2/2019    [facility]; Dr. Mora; findings consistent with endometriosis   • ENDOSCOPY  2016    Normal   • GANGLION CYST EXCISION Right 2014   • SKIN BIOPSY  2017    on abdomen - all benign   • TOTAL LAPAROSCOPIC HYSTERECTOMY N/A 5/11/2022    Procedure: ROBOTIC TOTAL LAPAROSCOPIC HYSTERECTOMY SALPINGECTOMY,;  Surgeon: Jose Cruz Ward DO;  Location: [facility];  Service: Robotics - DaVinci;  Laterality: N/A;       Family History   Problem Relation Age of Onset   • Diabetes Sister    • Heart disease Maternal Grandmother    • Hypertension Maternal Grandmother        Social History     Socioeconomic History   • Marital status:    Tobacco Use   • Smoking status: Never Smoker   • Smokeless tobacco: Never Used   Vaping Use   • Vaping Use: Never used   Substance and Sexual Activity   • Alcohol use: No   • Drug use: No   • Sexual activity: Yes     Partners: Male     Birth control/protection: None           Objective   Physical Exam  Vitals and nursing note reviewed.   Constitutional:       General: She is not in acute distress.  HENT:      Head: Normocephalic and atraumatic.      Mouth/Throat:      Mouth: Mucous membranes are moist.   Eyes:      Extraocular Movements: Extraocular movements intact.      Pupils: Pupils are equal, round, and reactive to light.   Cardiovascular:      Rate and Rhythm: Normal rate.   Pulmonary:      Effort: Pulmonary effort is normal.      Breath sounds: Normal breath sounds.   Abdominal:      General: Abdomen is flat.   Genitourinary:     Comments: Exam deferred, done by GYN  Skin:     General: Skin is warm and dry.      Capillary Refill: Capillary refill takes less than 2 seconds.      Findings: No  rash.   Neurological:      General: No focal deficit present.      Mental Status: She is alert and oriented to person, place, and time.         Procedures           ED Course                                                 MDM  Number of Diagnoses or Management Options     Amount and/or Complexity of Data Reviewed  Clinical lab tests: reviewed  Tests in the radiology section of CPT®: reviewed        Final diagnoses:   Vaginal bleeding       ED Disposition  ED Disposition     ED Disposition   Discharge    Condition   Stable    Comment   --             BobbytariAndrewis Festus, DO  [address]  [address]  [place] [address]  [phone]    Go to   tomorrow    [facility] Emergency Department  [address] [place] [id number] [phone]  Go to   If symptoms worsen         Medication List      Changed    * ibuprofen 800 MG tablet  Commonly known as: ADVIL,MOTRIN  Take 1 tablet by mouth Every 8 (Eight) Hours As Needed for Mild Pain .  What changed: Another medication with the same name was added. Make sure you understand how and when to take each.     * ibuprofen 800 MG tablet  Commonly known as: ADVIL,MOTRIN  Take 1 tablet by mouth 3 (Three) Times a Day With Meals for 7 days.  What changed: You were already taking a medication with the same name, and this prescription was added. Make sure you understand how and when to take each.         * This list has 2 medication(s) that are the same as other medications prescribed for you. Read the directions carefully, and ask your doctor or other care provider to review them with you.               Where to Get Your Medications      These medications were sent to [facility] - [address] [place] [address]    Hours: 8AM-6PM Mon-Fri Phone: [phone]   · ibuprofen 800 MG tablet          Adiel Sanchez MD  06/09/22 0017

## 2022-07-01 NOTE — PROGRESS NOTES
Subjective   Chief Complaint   Patient presents with   • Post-op     Janie Hollis is a 29 y.o. year old  presenting to be seen for her post-operative visit.  Currently she reports no problems with eating, bowel movements, voiding, or wound drainage and pain is well controlled.    There was no pathology result associated with Janie's recent procedure.      OTHER THINGS SHE WANTS TO DISCUSS TODAY:  Nothing else    The following portions of the patient's history were reviewed and updated as appropriate:current medications and allergies       Objective   /70   Resp 14   Wt 47.2 kg (104 lb)   LMP 2019 Comment: pt states that she had a period last week and then started again 2 days ago.  Breastfeeding Yes   BMI 20.31 kg/m²     General:  well developed; well nourished  no acute distress   Abdomen: soft, non-tender; no masses  no umbilical or inguinal hernias are present  no hepato-splenomegaly  incision is clean, dry, intact and without drainage   Pelvis: Not performed.          Assessment   1. S/P      Plan   1. OK to drive  2. OK to lift  3. Nothing per vagina still until 6 weeks after her procedure  4. The importance of keeping all planned follow-up and taking all medications as prescribed was emphasized.  5. Follow up for postpartum visit 1 month.         This note was electronically signed.    Yassine Pricne M.D.  2020    Note: Speech recognition transcription software may have been used to create portions of this document.  An attempt at proofreading has been made but errors in transcription could still be present.   150 feet

## 2023-03-28 ENCOUNTER — TRANSCRIBE ORDERS (OUTPATIENT)
Dept: ADMINISTRATIVE | Facility: HOSPITAL | Age: 33
End: 2023-03-28
Payer: COMMERCIAL

## 2023-03-28 ENCOUNTER — LAB (OUTPATIENT)
Dept: LAB | Facility: HOSPITAL | Age: 33
End: 2023-03-28
Payer: COMMERCIAL

## 2023-03-28 DIAGNOSIS — Z13.220 SCREENING FOR LIPOID DISORDERS: ICD-10-CM

## 2023-03-28 DIAGNOSIS — G47.00 PERSISTENT DISORDER OF INITIATING OR MAINTAINING SLEEP: Primary | ICD-10-CM

## 2023-03-28 DIAGNOSIS — F41.9 ANXIETY: ICD-10-CM

## 2023-03-28 DIAGNOSIS — G47.00 PERSISTENT DISORDER OF INITIATING OR MAINTAINING SLEEP: ICD-10-CM

## 2023-03-28 LAB
ALBUMIN SERPL-MCNC: 4.4 G/DL (ref 3.5–5.2)
ALBUMIN/GLOB SERPL: 1.4 G/DL
ALP SERPL-CCNC: 105 U/L (ref 39–117)
ALT SERPL W P-5'-P-CCNC: 27 U/L (ref 1–33)
ANION GAP SERPL CALCULATED.3IONS-SCNC: 8.9 MMOL/L (ref 5–15)
AST SERPL-CCNC: 22 U/L (ref 1–32)
BACTERIA UR QL AUTO: NORMAL /HPF
BASOPHILS # BLD AUTO: 0.09 10*3/MM3 (ref 0–0.2)
BASOPHILS NFR BLD AUTO: 1.3 % (ref 0–1.5)
BILIRUB SERPL-MCNC: 0.4 MG/DL (ref 0–1.2)
BUN SERPL-MCNC: 7 MG/DL (ref 6–20)
BUN/CREAT SERPL: 13.2 (ref 7–25)
CALCIUM SPEC-SCNC: 9.4 MG/DL (ref 8.6–10.5)
CHLORIDE SERPL-SCNC: 104 MMOL/L (ref 98–107)
CHOLEST SERPL-MCNC: 191 MG/DL (ref 0–200)
CO2 SERPL-SCNC: 24.1 MMOL/L (ref 22–29)
CREAT SERPL-MCNC: 0.53 MG/DL (ref 0.57–1)
DEPRECATED RDW RBC AUTO: 39.2 FL (ref 37–54)
EGFRCR SERPLBLD CKD-EPI 2021: 127.8 ML/MIN/1.73
EOSINOPHIL # BLD AUTO: 0.09 10*3/MM3 (ref 0–0.4)
EOSINOPHIL NFR BLD AUTO: 1.3 % (ref 0.3–6.2)
ERYTHROCYTE [DISTWIDTH] IN BLOOD BY AUTOMATED COUNT: 13.2 % (ref 12.3–15.4)
GLOBULIN UR ELPH-MCNC: 3.2 GM/DL
GLUCOSE SERPL-MCNC: 83 MG/DL (ref 65–99)
HCT VFR BLD AUTO: 43.2 % (ref 34–46.6)
HDLC SERPL-MCNC: 45 MG/DL (ref 40–60)
HGB BLD-MCNC: 14.9 G/DL (ref 12–15.9)
HYALINE CASTS UR QL AUTO: NORMAL /LPF
IMM GRANULOCYTES # BLD AUTO: 0.03 10*3/MM3 (ref 0–0.05)
IMM GRANULOCYTES NFR BLD AUTO: 0.4 % (ref 0–0.5)
LDLC SERPL CALC-MCNC: 124 MG/DL (ref 0–100)
LDLC/HDLC SERPL: 2.71 {RATIO}
LYMPHOCYTES # BLD AUTO: 2.11 10*3/MM3 (ref 0.7–3.1)
LYMPHOCYTES NFR BLD AUTO: 30.2 % (ref 19.6–45.3)
MCH RBC QN AUTO: 28.5 PG (ref 26.6–33)
MCHC RBC AUTO-ENTMCNC: 34.5 G/DL (ref 31.5–35.7)
MCV RBC AUTO: 82.6 FL (ref 79–97)
MONOCYTES # BLD AUTO: 0.42 10*3/MM3 (ref 0.1–0.9)
MONOCYTES NFR BLD AUTO: 6 % (ref 5–12)
NEUTROPHILS NFR BLD AUTO: 4.25 10*3/MM3 (ref 1.7–7)
NEUTROPHILS NFR BLD AUTO: 60.8 % (ref 42.7–76)
NRBC BLD AUTO-RTO: 0 /100 WBC (ref 0–0.2)
PLATELET # BLD AUTO: 305 10*3/MM3 (ref 140–450)
PMV BLD AUTO: 10.4 FL (ref 6–12)
POTASSIUM SERPL-SCNC: 3.8 MMOL/L (ref 3.5–5.2)
PROT SERPL-MCNC: 7.6 G/DL (ref 6–8.5)
RBC # BLD AUTO: 5.23 10*6/MM3 (ref 3.77–5.28)
RBC # UR STRIP: NORMAL /HPF
REF LAB TEST METHOD: NORMAL
SODIUM SERPL-SCNC: 137 MMOL/L (ref 136–145)
SQUAMOUS #/AREA URNS HPF: NORMAL /HPF
TRIGL SERPL-MCNC: 120 MG/DL (ref 0–150)
TSH SERPL DL<=0.05 MIU/L-ACNC: 0.98 UIU/ML (ref 0.27–4.2)
VLDLC SERPL-MCNC: 22 MG/DL (ref 5–40)
WBC # UR STRIP: NORMAL /HPF
WBC NRBC COR # BLD: 6.99 10*3/MM3 (ref 3.4–10.8)

## 2023-03-28 PROCEDURE — 81015 MICROSCOPIC EXAM OF URINE: CPT

## 2023-03-28 PROCEDURE — 80050 GENERAL HEALTH PANEL: CPT

## 2023-03-28 PROCEDURE — 80061 LIPID PANEL: CPT

## 2023-03-28 PROCEDURE — 82306 VITAMIN D 25 HYDROXY: CPT

## 2023-03-28 PROCEDURE — 36415 COLL VENOUS BLD VENIPUNCTURE: CPT

## 2023-03-29 LAB — 25(OH)D3 SERPL-MCNC: 16.4 NG/ML (ref 30–100)

## 2023-09-28 ENCOUNTER — HOSPITAL ENCOUNTER (OUTPATIENT)
Dept: GENERAL RADIOLOGY | Facility: HOSPITAL | Age: 33
Discharge: HOME | End: 2023-09-28
Admitting: PHYSICIAN ASSISTANT
Payer: COMMERCIAL

## 2023-09-28 ENCOUNTER — OFFICE VISIT (OUTPATIENT)
Dept: ORTHOPEDIC SURGERY | Facility: CLINIC | Age: 33
End: 2023-09-28
Payer: COMMERCIAL

## 2023-09-28 VITALS
HEIGHT: 60 IN | HEART RATE: 99 BPM | DIASTOLIC BLOOD PRESSURE: 90 MMHG | BODY MASS INDEX: 23.56 KG/M2 | SYSTOLIC BLOOD PRESSURE: 127 MMHG | WEIGHT: 120 LBS

## 2023-09-28 DIAGNOSIS — M25.562 LEFT KNEE PAIN, UNSPECIFIED CHRONICITY: ICD-10-CM

## 2023-09-28 DIAGNOSIS — M25.562 LEFT KNEE PAIN, UNSPECIFIED CHRONICITY: Primary | ICD-10-CM

## 2023-09-28 PROCEDURE — 73562 X-RAY EXAM OF KNEE 3: CPT

## 2023-09-28 PROCEDURE — 99203 OFFICE O/P NEW LOW 30 MIN: CPT | Performed by: PHYSICIAN ASSISTANT

## 2023-09-28 RX ORDER — HYDROCODONE BITARTRATE AND ACETAMINOPHEN 7.5; 325 MG/1; MG/1
1 TABLET ORAL EVERY 6 HOURS PRN
COMMUNITY

## 2023-09-28 RX ORDER — MIRTAZAPINE 15 MG/1
15 TABLET, FILM COATED ORAL DAILY
COMMUNITY
Start: 2023-09-26

## 2023-09-28 RX ORDER — FLUOXETINE HYDROCHLORIDE 20 MG/1
20 CAPSULE ORAL EVERY MORNING
COMMUNITY
Start: 2023-09-25

## 2023-09-28 NOTE — PROGRESS NOTES
[facility] [place] Orthopaedic Surgery New Patient Visit          Patient: Janie Hollis  YOB: 1990  Date of Encounter: 2023  PCP: Pavithra Vásquez APRN      Subjective     Chief Complaint   Patient presents with    Left Knee - Initial Evaluation, Pain, Edema           History of Present Illness:     Janie Hollis is a 33 y.o. female presents today as result of left knee pain anterior lateral pain for 1 month history.  The patient states that she did suffer an acute fall onto the left knee to which she feels as if she had jammed or stubbed her knee.  She continued to progress with her range of motion activity with worsening stiffness and swelling and inability to fully extend.  Patient has had progressive difficulty with these activities.  Patient describes dull throbbing aching sensation at rest with sharp stabbing sensation upon certain twisting and turning motions.  Patient's largest concern is that she is unable to fully flex and extend her knee.  Denies any current paresthesias.        Patient Active Problem List   Diagnosis    Annual GYN exam w/o problems    Mirena    Pain in female pelvis    Encounter for postoperative care     Past Medical History:   Diagnosis Date    Anxiety     Endometriosis 2016    Status post multiple laparoscopies    Oral thrush 2012    hospitalized x 2 weeks    Pain      Past Surgical History:   Procedure Laterality Date     SECTION N/A 2020    Procedure: Repeat LTCS (1 layer closure);  Surgeon: Yassine Prince MD;  Location: [facility] LABOR DELIVERY;  Service: Obstetrics/Gynecology;  Laterality: N/A;     SECTION PRIMARY  2013    LTCS (2 layer)    D & C AND LAPAROSCOPY N/A 2018    [facility] [place]; Dr. Mora; endometrial pathology benign; laparoscopic findings consistent with endometriosis    DIAGNOSTIC LAPAROSCOPY      [facility]; Dr. Mora; findings consistent with endometriosis    DIAGNOSTIC LAPAROSCOPY N/A  12/13/2016    [facility]; Dr. Mora; diagnostic laparoscopy with fulguration of endometriosis    DIAGNOSTIC LAPAROSCOPY N/A 7/2/2019    [facility]; Dr. Mora; findings consistent with endometriosis    ENDOSCOPY  2016    Normal    GANGLION CYST EXCISION Right 2014    SKIN BIOPSY  2017    on abdomen - all benign    TOTAL LAPAROSCOPIC HYSTERECTOMY N/A 5/11/2022    Procedure: ROBOTIC TOTAL LAPAROSCOPIC HYSTERECTOMY SALPINGECTOMY,;  Surgeon: Jose Cruz Ward DO;  Location: [facility];  Service: Robotics - DaVinci;  Laterality: N/A;     Social History     Occupational History    Not on file   Tobacco Use    Smoking status: Never    Smokeless tobacco: Never   Vaping Use    Vaping Use: Never used   Substance and Sexual Activity    Alcohol use: No    Drug use: No    Sexual activity: Yes     Partners: Male     Birth control/protection: None, Hysterectomy    Janie Hollis  reports that she has never smoked. She has never used smokeless tobacco.. I have educated her on the risk of diseases from using tobacco products such as cancer, COPD, and heart disease.           Social History     Social History Narrative    Not on file     Family History   Problem Relation Age of Onset    Diabetes Sister     Heart disease Maternal Grandmother     Hypertension Maternal Grandmother      Current Outpatient Medications   Medication Sig Dispense Refill    FLUoxetine (PROzac) 20 MG capsule Take 1 capsule by mouth Every Morning.      HYDROcodone-acetaminophen (NORCO) 7.5-325 MG per tablet Take 1 tablet by mouth Every 6 (Six) Hours As Needed for Moderate Pain.      ibuprofen (ADVIL,MOTRIN) 800 MG tablet Take 1 tablet by mouth Every 8 (Eight) Hours As Needed for Mild Pain . 40 tablet 1    mirtazapine (REMERON) 15 MG tablet Take 1 tablet by mouth Daily.      Baclofen 5 MG tablet Take 5 mg by mouth Daily As Needed. (Patient not taking: Reported on 9/28/2023)      docusate sodium (Colace) 100 MG capsule Take 1 capsule  "by mouth 2 (Two) Times a Day. (Patient not taking: Reported on 9/28/2023) 40 capsule 1     No current facility-administered medications for this visit.     Allergies   Allergen Reactions    Acetaminophen Other (See Comments)     Tachycardia    Oxycodone Hcl Swelling    Codeine Other (See Comments)     Increased Heart Rate    Wound Dressing Adhesive Rash     Pt reports bad rash after having adhesive tape from surgery            Review of Systems   Constitutional: Negative.   HENT: Negative.     Eyes: Negative.    Cardiovascular: Negative.    Respiratory: Negative.     Endocrine: Negative.    Hematologic/Lymphatic: Negative.    Skin: Negative.    Musculoskeletal:  Positive for back pain, joint pain, joint swelling and neck pain.        Pertinent positives listed in HPI   Gastrointestinal: Negative.    Genitourinary: Negative.    Neurological: Negative.    Psychiatric/Behavioral:  Positive for depression. The patient is nervous/anxious.    Allergic/Immunologic: Negative.          Objective      Vitals:    09/28/23 0849   BP: 127/90   Pulse: 99   Weight: 54.4 kg (120 lb)   Height: 152.4 cm (60\")      BMI is within normal parameters. No other follow-up for BMI required.      Physical Exam  Vitals and nursing note reviewed.   Constitutional:       General: She is not in acute distress.     Appearance: She is not ill-appearing.   HENT:      Head: Normocephalic and atraumatic.      Right Ear: External ear normal.      Left Ear: External ear normal.      Nose: Nose normal. No congestion or rhinorrhea.   Eyes:      Extraocular Movements: Extraocular movements intact.      Conjunctiva/sclera: Conjunctivae normal.      Pupils: Pupils are equal, round, and reactive to light.   Cardiovascular:      Rate and Rhythm: Normal rate.      Pulses: Normal pulses.   Pulmonary:      Effort: Pulmonary effort is normal. No respiratory distress.      Breath sounds: No stridor.   Abdominal:      General: There is no distension. "   Musculoskeletal:      Cervical back: Normal range of motion.      Comments: Examination today of patient's left knee reveals notable effusion with a 10 degree flexion contracture.  Patient has medial and lateral joint line tenderness with palpation.  Patient has patellofemoral crepitus and equivocal Nadine's medially and laterally.  Lachman drawer testing negative.  Neurovascular status grossly intact left lower extremity   Skin:     General: Skin is warm and dry.      Capillary Refill: Capillary refill takes less than 2 seconds.   Neurological:      General: No focal deficit present.      Mental Status: She is alert and oriented to person, place, and time.   Psychiatric:         Mood and Affect: Mood normal.         Behavior: Behavior normal.         Thought Content: Thought content normal.         Judgment: Judgment normal.                 Radiology:        XR Knee 3 View Left    Result Date: 9/28/2023    No acute findings in the left knee.   This report was finalized on 9/28/2023 9:05 AM by Dr. Kai Caicedo MD.           Assessment/Plan        ICD-10-CM ICD-9-CM   1. Left knee pain, unspecified chronicity  M25.562 719.46     33-year-old female with notable 1 month history of an acute injury in which patient suffered notable effusion and subsequent hematoma development intra-articular space left knee.  Upon attempted aspiration there was evidence concerning for the notable hematoma to which aspiration and injection could not be completed.  The patient was sent with an order for further diagnostic imaging MRI left knee.  The patient will return back on completion of further diagnostic imaging.                        This document was signed by Adilson Hobbs PA-C September 28, 2023    CC: Pavithra Vásquez APRN      Dictated Utilizing Dragon Dictation:   Please note that portions of this note were completed with a voice recognition program.   Part of this note may be an electronic transcription/translation of  spoken language to printed text using the Dragon Dictation System.

## 2023-10-10 ENCOUNTER — TRANSCRIBE ORDERS (OUTPATIENT)
Dept: ADMINISTRATIVE | Facility: HOSPITAL | Age: 33
End: 2023-10-10
Payer: COMMERCIAL

## 2023-10-10 DIAGNOSIS — S83.204A OTH TEAR OF UNSP MENISCUS, CURRENT INJURY, LEFT KNEE, INIT: Primary | ICD-10-CM

## 2023-10-15 ENCOUNTER — HOSPITAL ENCOUNTER (OUTPATIENT)
Dept: MRI IMAGING | Facility: HOSPITAL | Age: 33
Discharge: HOME | End: 2023-10-15
Admitting: NURSE PRACTITIONER
Payer: COMMERCIAL

## 2023-10-15 DIAGNOSIS — S83.204A OTH TEAR OF UNSP MENISCUS, CURRENT INJURY, LEFT KNEE, INIT: ICD-10-CM

## 2023-10-15 PROCEDURE — 73721 MRI JNT OF LWR EXTRE W/O DYE: CPT | Performed by: RADIOLOGY

## 2023-10-15 PROCEDURE — 73721 MRI JNT OF LWR EXTRE W/O DYE: CPT

## 2023-11-06 ENCOUNTER — OFFICE VISIT (OUTPATIENT)
Dept: ORTHOPEDIC SURGERY | Facility: CLINIC | Age: 33
End: 2023-11-06
Payer: COMMERCIAL

## 2023-11-06 VITALS — WEIGHT: 119.93 LBS | HEIGHT: 60 IN | BODY MASS INDEX: 23.55 KG/M2

## 2023-11-06 DIAGNOSIS — S83.005D TRAUMATIC SUBLUXATION OF LEFT PATELLOFEMORAL JOINT, SUBSEQUENT ENCOUNTER: Primary | ICD-10-CM

## 2023-11-06 DIAGNOSIS — M25.562 LEFT KNEE PAIN, UNSPECIFIED CHRONICITY: ICD-10-CM

## 2023-11-06 NOTE — PROGRESS NOTES
[facility] [place] Visit          Patient: Janie Hollis  YOB: 1990  Date of Encounter: 2023  PCP: Pavithra Vásquez APRN      Subjective     Chief Complaint   Patient presents with   • Left Knee - Pain, Follow-up           History of Present Illness:     Janie Hollis is a 33 y.o. female presents today follow up result of left knee pain anterior lateral pain for 2 month history.  The patient states that she did suffer an acute fall onto the left knee to which she feels as if she had jammed or stubbed her knee.  She continued to progress with her range of motion activity with worsening stiffness and swelling and inability to fully extend.  Patient has had progressive difficulty with these activities.  Patient describes dull throbbing aching sensation at rest with sharp stabbing sensation upon certain twisting and turning motions.  Patient's largest concern is that she is unable to fully flex and extend her knee.  Denies any current paresthesias.  Patient presents today for evaluation and review MRI results review left knee.        Patient Active Problem List   Diagnosis   • Annual GYN exam w/o problems   • Mirena   • Pain in female pelvis   • Encounter for postoperative care     Past Medical History:   Diagnosis Date   • Anxiety    • Endometriosis 2016    Status post multiple laparoscopies   • Oral thrush 2012    hospitalized x 2 weeks   • Pain      Past Surgical History:   Procedure Laterality Date   •  SECTION N/A 2020    Procedure: Repeat LTCS (1 layer closure);  Surgeon: Yassine Prince MD;  Location: [facility] LABOR DELIVERY;  Service: Obstetrics/Gynecology;  Laterality: N/A;   •  SECTION PRIMARY  2013    LTCS (2 layer)   • D & C AND LAPAROSCOPY N/A 2018    [facility] [place]; Dr. Mora; endometrial pathology benign; laparoscopic findings consistent with endometriosis   • DIAGNOSTIC LAPAROSCOPY      [facility]  [place]; Dr. Mora; findings consistent with endometriosis   • DIAGNOSTIC LAPAROSCOPY N/A 12/13/2016    [facility] [place]; Dr. Mora; diagnostic laparoscopy with fulguration of endometriosis   • DIAGNOSTIC LAPAROSCOPY N/A 7/2/2019    [facility]; Dr. Mora; findings consistent with endometriosis   • ENDOSCOPY  2016    Normal   • GANGLION CYST EXCISION Right 2014   • SKIN BIOPSY  2017    on abdomen - all benign   • TOTAL LAPAROSCOPIC HYSTERECTOMY N/A 5/11/2022    Procedure: ROBOTIC TOTAL LAPAROSCOPIC HYSTERECTOMY SALPINGECTOMY,;  Surgeon: Jose Cruz Ward DO;  Location: [facility];  Service: Robotics - DaVinci;  Laterality: N/A;     Social History     Occupational History   • Not on file   Tobacco Use   • Smoking status: Never   • Smokeless tobacco: Never   Vaping Use   • Vaping Use: Never used   Substance and Sexual Activity   • Alcohol use: No   • Drug use: No   • Sexual activity: Yes     Partners: Male     Birth control/protection: None, Hysterectomy    Janie Hollis  reports that she has never smoked. She has never used smokeless tobacco.. I have educated her on the risk of diseases from using tobacco products such as cancer, COPD, and heart disease.           Social History     Social History Narrative   • Not on file     Family History   Problem Relation Age of Onset   • Diabetes Sister    • Heart disease Maternal Grandmother    • Hypertension Maternal Grandmother      Current Outpatient Medications   Medication Sig Dispense Refill   • Baclofen 5 MG tablet Take 1 tablet by mouth Daily As Needed.     • docusate sodium (Colace) 100 MG capsule Take 1 capsule by mouth 2 (Two) Times a Day. 40 capsule 1   • FLUoxetine (PROzac) 20 MG capsule Take 1 capsule by mouth Every Morning.     • HYDROcodone-acetaminophen (NORCO) 7.5-325 MG per tablet Take 1 tablet by mouth Every 6 (Six) Hours As Needed for Moderate Pain.     • ibuprofen (ADVIL,MOTRIN) 800 MG tablet Take 1 tablet by mouth Every 8 (Eight)  "Hours As Needed for Mild Pain . 40 tablet 1   • mirtazapine (REMERON) 15 MG tablet Take 1 tablet by mouth Daily.       No current facility-administered medications for this visit.     Allergies   Allergen Reactions   • Acetaminophen Other (See Comments)     Tachycardia   • Oxycodone Hcl Swelling   • Codeine Other (See Comments)     Increased Heart Rate   • Wound Dressing Adhesive Rash     Pt reports bad rash after having adhesive tape from surgery            Review of Systems   Constitutional: Negative.   HENT: Negative.     Eyes: Negative.    Cardiovascular: Negative.    Respiratory: Negative.     Endocrine: Negative.    Hematologic/Lymphatic: Negative.    Skin: Negative.    Musculoskeletal:  Positive for back pain, joint pain, joint swelling and neck pain.        Pertinent positives listed in HPI   Gastrointestinal: Negative.    Genitourinary: Negative.    Neurological: Negative.    Psychiatric/Behavioral:  Positive for depression. The patient is nervous/anxious.    Allergic/Immunologic: Negative.          Objective      Vitals:    11/06/23 1321   Weight: 54.4 kg (119 lb 14.9 oz)   Height: 152.4 cm (60\")      BMI is within normal parameters. No other follow-up for BMI required.      Physical Exam  Vitals and nursing note reviewed.   Constitutional:       General: She is not in acute distress.     Appearance: She is not ill-appearing.   HENT:      Head: Normocephalic and atraumatic.      Right Ear: External ear normal.      Left Ear: External ear normal.      Nose: Nose normal. No congestion or rhinorrhea.   Eyes:      Extraocular Movements: Extraocular movements intact.      Conjunctiva/sclera: Conjunctivae normal.      Pupils: Pupils are equal, round, and reactive to light.   Cardiovascular:      Rate and Rhythm: Normal rate.      Pulses: Normal pulses.   Pulmonary:      Effort: Pulmonary effort is normal. No respiratory distress.      Breath sounds: No stridor.   Abdominal:      General: There is no distension. "   Musculoskeletal:      Cervical back: Normal range of motion.      Comments: Examination today of patient's left knee reveals improvement of the previous  effusion.  Patient has medial and lateral joint line tenderness with palpation.  Patient has patellofemoral crepitus and equivocal Nadine's medially and laterally.  Lachman drawer testing negative.  Neurovascular status grossly intact left lower extremity   Skin:     General: Skin is warm and dry.      Capillary Refill: Capillary refill takes less than 2 seconds.   Neurological:      General: No focal deficit present.      Mental Status: She is alert and oriented to person, place, and time.   Psychiatric:         Mood and Affect: Mood normal.         Behavior: Behavior normal.         Thought Content: Thought content normal.         Judgment: Judgment normal.                 Radiology:        No radiology results for the last 30 days.        Assessment/Plan        ICD-10-CM ICD-9-CM   1. Traumatic subluxation of left patellofemoral joint, subsequent encounter  S83.005D V58.89     836.3   2. Left knee pain, unspecified chronicity  M25.562 719.46       33-year-old female with notable 2-month history of an acute injury which patient suffered notable effusion and subsequent hematoma of the left knee.  Patient has had resolution of the majority of the swelling however she still has patellofemoral pain subluxation on exam today.  As result of this the patient was provided today with an order for formal outpatient physical therapy with emphasis on lateral J bracing and slow progression out of bracing as stability and support are obtained.  Modalities as deemed appropriate.  Patient will return back in 4 weeks following a formal outpatient physical therapy.  No direct surgical dictation currently.                          This document was signed by Adilson Hobbs PA-C November 6, 2023    CC: Pavithra Vásquez APRN      Dictated Utilizing Dragon Dictation:   Please note  that portions of this note were completed with a voice recognition program.   Part of this note may be an electronic transcription/translation of spoken language to printed text using the Dragon Dictation System.

## 2023-11-27 ENCOUNTER — TELEPHONE (OUTPATIENT)
Dept: PHYSICAL THERAPY | Facility: CLINIC | Age: 33
End: 2023-11-27

## 2023-12-30 ENCOUNTER — HOSPITAL ENCOUNTER (EMERGENCY)
Facility: HOSPITAL | Age: 33
Discharge: HOME | End: 2023-12-30
Attending: STUDENT IN AN ORGANIZED HEALTH CARE EDUCATION/TRAINING PROGRAM
Payer: COMMERCIAL

## 2023-12-30 ENCOUNTER — APPOINTMENT (OUTPATIENT)
Dept: GENERAL RADIOLOGY | Facility: HOSPITAL | Age: 33
End: 2023-12-30
Payer: COMMERCIAL

## 2023-12-30 VITALS
HEIGHT: 60 IN | TEMPERATURE: 97.9 F | WEIGHT: 113 LBS | BODY MASS INDEX: 22.19 KG/M2 | DIASTOLIC BLOOD PRESSURE: 69 MMHG | HEART RATE: 92 BPM | SYSTOLIC BLOOD PRESSURE: 117 MMHG | RESPIRATION RATE: 16 BRPM | OXYGEN SATURATION: 97 %

## 2023-12-30 DIAGNOSIS — E87.6 HYPOKALEMIA: ICD-10-CM

## 2023-12-30 DIAGNOSIS — J10.1 INFLUENZA B: Primary | ICD-10-CM

## 2023-12-30 LAB
ALBUMIN SERPL-MCNC: 4.4 G/DL (ref 3.5–5.2)
ALBUMIN/GLOB SERPL: 1.4 G/DL
ALP SERPL-CCNC: 77 U/L (ref 39–117)
ALT SERPL W P-5'-P-CCNC: 25 U/L (ref 1–33)
ANION GAP SERPL CALCULATED.3IONS-SCNC: 12.1 MMOL/L (ref 5–15)
APTT PPP: 27.5 SECONDS (ref 26.5–34.5)
AST SERPL-CCNC: 18 U/L (ref 1–32)
BASOPHILS # BLD AUTO: 0.02 10*3/MM3 (ref 0–0.2)
BASOPHILS NFR BLD AUTO: 0.3 % (ref 0–1.5)
BILIRUB SERPL-MCNC: 0.3 MG/DL (ref 0–1.2)
BILIRUB UR QL STRIP: NEGATIVE
BUN SERPL-MCNC: 7 MG/DL (ref 6–20)
BUN/CREAT SERPL: 11.1 (ref 7–25)
CALCIUM SPEC-SCNC: 9.3 MG/DL (ref 8.6–10.5)
CHLORIDE SERPL-SCNC: 104 MMOL/L (ref 98–107)
CLARITY UR: CLEAR
CO2 SERPL-SCNC: 24.9 MMOL/L (ref 22–29)
COLOR UR: YELLOW
CREAT SERPL-MCNC: 0.63 MG/DL (ref 0.57–1)
CRP SERPL-MCNC: <0.3 MG/DL (ref 0–0.5)
D DIMER PPP FEU-MCNC: 0.29 MCGFEU/ML (ref 0–0.5)
DEPRECATED RDW RBC AUTO: 43.3 FL (ref 37–54)
EGFRCR SERPLBLD CKD-EPI 2021: 121.8 ML/MIN/1.73
EOSINOPHIL # BLD AUTO: 0.13 10*3/MM3 (ref 0–0.4)
EOSINOPHIL NFR BLD AUTO: 2 % (ref 0.3–6.2)
ERYTHROCYTE [DISTWIDTH] IN BLOOD BY AUTOMATED COUNT: 14.2 % (ref 12.3–15.4)
FLUAV RNA RESP QL NAA+PROBE: NOT DETECTED
FLUBV RNA RESP QL NAA+PROBE: DETECTED
GEN 5 2HR TROPONIN T REFLEX: <6 NG/L
GLOBULIN UR ELPH-MCNC: 3.1 GM/DL
GLUCOSE SERPL-MCNC: 109 MG/DL (ref 65–99)
GLUCOSE UR STRIP-MCNC: NEGATIVE MG/DL
HCT VFR BLD AUTO: 42.2 % (ref 34–46.6)
HGB BLD-MCNC: 14.3 G/DL (ref 12–15.9)
HGB UR QL STRIP.AUTO: NEGATIVE
HOLD SPECIMEN: NORMAL
HOLD SPECIMEN: NORMAL
IMM GRANULOCYTES # BLD AUTO: 0.03 10*3/MM3 (ref 0–0.05)
IMM GRANULOCYTES NFR BLD AUTO: 0.5 % (ref 0–0.5)
INR PPP: 0.96 (ref 0.9–1.1)
KETONES UR QL STRIP: ABNORMAL
LEUKOCYTE ESTERASE UR QL STRIP.AUTO: NEGATIVE
LIPASE SERPL-CCNC: 49 U/L (ref 13–60)
LYMPHOCYTES # BLD AUTO: 2.02 10*3/MM3 (ref 0.7–3.1)
LYMPHOCYTES NFR BLD AUTO: 31.2 % (ref 19.6–45.3)
MAGNESIUM SERPL-MCNC: 2 MG/DL (ref 1.6–2.6)
MCH RBC QN AUTO: 28.4 PG (ref 26.6–33)
MCHC RBC AUTO-ENTMCNC: 33.9 G/DL (ref 31.5–35.7)
MCV RBC AUTO: 83.7 FL (ref 79–97)
MONOCYTES # BLD AUTO: 0.39 10*3/MM3 (ref 0.1–0.9)
MONOCYTES NFR BLD AUTO: 6 % (ref 5–12)
NEUTROPHILS NFR BLD AUTO: 3.88 10*3/MM3 (ref 1.7–7)
NEUTROPHILS NFR BLD AUTO: 60 % (ref 42.7–76)
NITRITE UR QL STRIP: NEGATIVE
NRBC BLD AUTO-RTO: 0 /100 WBC (ref 0–0.2)
PH UR STRIP.AUTO: 6 [PH] (ref 5–8)
PLATELET # BLD AUTO: 291 10*3/MM3 (ref 140–450)
PMV BLD AUTO: 9.7 FL (ref 6–12)
POTASSIUM SERPL-SCNC: 2.9 MMOL/L (ref 3.5–5.2)
PROCALCITONIN SERPL-MCNC: 0.34 NG/ML (ref 0–0.25)
PROT SERPL-MCNC: 7.5 G/DL (ref 6–8.5)
PROT UR QL STRIP: NEGATIVE
PROTHROMBIN TIME: 13.3 SECONDS (ref 12.1–14.7)
RBC # BLD AUTO: 5.04 10*6/MM3 (ref 3.77–5.28)
SARS-COV-2 RNA RESP QL NAA+PROBE: NOT DETECTED
SODIUM SERPL-SCNC: 141 MMOL/L (ref 136–145)
SP GR UR STRIP: <=1.005 (ref 1–1.03)
TROPONIN T DELTA: NORMAL
TROPONIN T SERPL HS-MCNC: <6 NG/L
UROBILINOGEN UR QL STRIP: ABNORMAL
WBC NRBC COR # BLD AUTO: 6.47 10*3/MM3 (ref 3.4–10.8)
WHOLE BLOOD HOLD COAG: NORMAL
WHOLE BLOOD HOLD SPECIMEN: NORMAL

## 2023-12-30 PROCEDURE — 84145 PROCALCITONIN (PCT): CPT | Performed by: PHYSICIAN ASSISTANT

## 2023-12-30 PROCEDURE — 93005 ELECTROCARDIOGRAM TRACING: CPT | Performed by: STUDENT IN AN ORGANIZED HEALTH CARE EDUCATION/TRAINING PROGRAM

## 2023-12-30 PROCEDURE — 96360 HYDRATION IV INFUSION INIT: CPT

## 2023-12-30 PROCEDURE — 86140 C-REACTIVE PROTEIN: CPT | Performed by: PHYSICIAN ASSISTANT

## 2023-12-30 PROCEDURE — 83735 ASSAY OF MAGNESIUM: CPT | Performed by: PHYSICIAN ASSISTANT

## 2023-12-30 PROCEDURE — 80053 COMPREHEN METABOLIC PANEL: CPT | Performed by: STUDENT IN AN ORGANIZED HEALTH CARE EDUCATION/TRAINING PROGRAM

## 2023-12-30 PROCEDURE — 87636 SARSCOV2 & INF A&B AMP PRB: CPT | Performed by: PHYSICIAN ASSISTANT

## 2023-12-30 PROCEDURE — 99284 EMERGENCY DEPT VISIT MOD MDM: CPT

## 2023-12-30 PROCEDURE — 36415 COLL VENOUS BLD VENIPUNCTURE: CPT

## 2023-12-30 PROCEDURE — 85025 COMPLETE CBC W/AUTO DIFF WBC: CPT | Performed by: STUDENT IN AN ORGANIZED HEALTH CARE EDUCATION/TRAINING PROGRAM

## 2023-12-30 PROCEDURE — 85610 PROTHROMBIN TIME: CPT | Performed by: PHYSICIAN ASSISTANT

## 2023-12-30 PROCEDURE — 83690 ASSAY OF LIPASE: CPT | Performed by: PHYSICIAN ASSISTANT

## 2023-12-30 PROCEDURE — 71045 X-RAY EXAM CHEST 1 VIEW: CPT | Performed by: RADIOLOGY

## 2023-12-30 PROCEDURE — 85379 FIBRIN DEGRADATION QUANT: CPT | Performed by: PHYSICIAN ASSISTANT

## 2023-12-30 PROCEDURE — 84484 ASSAY OF TROPONIN QUANT: CPT | Performed by: STUDENT IN AN ORGANIZED HEALTH CARE EDUCATION/TRAINING PROGRAM

## 2023-12-30 PROCEDURE — 71045 X-RAY EXAM CHEST 1 VIEW: CPT

## 2023-12-30 PROCEDURE — 85730 THROMBOPLASTIN TIME PARTIAL: CPT | Performed by: PHYSICIAN ASSISTANT

## 2023-12-30 PROCEDURE — 81003 URINALYSIS AUTO W/O SCOPE: CPT | Performed by: PHYSICIAN ASSISTANT

## 2023-12-30 RX ORDER — POTASSIUM CHLORIDE 750 MG/1
10 TABLET, FILM COATED, EXTENDED RELEASE ORAL DAILY
Qty: 3 TABLET | Refills: 0 | Status: SHIPPED | OUTPATIENT
Start: 2023-12-30

## 2023-12-30 RX ORDER — POTASSIUM CHLORIDE 20 MEQ/1
20 TABLET, EXTENDED RELEASE ORAL ONCE
Status: COMPLETED | OUTPATIENT
Start: 2023-12-30 | End: 2023-12-30

## 2023-12-30 RX ORDER — POTASSIUM CHLORIDE 20 MEQ/1
40 TABLET, EXTENDED RELEASE ORAL ONCE
Status: COMPLETED | OUTPATIENT
Start: 2023-12-30 | End: 2023-12-30

## 2023-12-30 RX ORDER — SODIUM CHLORIDE 0.9 % (FLUSH) 0.9 %
10 SYRINGE (ML) INJECTION AS NEEDED
Status: DISCONTINUED | OUTPATIENT
Start: 2023-12-30 | End: 2023-12-30 | Stop reason: HOSPADM

## 2023-12-30 RX ADMIN — SODIUM CHLORIDE 1500 ML: 9 INJECTION, SOLUTION INTRAVENOUS at 16:46

## 2023-12-30 RX ADMIN — POTASSIUM CHLORIDE 40 MEQ: 1500 TABLET, EXTENDED RELEASE ORAL at 16:50

## 2023-12-30 RX ADMIN — POTASSIUM CHLORIDE 20 MEQ: 1500 TABLET, EXTENDED RELEASE ORAL at 18:49

## 2023-12-30 NOTE — ED PROVIDER NOTES
"Subjective   History of Present Illness  33-year-old female who presents to the ED today for palpitations and chest heaviness.  She states she has been sick since December 24.  She believes that she has the flu.  She states that she has had chest heaviness and heart racing.  She states her symptoms are worse when she stands.  She states today when she stood up \"everything went black.\"  She also reports that she missed about 5 days of her Prozac and Remeron.  She states she was out of her Remeron and could not find her Prozac.  She states she has been back on these for the last 2 days.  She states she has been having nausea, vomiting and diarrhea as well as cramps and tremors.  She denies any cough or fever.    History provided by:  Patient  Illness  Severity:  Moderate  Onset quality:  Sudden  Duration:  6 days  Timing:  Constant  Progression:  Waxing and waning  Chronicity:  New  Associated symptoms: chest pain (heaviness), diarrhea, fatigue, myalgias, nausea and vomiting    Associated symptoms: no abdominal pain, no cough, no fever and no shortness of breath        Review of Systems   Constitutional:  Positive for appetite change and fatigue. Negative for fever.   HENT: Negative.     Eyes: Negative.    Respiratory: Negative.  Negative for cough and shortness of breath.    Cardiovascular:  Positive for chest pain (heaviness) and palpitations.   Gastrointestinal: Negative.  Positive for diarrhea, nausea and vomiting. Negative for abdominal pain.   Genitourinary: Negative.    Musculoskeletal:  Positive for myalgias.   Skin: Negative.    Neurological:  Positive for dizziness and tremors.   Psychiatric/Behavioral: Negative.     All other systems reviewed and are negative.      Past Medical History:   Diagnosis Date    Anxiety 2015    Endometriosis 2016    Status post multiple laparoscopies    Oral thrush 2012    hospitalized x 2 weeks    Pain        Allergies   Allergen Reactions    Acetaminophen Other (See Comments) "     Tachycardia    Oxycodone Hcl Swelling    Codeine Other (See Comments)     Increased Heart Rate    Wound Dressing Adhesive Rash     Pt reports bad rash after having adhesive tape from surgery       Past Surgical History:   Procedure Laterality Date     SECTION N/A 2020    Procedure: Repeat LTCS (1 layer closure);  Surgeon: Yassine Prince MD;  Location: [facility] LABOR DELIVERY;  Service: Obstetrics/Gynecology;  Laterality: N/A;     SECTION PRIMARY  2013    LTCS (2 layer)    D & C AND LAPAROSCOPY N/A 2018    [facility]; Dr. Mora; endometrial pathology benign; laparoscopic findings consistent with endometriosis    DIAGNOSTIC LAPAROSCOPY      [facility]; Dr. Mora; findings consistent with endometriosis    DIAGNOSTIC LAPAROSCOPY N/A 2016    [facility]; Dr. Mora; diagnostic laparoscopy with fulguration of endometriosis    DIAGNOSTIC LAPAROSCOPY N/A 2019    [facility]; Dr. Mora; findings consistent with endometriosis    ENDOSCOPY  2016    Normal    GANGLION CYST EXCISION Right 2014    SKIN BIOPSY  2017    on abdomen - all benign    TOTAL LAPAROSCOPIC HYSTERECTOMY N/A 2022    Procedure: ROBOTIC TOTAL LAPAROSCOPIC HYSTERECTOMY SALPINGECTOMY,;  Surgeon: Jose Cruz Ward DO;  Location: [facility];  Service: Robotics - DaVinci;  Laterality: N/A;       Family History   Problem Relation Age of Onset    Diabetes Sister     Heart disease Maternal Grandmother     Hypertension Maternal Grandmother        Social History     Socioeconomic History    Marital status:    Tobacco Use    Smoking status: Never    Smokeless tobacco: Never   Vaping Use    Vaping Use: Never used   Substance and Sexual Activity    Alcohol use: No    Drug use: No    Sexual activity: Yes     Partners: Male     Birth control/protection: None, Hysterectomy           Objective   Physical Exam  Vitals and nursing note reviewed.   Constitutional:        General: She is not in acute distress.     Appearance: She is well-developed. She is not diaphoretic.   HENT:      Head: Normocephalic and atraumatic.   Eyes:      Extraocular Movements: Extraocular movements intact.      Pupils: Pupils are equal, round, and reactive to light.   Neck:      Vascular: No JVD.      Trachea: No tracheal deviation.   Cardiovascular:      Rate and Rhythm: Normal rate and regular rhythm.      Heart sounds: Normal heart sounds.   Pulmonary:      Effort: Pulmonary effort is normal.      Breath sounds: Normal breath sounds.   Chest:      Chest wall: No tenderness.   Abdominal:      General: Bowel sounds are normal.      Palpations: Abdomen is soft.      Tenderness: There is no abdominal tenderness.   Musculoskeletal:         General: Normal range of motion.      Cervical back: Normal range of motion and neck supple.      Right lower leg: No edema.      Left lower leg: No edema.   Lymphadenopathy:      Cervical: No cervical adenopathy.   Skin:     General: Skin is warm and dry.      Capillary Refill: Capillary refill takes less than 2 seconds.   Neurological:      General: No focal deficit present.      Mental Status: She is alert and oriented to person, place, and time.   Psychiatric:         Mood and Affect: Mood normal.         Procedures       Results for orders placed or performed during the hospital encounter of 12/30/23   COVID-19 and FLU A/B PCR, 1 HR TAT - Swab, Nasopharynx    Specimen: Nasopharynx; Swab   Result Value Ref Range    COVID19 Not Detected Not Detected - Ref. Range    Influenza A PCR Not Detected Not Detected    Influenza B PCR Detected (A) Not Detected   Comprehensive Metabolic Panel    Specimen: Blood   Result Value Ref Range    Glucose 109 (H) 65 - 99 mg/dL    BUN 7 6 - 20 mg/dL    Creatinine 0.63 0.57 - 1.00 mg/dL    Sodium 141 136 - 145 mmol/L    Potassium 2.9 (L) 3.5 - 5.2 mmol/L    Chloride 104 98 - 107 mmol/L    CO2 24.9 22.0 - 29.0 mmol/L    Calcium 9.3 8.6 -  10.5 mg/dL    Total Protein 7.5 6.0 - 8.5 g/dL    Albumin 4.4 3.5 - 5.2 g/dL    ALT (SGPT) 25 1 - 33 U/L    AST (SGOT) 18 1 - 32 U/L    Alkaline Phosphatase 77 39 - 117 U/L    Total Bilirubin 0.3 0.0 - 1.2 mg/dL    Globulin 3.1 gm/dL    A/G Ratio 1.4 g/dL    BUN/Creatinine Ratio 11.1 7.0 - 25.0    Anion Gap 12.1 5.0 - 15.0 mmol/L    eGFR 121.8 >60.0 mL/min/1.73   High Sensitivity Troponin T    Specimen: Blood   Result Value Ref Range    HS Troponin T <6 <14 ng/L   CBC Auto Differential    Specimen: Blood   Result Value Ref Range    WBC 6.47 3.40 - 10.80 10*3/mm3    RBC 5.04 3.77 - 5.28 10*6/mm3    Hemoglobin 14.3 12.0 - 15.9 g/dL    Hematocrit 42.2 34.0 - 46.6 %    MCV 83.7 79.0 - 97.0 fL    MCH 28.4 26.6 - 33.0 pg    MCHC 33.9 31.5 - 35.7 g/dL    RDW 14.2 12.3 - 15.4 %    RDW-SD 43.3 37.0 - 54.0 fl    MPV 9.7 6.0 - 12.0 fL    Platelets 291 140 - 450 10*3/mm3    Neutrophil % 60.0 42.7 - 76.0 %    Lymphocyte % 31.2 19.6 - 45.3 %    Monocyte % 6.0 5.0 - 12.0 %    Eosinophil % 2.0 0.3 - 6.2 %    Basophil % 0.3 0.0 - 1.5 %    Immature Grans % 0.5 0.0 - 0.5 %    Neutrophils, Absolute 3.88 1.70 - 7.00 10*3/mm3    Lymphocytes, Absolute 2.02 0.70 - 3.10 10*3/mm3    Monocytes, Absolute 0.39 0.10 - 0.90 10*3/mm3    Eosinophils, Absolute 0.13 0.00 - 0.40 10*3/mm3    Basophils, Absolute 0.02 0.00 - 0.20 10*3/mm3    Immature Grans, Absolute 0.03 0.00 - 0.05 10*3/mm3    nRBC 0.0 0.0 - 0.2 /100 WBC   Protime-INR    Specimen: Blood   Result Value Ref Range    Protime 13.3 12.1 - 14.7 Seconds    INR 0.96 0.90 - 1.10   aPTT    Specimen: Blood   Result Value Ref Range    PTT 27.5 26.5 - 34.5 seconds   Lipase    Specimen: Blood   Result Value Ref Range    Lipase 49 13 - 60 U/L   Urinalysis With Microscopic If Indicated (No Culture) - Urine, Clean Catch    Specimen: Urine, Clean Catch   Result Value Ref Range    Color, UA Yellow Yellow, Straw    Appearance, UA Clear Clear    pH, UA 6.0 5.0 - 8.0    Specific Gravity, UA <=1.005 1.005 -  1.030    Glucose, UA Negative Negative    Ketones, UA Trace (A) Negative    Bilirubin, UA Negative Negative    Blood, UA Negative Negative    Protein, UA Negative Negative    Leuk Esterase, UA Negative Negative    Nitrite, UA Negative Negative    Urobilinogen, UA 0.2 E.U./dL 0.2 - 1.0 E.U./dL   D-dimer, Quantitative    Specimen: Blood   Result Value Ref Range    D-Dimer, Quantitative 0.29 0.00 - 0.50 MCGFEU/mL   Procalcitonin    Specimen: Blood   Result Value Ref Range    Procalcitonin 0.34 (H) 0.00 - 0.25 ng/mL   C-reactive Protein    Specimen: Blood   Result Value Ref Range    C-Reactive Protein <0.30 0.00 - 0.50 mg/dL   Magnesium    Specimen: Blood   Result Value Ref Range    Magnesium 2.0 1.6 - 2.6 mg/dL   High Sensitivity Troponin T 2Hr    Specimen: Arm, Left; Blood   Result Value Ref Range    HS Troponin T <6 <14 ng/L    Troponin T Delta     ECG 12 Lead ED Triage Standing Order; Chest Pain   Result Value Ref Range    QT Interval 338 ms    QTC Interval 440 ms   Green Top (Gel)   Result Value Ref Range    Extra Tube Hold for add-ons.    Lavender Top   Result Value Ref Range    Extra Tube hold for add-on    Gold Top - SST   Result Value Ref Range    Extra Tube Hold for add-ons.    Light Blue Top   Result Value Ref Range    Extra Tube Hold for add-ons.           ED Course  ED Course as of 12/30/23 1845   Sat Dec 30, 2023   1623 Potassium(!): 2.9  Oral replacement ordered []   1640 XR Chest 1 View  FINDINGS:     LUNGS: Lungs are adequately aerated.      HEART AND MEDIASTINUM: Heart and mediastinal contours are unremarkable        SKELETON: Bony and soft tissue structures are unremarkable.           IMPRESSION:  No radiographic evidence of acute cardiac or pulmonary disease.   []   1656 Influenza B PCR(!): Detected []      ED Course User Index  [AH] Kymberly Julien, PA                                             Medical Decision Making  33-year-old female who presents to the ED today for chest heaviness and  palpitations.  She believes she started having flulike symptoms about 6 days ago.  She also has recently missed several doses of her Prozac and Remeron.  Patient was found to be hypokalemic with a potassium of 2.9.  She was given oral replacement in the ED.  She was also given IV hydration.  She did test positive for influenza B.  Chest x-ray was unremarkable.  EKG and troponins were unremarkable.  Plan is for discharge home and she will follow closely with her primary care provider and I recommended having her potassium level rechecked at the next available appointment.  She will return to the ED if symptoms change or worsen.    Problems Addressed:  Hypokalemia: complicated acute illness or injury  Influenza B: complicated acute illness or injury    Amount and/or Complexity of Data Reviewed  Labs: ordered. Decision-making details documented in ED Course.  Radiology: ordered. Decision-making details documented in ED Course.  ECG/medicine tests: ordered.    Risk  Prescription drug management.        Final diagnoses:   Influenza B   Hypokalemia       ED Disposition  ED Disposition       ED Disposition   Discharge    Condition   Stable    Comment   --               Pavithra Vásquez P, APRN  [address] [place] [address]  [phone]    Schedule an appointment as soon as possible for a visit in 3 days           Medication List        New Prescriptions      potassium chloride 10 MEQ CR tablet  Take 1 tablet by mouth Daily.               Where to Get Your Medications        These medications were sent to [facility] - [place], [place] - [address] [place] [address] - [phone]  - [phone] [address] [address]      Phone: [phone]   potassium chloride 10 MEQ CR tablet            Kymberly Julien PA  12/30/23 1735

## 2023-12-30 NOTE — DISCHARGE INSTRUCTIONS
Follow-up with your family doctor in the office at the next available appointment.  Have your potassium level rechecked to make sure it stays up.  A prescription for potassium pills has been sent to your pharmacy.  You can start these tomorrow.  Make sure you are drinking plenty of fluids.  Return to the ED at any time if symptoms change or worsen.

## 2024-01-01 LAB
QT INTERVAL: 338 MS
QTC INTERVAL: 440 MS

## 2024-06-27 ENCOUNTER — HOSPITAL ENCOUNTER (EMERGENCY)
Facility: HOSPITAL | Age: 34
Discharge: HOME | End: 2024-06-27
Attending: EMERGENCY MEDICINE
Payer: COMMERCIAL

## 2024-06-27 ENCOUNTER — APPOINTMENT (OUTPATIENT)
Dept: CT IMAGING | Facility: HOSPITAL | Age: 34
End: 2024-06-27
Payer: COMMERCIAL

## 2024-06-27 VITALS
HEART RATE: 68 BPM | BODY MASS INDEX: 24.15 KG/M2 | OXYGEN SATURATION: 99 % | HEIGHT: 60 IN | WEIGHT: 123 LBS | DIASTOLIC BLOOD PRESSURE: 71 MMHG | RESPIRATION RATE: 15 BRPM | SYSTOLIC BLOOD PRESSURE: 105 MMHG | TEMPERATURE: 98 F

## 2024-06-27 DIAGNOSIS — R55 NEAR SYNCOPE: Primary | ICD-10-CM

## 2024-06-27 LAB
ALBUMIN SERPL-MCNC: 4.4 G/DL (ref 3.5–5.2)
ALBUMIN/GLOB SERPL: 1.9 G/DL
ALP SERPL-CCNC: 69 U/L (ref 39–117)
ALT SERPL W P-5'-P-CCNC: 21 U/L (ref 1–33)
ANION GAP SERPL CALCULATED.3IONS-SCNC: 10.2 MMOL/L (ref 5–15)
AST SERPL-CCNC: 19 U/L (ref 1–32)
BASOPHILS # BLD AUTO: 0.07 10*3/MM3 (ref 0–0.2)
BASOPHILS NFR BLD AUTO: 1 % (ref 0–1.5)
BILIRUB SERPL-MCNC: 0.4 MG/DL (ref 0–1.2)
BILIRUB UR QL STRIP: NEGATIVE
BUN SERPL-MCNC: 4 MG/DL (ref 6–20)
BUN/CREAT SERPL: 7.3 (ref 7–25)
CALCIUM SPEC-SCNC: 9.3 MG/DL (ref 8.6–10.5)
CHLORIDE SERPL-SCNC: 101 MMOL/L (ref 98–107)
CLARITY UR: CLEAR
CO2 SERPL-SCNC: 27.8 MMOL/L (ref 22–29)
COLOR UR: YELLOW
CREAT SERPL-MCNC: 0.55 MG/DL (ref 0.57–1)
DEPRECATED RDW RBC AUTO: 41.8 FL (ref 37–54)
EGFRCR SERPLBLD CKD-EPI 2021: 125.9 ML/MIN/1.73
EOSINOPHIL # BLD AUTO: 0.08 10*3/MM3 (ref 0–0.4)
EOSINOPHIL NFR BLD AUTO: 1.1 % (ref 0.3–6.2)
ERYTHROCYTE [DISTWIDTH] IN BLOOD BY AUTOMATED COUNT: 14.2 % (ref 12.3–15.4)
FLUAV RNA RESP QL NAA+PROBE: NOT DETECTED
FLUBV RNA RESP QL NAA+PROBE: NOT DETECTED
GLOBULIN UR ELPH-MCNC: 2.3 GM/DL
GLUCOSE SERPL-MCNC: 93 MG/DL (ref 65–99)
GLUCOSE UR STRIP-MCNC: NEGATIVE MG/DL
HCT VFR BLD AUTO: 39.7 % (ref 34–46.6)
HGB BLD-MCNC: 13.5 G/DL (ref 12–15.9)
HGB UR QL STRIP.AUTO: NEGATIVE
HOLD SPECIMEN: NORMAL
IMM GRANULOCYTES # BLD AUTO: 0.02 10*3/MM3 (ref 0–0.05)
IMM GRANULOCYTES NFR BLD AUTO: 0.3 % (ref 0–0.5)
KETONES UR QL STRIP: NEGATIVE
LEUKOCYTE ESTERASE UR QL STRIP.AUTO: NEGATIVE
LYMPHOCYTES # BLD AUTO: 2.5 10*3/MM3 (ref 0.7–3.1)
LYMPHOCYTES NFR BLD AUTO: 34.1 % (ref 19.6–45.3)
MAGNESIUM SERPL-MCNC: 2.1 MG/DL (ref 1.6–2.6)
MCH RBC QN AUTO: 27.7 PG (ref 26.6–33)
MCHC RBC AUTO-ENTMCNC: 34 G/DL (ref 31.5–35.7)
MCV RBC AUTO: 81.5 FL (ref 79–97)
MONOCYTES # BLD AUTO: 0.43 10*3/MM3 (ref 0.1–0.9)
MONOCYTES NFR BLD AUTO: 5.9 % (ref 5–12)
NEUTROPHILS NFR BLD AUTO: 4.24 10*3/MM3 (ref 1.7–7)
NEUTROPHILS NFR BLD AUTO: 57.6 % (ref 42.7–76)
NITRITE UR QL STRIP: NEGATIVE
NRBC BLD AUTO-RTO: 0 /100 WBC (ref 0–0.2)
PH UR STRIP.AUTO: 6.5 [PH] (ref 5–8)
PLATELET # BLD AUTO: 320 10*3/MM3 (ref 140–450)
PMV BLD AUTO: 9.5 FL (ref 6–12)
POTASSIUM SERPL-SCNC: 3.1 MMOL/L (ref 3.5–5.2)
PROT SERPL-MCNC: 6.7 G/DL (ref 6–8.5)
PROT UR QL STRIP: NEGATIVE
QT INTERVAL: 436 MS
QTC INTERVAL: 449 MS
RBC # BLD AUTO: 4.87 10*6/MM3 (ref 3.77–5.28)
RSV RNA RESP QL NAA+PROBE: NOT DETECTED
SARS-COV-2 RNA RESP QL NAA+PROBE: NOT DETECTED
SODIUM SERPL-SCNC: 139 MMOL/L (ref 136–145)
SP GR UR STRIP: 1.01 (ref 1–1.03)
T4 FREE SERPL-MCNC: 1.37 NG/DL (ref 0.93–1.7)
TROPONIN T SERPL HS-MCNC: <6 NG/L
TROPONIN T SERPL HS-MCNC: <6 NG/L
TSH SERPL DL<=0.05 MIU/L-ACNC: 1.13 UIU/ML (ref 0.27–4.2)
UROBILINOGEN UR QL STRIP: NORMAL
WBC NRBC COR # BLD AUTO: 7.34 10*3/MM3 (ref 3.4–10.8)
WHOLE BLOOD HOLD COAG: NORMAL
WHOLE BLOOD HOLD COAG: NORMAL
WHOLE BLOOD HOLD SPECIMEN: NORMAL
WHOLE BLOOD HOLD SPECIMEN: NORMAL

## 2024-06-27 PROCEDURE — 87637 SARSCOV2&INF A&B&RSV AMP PRB: CPT

## 2024-06-27 PROCEDURE — 81003 URINALYSIS AUTO W/O SCOPE: CPT

## 2024-06-27 PROCEDURE — 84484 ASSAY OF TROPONIN QUANT: CPT | Performed by: EMERGENCY MEDICINE

## 2024-06-27 PROCEDURE — 84439 ASSAY OF FREE THYROXINE: CPT

## 2024-06-27 PROCEDURE — 70450 CT HEAD/BRAIN W/O DYE: CPT | Performed by: RADIOLOGY

## 2024-06-27 PROCEDURE — 99284 EMERGENCY DEPT VISIT MOD MDM: CPT

## 2024-06-27 PROCEDURE — 83735 ASSAY OF MAGNESIUM: CPT | Performed by: EMERGENCY MEDICINE

## 2024-06-27 PROCEDURE — 80050 GENERAL HEALTH PANEL: CPT | Performed by: EMERGENCY MEDICINE

## 2024-06-27 PROCEDURE — 84484 ASSAY OF TROPONIN QUANT: CPT

## 2024-06-27 PROCEDURE — 93005 ELECTROCARDIOGRAM TRACING: CPT | Performed by: EMERGENCY MEDICINE

## 2024-06-27 PROCEDURE — 25810000003 SODIUM CHLORIDE 0.9 % SOLUTION

## 2024-06-27 PROCEDURE — 36415 COLL VENOUS BLD VENIPUNCTURE: CPT

## 2024-06-27 PROCEDURE — 93010 ELECTROCARDIOGRAM REPORT: CPT | Performed by: INTERNAL MEDICINE

## 2024-06-27 PROCEDURE — 70450 CT HEAD/BRAIN W/O DYE: CPT

## 2024-06-27 RX ORDER — POTASSIUM CHLORIDE 1.5 G/1.58G
40 POWDER, FOR SOLUTION ORAL ONCE
Status: COMPLETED | OUTPATIENT
Start: 2024-06-27 | End: 2024-06-27

## 2024-06-27 RX ORDER — MECLIZINE HCL 12.5 MG/1
25 TABLET ORAL ONCE
Status: COMPLETED | OUTPATIENT
Start: 2024-06-27 | End: 2024-06-27

## 2024-06-27 RX ORDER — POTASSIUM CHLORIDE 20 MEQ/1
20 TABLET, EXTENDED RELEASE ORAL DAILY
Qty: 7 TABLET | Refills: 0 | Status: SHIPPED | OUTPATIENT
Start: 2024-06-27

## 2024-06-27 RX ORDER — SODIUM CHLORIDE 0.9 % (FLUSH) 0.9 %
10 SYRINGE (ML) INJECTION AS NEEDED
Status: DISCONTINUED | OUTPATIENT
Start: 2024-06-27 | End: 2024-06-27 | Stop reason: HOSPADM

## 2024-06-27 RX ORDER — MECLIZINE HYDROCHLORIDE 25 MG/1
25 TABLET ORAL EVERY 6 HOURS PRN
Qty: 20 TABLET | Refills: 0 | Status: SHIPPED | OUTPATIENT
Start: 2024-06-27

## 2024-06-27 RX ADMIN — SODIUM CHLORIDE 1000 ML: 9 INJECTION, SOLUTION INTRAVENOUS at 19:04

## 2024-06-27 RX ADMIN — MECLIZINE HCL 25 MG: 12.5 TABLET ORAL at 21:03

## 2024-06-27 RX ADMIN — POTASSIUM CHLORIDE 40 MEQ: 1.5 POWDER, FOR SOLUTION ORAL at 20:58

## 2024-06-27 NOTE — ED PROVIDER NOTES
Subjective   History of Present Illness  Patient is a 33-year-old female with no significant past medical history who presents today for complaints of dizziness, nausea, lightheadedness that began last night and worsened today.  Patient reports that she has not been able to eat today because she has been nauseated which has made the lightheadedness worse.  Patient reports that she had a near syncopal episode prior to arrival, however did not lose consciousness.  Patient denies any pain, diarrhea, or any known fever.  Patient is alert and oriented and able to answer all questions appropriately.  Patient presents private vehicle with mother at bedside.        Review of Systems   Constitutional: Negative.  Negative for fever.   HENT: Negative.     Respiratory: Negative.     Cardiovascular: Negative.  Negative for chest pain.   Gastrointestinal: Negative.  Negative for abdominal pain.   Endocrine: Negative.    Genitourinary: Negative.  Negative for dysuria.   Skin: Negative.    Neurological: Negative.  Positive for dizziness and light-headedness.        Near syncope   Psychiatric/Behavioral: Negative.     All other systems reviewed and are negative.      Past Medical History:   Diagnosis Date    Anxiety     Endometriosis 2016    Status post multiple laparoscopies    Oral thrush 2012    hospitalized x 2 weeks    Pain        Allergies   Allergen Reactions    Acetaminophen Other (See Comments)     Tachycardia    Oxycodone Hcl Swelling    Codeine Other (See Comments)     Increased Heart Rate    Wound Dressing Adhesive Rash     Pt reports bad rash after having adhesive tape from surgery       Past Surgical History:   Procedure Laterality Date     SECTION N/A 2020    Procedure: Repeat LTCS (1 layer closure);  Surgeon: Yassine Prince MD;  Location: [facility] LABOR DELIVERY;  Service: Obstetrics/Gynecology;  Laterality: N/A;     SECTION PRIMARY  2013    LTCS (2 layer)    D & C AND LAPAROSCOPY N/A  5/8/2018    [facility] Dr. Mora; endometrial pathology benign; laparoscopic findings consistent with endometriosis    DIAGNOSTIC LAPAROSCOPY  2017    [facility]; Dr. Mora; findings consistent with endometriosis    DIAGNOSTIC LAPAROSCOPY N[facility]A [facility]13/2016    [facility] Dr. [facility]; diagnostic laparoscopy with fulguration of endometriosis    DIAGNOSTIC LAPAROSCOPY N/A 7/2/2019    [facility] Dr. [facility]; findings consistent with endometriosis    ENDOSCOPY  2016    Normal    GANGLION CYST EXCISION Right 2014    SKIN BIOPSY  2017    on abdomen - all benign    TOTAL LAPAROSCOPIC HYSTERECTOMY N/A 5/11/2022    Procedure: ROBOTIC TOTAL LAPAROSCOPIC HYSTERECTOMY SALPINGECTOMY,;  Surgeon: Jose Cruz Ward DO;  Location: [facility];  Service: Robotics - DaVinci;  Laterality: N/A;       Family History   Problem Relation Age of Onset    Diabetes Sister     Heart disease Maternal Grandmother     Hypertension Maternal Grandmother        Social History     Socioeconomic History    Marital status:    Tobacco Use    Smoking status: Never    Smokeless tobacco: Never   Vaping Use    Vaping status: Never Used   Substance and Sexual Activity    Alcohol use: No    Drug use: No    Sexual activity: Yes     Partners: Male     Birth control/protection: None, Hysterectomy           Objective   Physical Exam  Vitals and nursing note reviewed.   Constitutional:       General: She is not in acute distress.     Appearance: She is well-developed. She is not diaphoretic.   HENT:      Head: Normocephalic and atraumatic.      Right Ear: External ear normal.      Left Ear: External ear normal.      Nose: Nose normal.   Eyes:      Conjunctiva/sclera: Conjunctivae normal.      Pupils: Pupils are equal, round, and reactive to light.   Neck:      Vascular: No JVD.      Trachea: No tracheal deviation.   Cardiovascular:      Rate and Rhythm: Normal rate and regular rhythm.      Heart sounds: Normal  heart sounds. No murmur heard.  Pulmonary:      Effort: Pulmonary effort is normal. No respiratory distress.      Breath sounds: Normal breath sounds. No wheezing.   Abdominal:      General: Bowel sounds are normal.      Palpations: Abdomen is soft.      Tenderness: There is no abdominal tenderness.   Musculoskeletal:         General: No deformity. Normal range of motion.      Cervical back: Normal range of motion and neck supple.   Skin:     General: Skin is warm and dry.      Coloration: Skin is not pale.      Findings: No erythema or rash.   Neurological:      Mental Status: She is alert and oriented to person, place, and time.      Cranial Nerves: No cranial nerve deficit.   Psychiatric:         Behavior: Behavior normal.         Thought Content: Thought content normal.       Results for orders placed or performed during the hospital encounter of 06/27/24   COVID-19, FLU A/B, RSV PCR 1 HR TAT - Swab, Nasopharynx    Specimen: Nasopharynx; Swab   Result Value Ref Range    COVID19 Not Detected Not Detected - Ref. Range    Influenza A PCR Not Detected Not Detected    Influenza B PCR Not Detected Not Detected    RSV, PCR Not Detected Not Detected   Comprehensive Metabolic Panel    Specimen: Arm, Left; Blood   Result Value Ref Range    Glucose 93 65 - 99 mg/dL    BUN 4 (L) 6 - 20 mg/dL    Creatinine 0.55 (L) 0.57 - 1.00 mg/dL    Sodium 139 136 - 145 mmol/L    Potassium 3.1 (L) 3.5 - 5.2 mmol/L    Chloride 101 98 - 107 mmol/L    CO2 27.8 22.0 - 29.0 mmol/L    Calcium 9.3 8.6 - 10.5 mg/dL    Total Protein 6.7 6.0 - 8.5 g/dL    Albumin 4.4 3.5 - 5.2 g/dL    ALT (SGPT) 21 1 - 33 U/L    AST (SGOT) 19 1 - 32 U/L    Alkaline Phosphatase 69 39 - 117 U/L    Total Bilirubin 0.4 0.0 - 1.2 mg/dL    Globulin 2.3 gm/dL    A/G Ratio 1.9 g/dL    BUN/Creatinine Ratio 7.3 7.0 - 25.0    Anion Gap 10.2 5.0 - 15.0 mmol/L    eGFR 125.9 >60.0 mL/min/1.73   Magnesium    Specimen: Arm, Left; Blood   Result Value Ref Range    Magnesium 2.1 1.6  - 2.6 mg/dL   Single High Sensitivity Troponin T    Specimen: Arm, Left; Blood   Result Value Ref Range    HS Troponin T <6 <14 ng/L   CBC Auto Differential    Specimen: Arm, Left; Blood   Result Value Ref Range    WBC 7.34 3.40 - 10.80 10*3/mm3    RBC 4.87 3.77 - 5.28 10*6/mm3    Hemoglobin 13.5 12.0 - 15.9 g/dL    Hematocrit 39.7 34.0 - 46.6 %    MCV 81.5 79.0 - 97.0 fL    MCH 27.7 26.6 - 33.0 pg    MCHC 34.0 31.5 - 35.7 g/dL    RDW 14.2 12.3 - 15.4 %    RDW-SD 41.8 37.0 - 54.0 fl    MPV 9.5 6.0 - 12.0 fL    Platelets 320 140 - 450 10*3/mm3    Neutrophil % 57.6 42.7 - 76.0 %    Lymphocyte % 34.1 19.6 - 45.3 %    Monocyte % 5.9 5.0 - 12.0 %    Eosinophil % 1.1 0.3 - 6.2 %    Basophil % 1.0 0.0 - 1.5 %    Immature Grans % 0.3 0.0 - 0.5 %    Neutrophils, Absolute 4.24 1.70 - 7.00 10*3/mm3    Lymphocytes, Absolute 2.50 0.70 - 3.10 10*3/mm3    Monocytes, Absolute 0.43 0.10 - 0.90 10*3/mm3    Eosinophils, Absolute 0.08 0.00 - 0.40 10*3/mm3    Basophils, Absolute 0.07 0.00 - 0.20 10*3/mm3    Immature Grans, Absolute 0.02 0.00 - 0.05 10*3/mm3    nRBC 0.0 0.0 - 0.2 /100 WBC   Single High Sensitivity Troponin T    Specimen: Blood   Result Value Ref Range    HS Troponin T <6 <14 ng/L   Urinalysis With Microscopic If Indicated (No Culture) - Urine, Clean Catch    Specimen: Urine, Clean Catch   Result Value Ref Range    Color, UA Yellow Yellow, Straw    Appearance, UA Clear Clear    pH, UA 6.5 5.0 - 8.0    Specific Gravity, UA 1.007 1.005 - 1.030    Glucose, UA Negative Negative    Ketones, UA Negative Negative    Bilirubin, UA Negative Negative    Blood, UA Negative Negative    Protein, UA Negative Negative    Leuk Esterase, UA Negative Negative    Nitrite, UA Negative Negative    Urobilinogen, UA 0.2 E.U./dL 0.2 - 1.0 E.U./dL   ECG 12 Lead ED Triage Standing Order; Syncope   Result Value Ref Range    QT Interval 436 ms    QTC Interval 449 ms   Green Top (Gel)   Result Value Ref Range    Extra Tube Hold for add-ons.     Lavender Top   Result Value Ref Range    Extra Tube hold for add-on    Gold Top - SST   Result Value Ref Range    Extra Tube Hold for add-ons.    Light Blue Top   Result Value Ref Range    Extra Tube Hold for add-ons.    Green Top (Gel)   Result Value Ref Range    Extra Tube Hold for add-ons.    Lavender Top   Result Value Ref Range    Extra Tube hold for add-on    Gold Top - SST   Result Value Ref Range    Extra Tube Hold for add-ons.    Light Blue Top   Result Value Ref Range    Extra Tube Hold for add-ons.      CT Head Without Contrast    Result Date: 6/27/2024  Impression: 1. No acute intracranial process.   This report was finalized on 6/27/2024 7:33 PM by Petey Lan DO.         Procedures           ED Course                                             Medical Decision Making  Patient is a 33-year-old female with no significant past medical history who presents today for complaints of dizziness, nausea, lightheadedness that began last night and worsened today.  Patient reports that she has not been able to eat today because she has been nauseated which has made the lightheadedness worse.  Patient reports that she had a near syncopal episode prior to arrival, however did not lose consciousness.  Patient denies any pain, diarrhea, or any known fever.  Patient is alert and oriented and able to answer all questions appropriately.  Patient presents private vehicle with mother at bedside.    Amount and/or Complexity of Data Reviewed  Labs: ordered.  Radiology: ordered.  ECG/medicine tests: ordered.    Risk  Prescription drug management.        Final diagnoses:   Near syncope       ED Disposition  ED Disposition       ED Disposition   Discharge    Condition   Stable    Comment   --               Pavithra Vásquez, APRN  [address] [place] [address]  [phone]    Schedule an appointment as soon as possible for a visit in 2 days           Medication List        New Prescriptions      meclizine 25 MG  tablet  Commonly known as: ANTIVERT  Take 1 tablet by mouth Every 6 (Six) Hours As Needed for Dizziness.     potassium chloride 20 MEQ CR tablet  Commonly known as: KLOR-CON M20  Take 1 tablet by mouth Daily.               Where to Get Your Medications        You can get these medications from any pharmacy    Bring a paper prescription for each of these medications  meclizine 25 MG tablet  potassium chloride 20 MEQ CR tablet            Tamela Iverson, APRN  06/27/24 2013

## 2024-10-11 NOTE — PROGRESS NOTES
Occupational Therapy    Patient not seen in therapy.     On hold due to medical condition    Re-attempt plan: tomorrow    OT orders received and chart reviewed. Patient's current hgb is 6.8 and not medically appropriate to participate in therapy. OT will check back tomorrow once stable.      OBJECTIVE                       Documented in the chart in the following areas: Assessment/Plan.      Therapy procedure time and total treatment time can be found documented on the Time Entry flowsheet   Subjective   Chief Complaint   Patient presents with   • Pelvic Pain     Janie Hollis is a 31 y.o. year old  presenting to be seen because of bilateral lower quadrant pain that is been going on for about 4 months time.  It is progressively getting worse.  It is equally present in left and right lower quadrants.  She does have a Mirena.  She has had no cycles since her Mirena was placed in 2020.  The pain is similar to when she previously was diagnosed with endometriosis.  It is significantly worse than it used to be.  There is been no fever or chills.  There is been no change in her appetite.  There is been no nausea or vomiting.  She has irritable bowel syndrome.  She takes Bentyl.  She has constipation predominant irritable bowel.  Before initiating Bentyl her bowel movements were multiple times per day.  Now she may have bowel movements 2-3 times a day.    SEXUAL Hx:  She is currently sexually active.  [place] is so painful they do not have intercourse often.  In the past year there there has been NO new sexual partners.    Condoms are never used.  She would not like to be screened for STD's at today's exam.  Current birth control method: IUD - Mirena.  OTHER THINGS SHE WANTS TO DISCUSS TODAY:  Nothing else    The following portions of the patient's history were reviewed and updated as appropriate:current medications and allergies    Social History    Tobacco Use      Smoking status: Never Smoker      Smokeless tobacco: Never Used    Review of Systems  Constitutional POS: see HPI    NEG: anorexia or night sweats   Genitourinary POS: see HPI    NEG: dysuria or hematuria   Gastointestinal POS: see HPI    NEG: bloating, change in bowel habits, melena or reflux symptoms   Integument POS: nothing reported    NEG: moles that are changing in size, shape, color or rashes         Objective   /74   Resp 14   Wt 44 kg (97 lb)   Breastfeeding No   BMI 18.94 kg/m²     General:  well  developed; well nourished  no acute distress   Pelvis: Clinical staff was present for exam  External genitalia:  normal appearance of the external genitalia including Barth[place]'s and [place]'s glands.  :  urethral meatus normal;  Vaginal:  normal pink mucosa without prolapse or lesions.  Cervix:  normal appearance. cervical motion tenderness is absent; IUD string present - 0.5 cms in length;  Uterus:  normal size, shape and consistency. tenderness to palpation is absent;  Adnexa:  normal bimanual exam of the adnexa. NT  Pelvic floor pain: her symptoms ARE reproduced with palpation on the right pelvic sidewall; her symptoms ARE reproduced with palpation on the left pelvic sidewall;     Lab Review   No data reviewed    Imaging   Pelvic ultrasound report        Assessment   1. Pelvic pain of uncertain etiology.  Differential to include functional bowel disorder, pelvic floor dysfunction, IUD issue or potentially endometriosis     Plan   1. Ultrasound needs to be done any time that is convenient for her.   2. The following tests were ordered today: CBC w/ differential, ESR and UA.  It was explained to Janie that all lab test should be back within the one week after they are performed. She will be notified about the results, regardless of the findings. If she has not been contacted by the office within 2 weeks after the test has been performed, it is her responsibility to contact us to learn about her results.  3. The importance of keeping all planned follow-up and taking all medications as prescribed was emphasized.  4. Follow up after ultrasound            This note was electronically signed.    Yassine Prince M.D.  March 31, 2022    Part of this note may be an electronic transcription/translation of spoken language to printed text using the Dragon Dictation System.

## 2025-02-24 ENCOUNTER — TRANSCRIBE ORDERS (OUTPATIENT)
Dept: ADMINISTRATIVE | Facility: HOSPITAL | Age: 35
End: 2025-02-24
Payer: COMMERCIAL

## 2025-02-24 DIAGNOSIS — R60.9 EDEMA, UNSPECIFIED TYPE: ICD-10-CM

## 2025-02-24 DIAGNOSIS — R29.898 MOTOR PROBLEMS WITH LIMBS: ICD-10-CM

## 2025-02-24 DIAGNOSIS — K62.89 CHRONIC IDIOPATHIC ANAL PAIN: Primary | ICD-10-CM

## 2025-02-24 DIAGNOSIS — D89.9 DISEASE OF IMMUNE SYSTEM: ICD-10-CM

## 2025-02-24 DIAGNOSIS — G89.29 CHRONIC IDIOPATHIC ANAL PAIN: Primary | ICD-10-CM

## 2025-02-25 ENCOUNTER — LAB (OUTPATIENT)
Dept: LAB | Facility: HOSPITAL | Age: 35
End: 2025-02-25
Payer: COMMERCIAL

## 2025-02-25 ENCOUNTER — HOSPITAL ENCOUNTER (OUTPATIENT)
Dept: GENERAL RADIOLOGY | Facility: HOSPITAL | Age: 35
Discharge: HOME | End: 2025-02-25
Payer: COMMERCIAL

## 2025-02-25 DIAGNOSIS — K62.89 CHRONIC IDIOPATHIC ANAL PAIN: ICD-10-CM

## 2025-02-25 DIAGNOSIS — D89.9 DISEASE OF IMMUNE SYSTEM: ICD-10-CM

## 2025-02-25 DIAGNOSIS — G89.29 CHRONIC IDIOPATHIC ANAL PAIN: ICD-10-CM

## 2025-02-25 DIAGNOSIS — R29.898 MOTOR PROBLEMS WITH LIMBS: ICD-10-CM

## 2025-02-25 DIAGNOSIS — R60.9 EDEMA, UNSPECIFIED TYPE: ICD-10-CM

## 2025-02-25 LAB
ALBUMIN SERPL-MCNC: 3.6 G/DL (ref 3.5–5.2)
ALBUMIN/GLOB SERPL: 1 G/DL
ALP SERPL-CCNC: 95 U/L (ref 39–117)
ALT SERPL W P-5'-P-CCNC: 34 U/L (ref 1–33)
ANION GAP SERPL CALCULATED.3IONS-SCNC: 13.1 MMOL/L (ref 5–15)
AST SERPL-CCNC: 19 U/L (ref 1–32)
BASOPHILS # BLD AUTO: 0.08 10*3/MM3 (ref 0–0.2)
BASOPHILS NFR BLD AUTO: 1 % (ref 0–1.5)
BILIRUB SERPL-MCNC: 0.3 MG/DL (ref 0–1.2)
BUN SERPL-MCNC: 6 MG/DL (ref 6–20)
BUN/CREAT SERPL: 9.1 (ref 7–25)
CALCIUM SPEC-SCNC: 9.6 MG/DL (ref 8.6–10.5)
CHLORIDE SERPL-SCNC: 104 MMOL/L (ref 98–107)
CHOLEST SERPL-MCNC: 201 MG/DL (ref 0–200)
CHROMATIN AB SERPL-ACNC: <10 IU/ML (ref 0–14)
CO2 SERPL-SCNC: 19.9 MMOL/L (ref 22–29)
CREAT SERPL-MCNC: 0.66 MG/DL (ref 0.57–1)
CRP SERPL-MCNC: 0.12 MG/DL (ref 0.01–0.5)
DEPRECATED RDW RBC AUTO: 43.7 FL (ref 37–54)
EGFRCR SERPLBLD CKD-EPI 2021: 119.7 ML/MIN/1.73
EOSINOPHIL # BLD AUTO: 0.1 10*3/MM3 (ref 0–0.4)
EOSINOPHIL NFR BLD AUTO: 1.3 % (ref 0.3–6.2)
ERYTHROCYTE [DISTWIDTH] IN BLOOD BY AUTOMATED COUNT: 14.5 % (ref 12.3–15.4)
ERYTHROCYTE [SEDIMENTATION RATE] IN BLOOD: 30 MM/HR (ref 0–20)
GLOBULIN UR ELPH-MCNC: 3.6 GM/DL
GLUCOSE SERPL-MCNC: 74 MG/DL (ref 65–99)
HCT VFR BLD AUTO: 39.6 % (ref 34–46.6)
HCV AB SER QL: NORMAL
HDLC SERPL-MCNC: 34 MG/DL (ref 40–60)
HGB BLD-MCNC: 13.2 G/DL (ref 12–15.9)
IMM GRANULOCYTES # BLD AUTO: 0.03 10*3/MM3 (ref 0–0.05)
IMM GRANULOCYTES NFR BLD AUTO: 0.4 % (ref 0–0.5)
LDLC SERPL CALC-MCNC: 145 MG/DL (ref 0–100)
LDLC/HDLC SERPL: 4.22 {RATIO}
LYMPHOCYTES # BLD AUTO: 3.7 10*3/MM3 (ref 0.7–3.1)
LYMPHOCYTES NFR BLD AUTO: 47.1 % (ref 19.6–45.3)
MCH RBC QN AUTO: 28 PG (ref 26.6–33)
MCHC RBC AUTO-ENTMCNC: 33.3 G/DL (ref 31.5–35.7)
MCV RBC AUTO: 84.1 FL (ref 79–97)
MONOCYTES # BLD AUTO: 0.57 10*3/MM3 (ref 0.1–0.9)
MONOCYTES NFR BLD AUTO: 7.3 % (ref 5–12)
NEUTROPHILS NFR BLD AUTO: 3.37 10*3/MM3 (ref 1.7–7)
NEUTROPHILS NFR BLD AUTO: 42.9 % (ref 42.7–76)
NRBC BLD AUTO-RTO: 0 /100 WBC (ref 0–0.2)
PLATELET # BLD AUTO: 420 10*3/MM3 (ref 140–450)
PMV BLD AUTO: 10 FL (ref 6–12)
POTASSIUM SERPL-SCNC: 3.7 MMOL/L (ref 3.5–5.2)
PROT SERPL-MCNC: 7.2 G/DL (ref 6–8.5)
RBC # BLD AUTO: 4.71 10*6/MM3 (ref 3.77–5.28)
SODIUM SERPL-SCNC: 137 MMOL/L (ref 136–145)
T3 SERPL-MCNC: 169 NG/DL (ref 80–200)
T4 SERPL-MCNC: 6.88 MCG/DL (ref 4.5–11.7)
TRIGL SERPL-MCNC: 118 MG/DL (ref 0–150)
TSH SERPL DL<=0.05 MIU/L-ACNC: 2.36 UIU/ML (ref 0.27–4.2)
URATE SERPL-MCNC: 4.7 MG/DL (ref 2.4–5.7)
VLDLC SERPL-MCNC: 22 MG/DL (ref 5–40)
WBC NRBC COR # BLD AUTO: 7.85 10*3/MM3 (ref 3.4–10.8)

## 2025-02-25 PROCEDURE — 86480 TB TEST CELL IMMUN MEASURE: CPT

## 2025-02-25 PROCEDURE — 82652 VIT D 1 25-DIHYDROXY: CPT

## 2025-02-25 PROCEDURE — 86803 HEPATITIS C AB TEST: CPT

## 2025-02-25 PROCEDURE — 86235 NUCLEAR ANTIGEN ANTIBODY: CPT

## 2025-02-25 PROCEDURE — 86141 C-REACTIVE PROTEIN HS: CPT

## 2025-02-25 PROCEDURE — 86431 RHEUMATOID FACTOR QUANT: CPT

## 2025-02-25 PROCEDURE — 80050 GENERAL HEALTH PANEL: CPT

## 2025-02-25 PROCEDURE — 80061 LIPID PANEL: CPT

## 2025-02-25 PROCEDURE — 72100 X-RAY EXAM L-S SPINE 2/3 VWS: CPT | Performed by: RADIOLOGY

## 2025-02-25 PROCEDURE — 84436 ASSAY OF TOTAL THYROXINE: CPT

## 2025-02-25 PROCEDURE — 72100 X-RAY EXAM L-S SPINE 2/3 VWS: CPT

## 2025-02-25 PROCEDURE — 73030 X-RAY EXAM OF SHOULDER: CPT

## 2025-02-25 PROCEDURE — 73523 X-RAY EXAM HIPS BI 5/> VIEWS: CPT | Performed by: RADIOLOGY

## 2025-02-25 PROCEDURE — 73564 X-RAY EXAM KNEE 4 OR MORE: CPT

## 2025-02-25 PROCEDURE — 73030 X-RAY EXAM OF SHOULDER: CPT | Performed by: RADIOLOGY

## 2025-02-25 PROCEDURE — 81374 HLA I TYPING 1 ANTIGEN LR: CPT

## 2025-02-25 PROCEDURE — 73564 X-RAY EXAM KNEE 4 OR MORE: CPT | Performed by: RADIOLOGY

## 2025-02-25 PROCEDURE — 86038 ANTINUCLEAR ANTIBODIES: CPT

## 2025-02-25 PROCEDURE — 85652 RBC SED RATE AUTOMATED: CPT

## 2025-02-25 PROCEDURE — 36415 COLL VENOUS BLD VENIPUNCTURE: CPT

## 2025-02-25 PROCEDURE — 84550 ASSAY OF BLOOD/URIC ACID: CPT

## 2025-02-25 PROCEDURE — 73523 X-RAY EXAM HIPS BI 5/> VIEWS: CPT

## 2025-02-25 PROCEDURE — 84480 ASSAY TRIIODOTHYRONINE (T3): CPT

## 2025-02-26 LAB
ANA SER QL IF: NEGATIVE
ENA SCL70 AB SER-ACNC: <0.2 AI (ref 0–0.9)
ENA SS-A AB SER-ACNC: <0.2 AI (ref 0–0.9)
ENA SS-B AB SER-ACNC: <0.2 AI (ref 0–0.9)

## 2025-02-27 LAB
GAMMA INTERFERON BACKGROUND BLD IA-ACNC: 0.05 IU/ML
M TB IFN-G BLD-IMP: NEGATIVE
M TB IFN-G CD4+ BCKGRND COR BLD-ACNC: 0.07 IU/ML
M TB IFN-G CD4+CD8+ BCKGRND COR BLD-ACNC: 0.07 IU/ML
MITOGEN IGNF BCKGRD COR BLD-ACNC: >10 IU/ML
QUANTIFERON INCUBATION: NORMAL
SERVICE CMNT-IMP: NORMAL

## 2025-02-28 LAB — 1,25(OH)2D SERPL-MCNC: 25.6 PG/ML (ref 24.8–81.5)

## 2025-03-01 LAB
RF IGA SER-ACNC: <7 U
RF IGG SER-ACNC: <7 U
RF IGM SER IA-ACNC: <7 U

## 2025-03-03 LAB — HLA-B27 QL NAA+PROBE: NEGATIVE

## 2025-03-05 ENCOUNTER — TRANSCRIBE ORDERS (OUTPATIENT)
Dept: ADMINISTRATIVE | Facility: HOSPITAL | Age: 35
End: 2025-03-05
Payer: COMMERCIAL

## 2025-03-05 DIAGNOSIS — Z30.014 ENCOUNTER FOR INITIAL PRESCRIPTION OF INTRAUTERINE CONTRACEPTIVE DEVICE (IUD): ICD-10-CM

## 2025-03-05 DIAGNOSIS — Z48.89 ENCOUNTER FOR POSTOPERATIVE CARE: Primary | ICD-10-CM

## 2025-03-05 DIAGNOSIS — R10.2 PAIN IN FEMALE PELVIS: ICD-10-CM

## 2025-03-05 DIAGNOSIS — G89.29 CHRONIC IDIOPATHIC ANAL PAIN: ICD-10-CM

## 2025-03-05 DIAGNOSIS — Z01.419 WELL WOMAN EXAM: ICD-10-CM

## 2025-03-05 DIAGNOSIS — K62.89 CHRONIC IDIOPATHIC ANAL PAIN: ICD-10-CM

## 2025-03-05 DIAGNOSIS — K52.9 INFLAMMATORY BOWEL DISEASE: Primary | ICD-10-CM

## 2025-03-05 DIAGNOSIS — Z83.2 FAMILY HISTORY OF ANEMIA: ICD-10-CM

## 2025-03-27 ENCOUNTER — HOSPITAL ENCOUNTER (EMERGENCY)
Facility: HOSPITAL | Age: 35
Discharge: HOME | End: 2025-03-27
Attending: EMERGENCY MEDICINE
Payer: COMMERCIAL

## 2025-03-27 VITALS
OXYGEN SATURATION: 98 % | TEMPERATURE: 98.1 F | WEIGHT: 114 LBS | HEIGHT: 60 IN | DIASTOLIC BLOOD PRESSURE: 66 MMHG | RESPIRATION RATE: 18 BRPM | HEART RATE: 99 BPM | SYSTOLIC BLOOD PRESSURE: 121 MMHG | BODY MASS INDEX: 22.38 KG/M2

## 2025-03-27 DIAGNOSIS — T78.49XA ALLERGIC REACTION TO ADHESIVE: Primary | ICD-10-CM

## 2025-03-27 PROCEDURE — 99283 EMERGENCY DEPT VISIT LOW MDM: CPT

## 2025-03-27 PROCEDURE — 96372 THER/PROPH/DIAG INJ SC/IM: CPT

## 2025-03-27 PROCEDURE — 25010000002 DEXAMETHASONE PER 1 MG: Performed by: PHYSICIAN ASSISTANT

## 2025-03-27 RX ORDER — DEXAMETHASONE SODIUM PHOSPHATE 10 MG/ML
6 INJECTION, SOLUTION INTRA-ARTICULAR; INTRALESIONAL; INTRAMUSCULAR; INTRAVENOUS; SOFT TISSUE ONCE
Status: COMPLETED | OUTPATIENT
Start: 2025-03-27 | End: 2025-03-27

## 2025-03-27 RX ORDER — PREDNISONE 20 MG/1
20 TABLET ORAL DAILY
Qty: 7 TABLET | Refills: 0 | Status: SHIPPED | OUTPATIENT
Start: 2025-03-27

## 2025-03-27 RX ORDER — FAMOTIDINE 20 MG/1
20 TABLET, FILM COATED ORAL ONCE
Status: COMPLETED | OUTPATIENT
Start: 2025-03-27 | End: 2025-03-27

## 2025-03-27 RX ADMIN — DEXAMETHASONE SODIUM PHOSPHATE 6 MG: 10 INJECTION INTRAMUSCULAR; INTRAVENOUS at 17:03

## 2025-03-27 RX ADMIN — FAMOTIDINE 20 MG: 20 TABLET, FILM COATED ORAL at 17:03

## 2025-03-27 NOTE — ED PROVIDER NOTES
Subjective   History of Present Illness  34-year-old female presents to the ER chief complaint allergic reaction.  Patient states that she is allergic to adhesives.  States that she tried to put on some nails on her fingers.  States that over the last 2 days she has had increasingly itching as well as hives denies any shortness of breath or swelling of the throat.        Review of Systems   Constitutional: Negative.  Negative for fever.   HENT: Negative.     Respiratory: Negative.     Cardiovascular: Negative.  Negative for chest pain.   Gastrointestinal: Negative.  Negative for abdominal pain.   Endocrine: Negative.    Genitourinary: Negative.  Negative for dysuria.   Skin:  Positive for rash.   Neurological: Negative.    Psychiatric/Behavioral: Negative.     All other systems reviewed and are negative.      Past Medical History:   Diagnosis Date    Anxiety     Endometriosis 2016    Status post multiple laparoscopies    Oral thrush 2012    hospitalized x 2 weeks    Pain        Allergies   Allergen Reactions    Acetaminophen Other (See Comments)     Tachycardia    Codeine Other (See Comments)     Increased Heart Rate    Wound Dressing Adhesive Rash     Pt reports bad rash after having adhesive tape from surgery       Past Surgical History:   Procedure Laterality Date     SECTION N/A 2020    Procedure: Repeat LTCS (1 layer closure);  Surgeon: Yassine Prince MD;  Location: [facility] LABOR DELIVERY;  Service: Obstetrics/Gynecology;  Laterality: N/A;     SECTION PRIMARY  2013    LTCS (2 layer)    D & [facility] LAP[facility]OSCOPY N/A [facility] Dr. Mora; endometrial pathology benign; laparoscopic findings consistent with endometriosis    DIAGNOSTIC LAPAROSCOPY      [facility]; Dr. Mora; findings consistent with endometriosis    DIAGNOSTIC LAPAROSCOPY N/A 2016    [facility]; Dr. Mora; diagnostic laparoscopy with fulguration of endometriosis     DIAGNOSTIC LAPAROSCOPY N/A 7/2/2019    [facility]; Dr. Mora; findings consistent with endometriosis    ENDOSCOPY  2016    Normal    GANGLION CYST EXCISION Right 2014    SKIN BIOPSY  2017    on abdomen - all benign    TOTAL LAPAROSCOPIC HYSTERECTOMY N/A 5/11/2022    Procedure: ROBOTIC TOTAL LAPAROSCOPIC HYSTERECTOMY SALPINGECTOMY,;  Surgeon: Jose Cruz Ward DO;  Location: [facility];  Service: Robotics - DaVinci;  Laterality: N/A;       Family History   Problem Relation Age of Onset    Diabetes Sister     Heart disease Maternal Grandmother     Hypertension Maternal Grandmother        Social History     Socioeconomic History    Marital status: Legally    Tobacco Use    Smoking status: Never    Smokeless tobacco: Never   Vaping Use    Vaping status: Never Used   Substance and Sexual Activity    Alcohol use: No    Drug use: No    Sexual activity: Yes     Partners: Male     Birth control/protection: None, Hysterectomy           Objective   Physical Exam  Vitals and nursing note reviewed.   Constitutional:       General: She is not in acute distress.     Appearance: She is well-developed. She is not diaphoretic.   HENT:      Head: Normocephalic and atraumatic.      Right Ear: External ear normal.      Left Ear: External ear normal.      Nose: Nose normal.   Eyes:      Conjunctiva/sclera: Conjunctivae normal.   Neck:      Vascular: No JVD.      Trachea: No tracheal deviation.   Cardiovascular:      Rate and Rhythm: Normal rate.      Heart sounds: No murmur heard.  Pulmonary:      Effort: Pulmonary effort is normal. No respiratory distress.      Breath sounds: No wheezing.   Abdominal:      Palpations: Abdomen is soft.      Tenderness: There is no abdominal tenderness.   Musculoskeletal:         General: No deformity. Normal range of motion.      Cervical back: Normal range of motion and neck supple.   Skin:     General: Skin is warm and dry.      Coloration: Skin is not pale.      Findings:  Erythema and rash present.      Comments: Urticarial rash   Neurological:      Mental Status: She is alert and oriented to person, place, and time.      Cranial Nerves: No cranial nerve deficit.   Psychiatric:         Behavior: Behavior normal.         Thought Content: Thought content normal.         Procedures           ED Course                                                       Medical Decision Making  Risk  Prescription drug management.        Final diagnoses:   Allergic reaction to adhesive       ED Disposition  ED Disposition       ED Disposition   Discharge    Condition   Stable    Comment   --               Haris Peacock, APRN  [address] [facility] [address]  [phone]    Schedule an appointment as soon as possible for a visit            Where to Get Your Medications        These medications were sent to [facility] - [place], [place] - [address] [place] [address] - [phone]  - [phone] [address] [address]      Phone: [phone]   predniSONE 20 MG tablet          Medication List      No changes were made to your prescriptions during this visit.            Onel Mendoza II, PA  03/27/25 [id number]

## 2025-04-17 ENCOUNTER — TRANSCRIBE ORDERS (OUTPATIENT)
Dept: ADMINISTRATIVE | Facility: HOSPITAL | Age: 35
End: 2025-04-17
Payer: COMMERCIAL

## 2025-04-17 ENCOUNTER — LAB (OUTPATIENT)
Dept: LAB | Facility: HOSPITAL | Age: 35
End: 2025-04-17
Payer: COMMERCIAL

## 2025-04-17 DIAGNOSIS — R10.2 PAIN IN FEMALE PELVIS: ICD-10-CM

## 2025-04-17 DIAGNOSIS — Z83.2 FAMILY HISTORY OF ANEMIA: ICD-10-CM

## 2025-04-17 DIAGNOSIS — Z01.419 WELL WOMAN EXAM: ICD-10-CM

## 2025-04-17 DIAGNOSIS — R25.2 CRAMP OF LIMB: Primary | ICD-10-CM

## 2025-04-17 DIAGNOSIS — Z30.014 ENCOUNTER FOR INITIAL PRESCRIPTION OF INTRAUTERINE CONTRACEPTIVE DEVICE (IUD): ICD-10-CM

## 2025-04-17 DIAGNOSIS — Z48.89 ENCOUNTER FOR POSTOPERATIVE CARE: ICD-10-CM

## 2025-04-17 DIAGNOSIS — G89.29 CHRONIC IDIOPATHIC ANAL PAIN: ICD-10-CM

## 2025-04-17 DIAGNOSIS — K52.9 INFLAMMATORY BOWEL DISEASE: ICD-10-CM

## 2025-04-17 DIAGNOSIS — K58.1 IRRITABLE BOWEL SYNDROME WITH CONSTIPATION: ICD-10-CM

## 2025-04-17 DIAGNOSIS — K62.89 CHRONIC IDIOPATHIC ANAL PAIN: ICD-10-CM

## 2025-04-17 DIAGNOSIS — R25.2 CRAMP OF LIMB: ICD-10-CM

## 2025-04-17 LAB — BILIRUB CONJ SERPL-MCNC: 0.2 MG/DL (ref 0–0.3)

## 2025-04-17 PROCEDURE — 80053 COMPREHEN METABOLIC PANEL: CPT

## 2025-04-17 PROCEDURE — 86037 ANCA TITER EACH ANTIBODY: CPT

## 2025-04-17 PROCEDURE — 36415 COLL VENOUS BLD VENIPUNCTURE: CPT

## 2025-04-17 PROCEDURE — 83516 IMMUNOASSAY NONANTIBODY: CPT

## 2025-04-17 PROCEDURE — 82248 BILIRUBIN DIRECT: CPT

## 2025-04-17 PROCEDURE — 82607 VITAMIN B-12: CPT

## 2025-04-17 PROCEDURE — 86671 FUNGUS NES ANTIBODY: CPT

## 2025-04-18 LAB
ALBUMIN SERPL-MCNC: 4.5 G/DL (ref 3.5–5.2)
ALBUMIN/GLOB SERPL: 1.7 G/DL
ALP SERPL-CCNC: 73 U/L (ref 39–117)
ALT SERPL W P-5'-P-CCNC: 21 U/L (ref 1–33)
ANION GAP SERPL CALCULATED.3IONS-SCNC: 11.2 MMOL/L (ref 5–15)
AST SERPL-CCNC: 15 U/L (ref 1–32)
BILIRUB SERPL-MCNC: 0.4 MG/DL (ref 0–1.2)
BUN SERPL-MCNC: 6 MG/DL (ref 6–20)
BUN/CREAT SERPL: 9.7 (ref 7–25)
CALCIUM SPEC-SCNC: 9.4 MG/DL (ref 8.6–10.5)
CHLORIDE SERPL-SCNC: 104 MMOL/L (ref 98–107)
CO2 SERPL-SCNC: 21.8 MMOL/L (ref 22–29)
CREAT SERPL-MCNC: 0.62 MG/DL (ref 0.57–1)
EGFRCR SERPLBLD CKD-EPI 2021: 121.5 ML/MIN/1.73
GLOBULIN UR ELPH-MCNC: 2.6 GM/DL
GLUCOSE SERPL-MCNC: 91 MG/DL (ref 65–99)
POTASSIUM SERPL-SCNC: 3.7 MMOL/L (ref 3.5–5.2)
PROT SERPL-MCNC: 7.1 G/DL (ref 6–8.5)
SODIUM SERPL-SCNC: 137 MMOL/L (ref 136–145)
VIT B12 BLD-MCNC: 355 PG/ML (ref 211–946)

## 2025-04-22 LAB
BAKER'S YEAST IGA QN: 65.3 UNITS (ref 0–24.9)
BAKER'S YEAST IGG QN: 31.2 UNITS (ref 0–24.9)
C-ANCA TITR SER IF: NORMAL TITER
MYELOPEROXIDASE AB SER IA-ACNC: <0.2 UNITS (ref 0–0.9)
P-ANCA ATYPICAL TITR SER IF: NORMAL TITER
P-ANCA TITR SER IF: NORMAL TITER
PROTEINASE3 AB SER IA-ACNC: <0.2 UNITS (ref 0–0.9)

## 2025-05-08 ENCOUNTER — TRANSCRIBE ORDERS (OUTPATIENT)
Dept: ADMINISTRATIVE | Facility: HOSPITAL | Age: 35
End: 2025-05-08
Payer: COMMERCIAL

## 2025-05-08 ENCOUNTER — HOSPITAL ENCOUNTER (OUTPATIENT)
Dept: GENERAL RADIOLOGY | Facility: HOSPITAL | Age: 35
Discharge: HOME | End: 2025-05-08
Admitting: NURSE PRACTITIONER
Payer: COMMERCIAL

## 2025-05-08 DIAGNOSIS — M25.562 LEFT KNEE PAIN, UNSPECIFIED CHRONICITY: Primary | ICD-10-CM

## 2025-05-08 PROCEDURE — 73560 X-RAY EXAM OF KNEE 1 OR 2: CPT | Performed by: RADIOLOGY

## 2025-05-08 PROCEDURE — 73560 X-RAY EXAM OF KNEE 1 OR 2: CPT

## 2025-06-04 ENCOUNTER — TRANSCRIBE ORDERS (OUTPATIENT)
Dept: ADMINISTRATIVE | Facility: HOSPITAL | Age: 35
End: 2025-06-04
Payer: COMMERCIAL

## 2025-06-04 ENCOUNTER — LAB (OUTPATIENT)
Dept: LAB | Facility: HOSPITAL | Age: 35
End: 2025-06-04
Payer: COMMERCIAL

## 2025-06-04 DIAGNOSIS — E78.2 MIXED HYPERLIPIDEMIA: ICD-10-CM

## 2025-06-04 DIAGNOSIS — R74.02 NONSPECIFIC ELEVATION OF LEVELS OF TRANSAMINASE OR LACTIC ACID DEHYDROGENASE (LDH): Primary | ICD-10-CM

## 2025-06-04 DIAGNOSIS — R74.01 NONSPECIFIC ELEVATION OF LEVELS OF TRANSAMINASE OR LACTIC ACID DEHYDROGENASE (LDH): Primary | ICD-10-CM

## 2025-06-04 DIAGNOSIS — R74.02 NONSPECIFIC ELEVATION OF LEVELS OF TRANSAMINASE OR LACTIC ACID DEHYDROGENASE (LDH): ICD-10-CM

## 2025-06-04 DIAGNOSIS — R74.01 NONSPECIFIC ELEVATION OF LEVELS OF TRANSAMINASE OR LACTIC ACID DEHYDROGENASE (LDH): ICD-10-CM

## 2025-06-04 LAB
ALBUMIN SERPL-MCNC: 4.5 G/DL (ref 3.5–5.2)
ALBUMIN/GLOB SERPL: 1.6 G/DL
ALP SERPL-CCNC: 126 U/L (ref 39–117)
ALT SERPL W P-5'-P-CCNC: 46 U/L (ref 1–33)
ANION GAP SERPL CALCULATED.3IONS-SCNC: 11.3 MMOL/L (ref 5–15)
AST SERPL-CCNC: 16 U/L (ref 1–32)
BILIRUB SERPL-MCNC: 0.3 MG/DL (ref 0–1.2)
BUN SERPL-MCNC: 6 MG/DL (ref 6–20)
BUN/CREAT SERPL: 9.1 (ref 7–25)
CALCIUM SPEC-SCNC: 9.6 MG/DL (ref 8.6–10.5)
CHLORIDE SERPL-SCNC: 103 MMOL/L (ref 98–107)
CHOLEST SERPL-MCNC: 202 MG/DL (ref 0–200)
CO2 SERPL-SCNC: 23.7 MMOL/L (ref 22–29)
CREAT SERPL-MCNC: 0.66 MG/DL (ref 0.57–1)
EGFRCR SERPLBLD CKD-EPI 2021: 119.7 ML/MIN/1.73
GGT SERPL-CCNC: 155 U/L (ref 5–36)
GLOBULIN UR ELPH-MCNC: 2.8 GM/DL
GLUCOSE SERPL-MCNC: 83 MG/DL (ref 65–99)
HDLC SERPL-MCNC: 41 MG/DL (ref 40–60)
LDLC SERPL CALC-MCNC: 139 MG/DL (ref 0–100)
LDLC/HDLC SERPL: 3.34 {RATIO}
POTASSIUM SERPL-SCNC: 3.2 MMOL/L (ref 3.5–5.2)
PROT SERPL-MCNC: 7.3 G/DL (ref 6–8.5)
SODIUM SERPL-SCNC: 138 MMOL/L (ref 136–145)
TRIGL SERPL-MCNC: 121 MG/DL (ref 0–150)
VLDLC SERPL-MCNC: 22 MG/DL (ref 5–40)

## 2025-06-04 PROCEDURE — 80053 COMPREHEN METABOLIC PANEL: CPT

## 2025-06-04 PROCEDURE — 82977 ASSAY OF GGT: CPT

## 2025-06-04 PROCEDURE — 80061 LIPID PANEL: CPT

## 2025-06-04 PROCEDURE — 36415 COLL VENOUS BLD VENIPUNCTURE: CPT

## 2025-06-05 ENCOUNTER — TRANSCRIBE ORDERS (OUTPATIENT)
Dept: ADMINISTRATIVE | Facility: HOSPITAL | Age: 35
End: 2025-06-05
Payer: COMMERCIAL

## 2025-06-05 DIAGNOSIS — N64.52 NIPPLE DISCHARGE: ICD-10-CM

## 2025-06-05 DIAGNOSIS — R74.01 HIGH LIVER TRANSAMINASE LEVEL: Primary | ICD-10-CM

## 2025-06-05 DIAGNOSIS — N64.4 MASTODYNIA: ICD-10-CM

## 2025-06-10 ENCOUNTER — HOSPITAL ENCOUNTER (OUTPATIENT)
Dept: ULTRASOUND IMAGING | Facility: HOSPITAL | Age: 35
Discharge: HOME | End: 2025-06-10
Admitting: NURSE PRACTITIONER
Payer: COMMERCIAL

## 2025-06-10 DIAGNOSIS — R74.01 HIGH LIVER TRANSAMINASE LEVEL: ICD-10-CM

## 2025-06-10 PROCEDURE — 76700 US EXAM ABDOM COMPLETE: CPT | Performed by: RADIOLOGY

## 2025-06-10 PROCEDURE — 76700 US EXAM ABDOM COMPLETE: CPT

## 2025-07-03 ENCOUNTER — TRANSCRIBE ORDERS (OUTPATIENT)
Dept: ADMINISTRATIVE | Facility: HOSPITAL | Age: 35
End: 2025-07-03
Payer: COMMERCIAL

## 2025-07-03 DIAGNOSIS — K82.8 OTHER SPECIFIED DISEASES OF GALLBLADDER: Primary | ICD-10-CM

## 2025-07-18 ENCOUNTER — HOSPITAL ENCOUNTER (OUTPATIENT)
Dept: NUCLEAR MEDICINE | Facility: HOSPITAL | Age: 35
Discharge: HOME | End: 2025-07-18
Payer: COMMERCIAL

## 2025-07-18 DIAGNOSIS — K82.8 OTHER SPECIFIED DISEASES OF GALLBLADDER: ICD-10-CM

## 2025-07-18 PROCEDURE — 25010000002 SINCALIDE PER 5 MCG: Performed by: NURSE PRACTITIONER

## 2025-07-18 PROCEDURE — 34310000005 TECHNETIUM TC 99M MEBROFENIN KIT: Performed by: NURSE PRACTITIONER

## 2025-07-18 PROCEDURE — 78227 HEPATOBIL SYST IMAGE W/DRUG: CPT

## 2025-07-18 PROCEDURE — A9537 TC99M MEBROFENIN: HCPCS | Performed by: NURSE PRACTITIONER

## 2025-07-18 RX ORDER — KIT FOR THE PREPARATION OF TECHNETIUM TC 99M MEBROFENIN 45 MG/10ML
1 INJECTION, POWDER, LYOPHILIZED, FOR SOLUTION INTRAVENOUS
Status: COMPLETED | OUTPATIENT
Start: 2025-07-18 | End: 2025-07-18

## 2025-07-18 RX ORDER — SINCALIDE 5 UG/5ML
0.04 INJECTION, POWDER, LYOPHILIZED, FOR SOLUTION INTRAVENOUS
Status: COMPLETED | OUTPATIENT
Start: 2025-07-18 | End: 2025-07-18

## 2025-07-18 RX ADMIN — SINCALIDE 2.1 MCG: 5 INJECTION, POWDER, LYOPHILIZED, FOR SOLUTION INTRAVENOUS at 09:09

## 2025-07-18 RX ADMIN — MEBROFENIN 1 DOSE: 45 INJECTION, POWDER, LYOPHILIZED, FOR SOLUTION INTRAVENOUS at 08:20

## (undated) DEVICE — 3M™ STERI-STRIP™ REINFORCED ADHESIVE SKIN CLOSURES, R1547, 1/2 IN X 4 IN (12 MM X 100 MM), 6 STRIPS/ENVELOPE: Brand: 3M™ STERI-STRIP™

## (undated) DEVICE — ST TBG PNEUMOCLEAR EVAC SMOKE HIFLO

## (undated) DEVICE — CANNULA SEAL

## (undated) DEVICE — ARM DRAPE

## (undated) DEVICE — SOL IRR NACL 0.9PCT BT 1000ML

## (undated) DEVICE — PK C/SECT 10

## (undated) DEVICE — SOL IRR H2O BTL 1000ML STRL

## (undated) DEVICE — 2, DISPOSABLE SUCTION/IRRIGATOR WITH DISPOSABLE TIP: Brand: STRYKEFLOW

## (undated) DEVICE — ENCORE® LATEX MICRO SIZE 7.5, STERILE LATEX POWDER-FREE SURGICAL GLOVE: Brand: ENCORE

## (undated) DEVICE — 40595 XL TRENDELENBURG POSITIONING KIT: Brand: 40595 XL TRENDELENBURG POSITIONING KIT

## (undated) DEVICE — GLV SURG PREMIERPRO MIC LTX PF SZ8 BRN

## (undated) DEVICE — SUT GUT CHRM 1 CTX 36IN 905H

## (undated) DEVICE — SUT VIC 3/0 CT1 27IN DYED J338H

## (undated) DEVICE — APPL CHLORAPREP HI/LITE 26ML ORNG

## (undated) DEVICE — PK DAVINCI 70

## (undated) DEVICE — ENDOPATH XCEL BLADELESS TROCARS WITH STABILITY SLEEVES: Brand: ENDOPATH XCEL

## (undated) DEVICE — ENDOPATH XCEL UNIVERSAL TROCAR STABLILITY SLEEVES: Brand: ENDOPATH XCEL

## (undated) DEVICE — PATIENT RETURN ELECTRODE, SINGLE-USE, CONTACT QUALITY MONITORING, ADULT, WITH 9FT CORD, FOR PATIENTS WEIGING OVER 33LBS. (15KG): Brand: MEGADYNE

## (undated) DEVICE — MANIP UTER ADVINCULA DELINEATOR 3.5CM

## (undated) DEVICE — DRSNG TELFA PAD NONADH STR 1S 3X4IN

## (undated) DEVICE — PAD GRND REM POLYHESIVE A/ DISP

## (undated) DEVICE — TRY SPINE BLCK WHITACRE 25G 3X5IN

## (undated) DEVICE — SUT MNCRYL 4/0 PS2 18 IN

## (undated) DEVICE — [HIGH FLOW INSUFFLATOR,  DO NOT USE IF PACKAGE IS DAMAGED,  KEEP DRY,  KEEP AWAY FROM SUNLIGHT,  PROTECT FROM HEAT AND RADIOACTIVE SOURCES.]: Brand: PNEUMOSURE

## (undated) DEVICE — COVER,MAYO STAND,STERILE: Brand: MEDLINE

## (undated) DEVICE — APPL CHLORAPREP W/TINT 26ML ORNG

## (undated) DEVICE — SUT PDS 0 CTX 36IN DYED Z370T

## (undated) DEVICE — SKIN AFFIX SURG ADHESIVE 72/CS 0.55ML: Brand: MEDLINE

## (undated) DEVICE — PAD SANI MAXI W/ADHS SNG WRP 11IN

## (undated) DEVICE — VESSEL SEALER EXTEND: Brand: ENDOWRIST

## (undated) DEVICE — COR GYN LAPAROSCOPY: Brand: MEDLINE INDUSTRIES, INC.

## (undated) DEVICE — PROGRASP FORCEPS: Brand: ENDOWRIST

## (undated) DEVICE — ADHS SKIN PREMIERPRO EXOFIN TOPICAL HI/VISC .5ML

## (undated) DEVICE — FRCP DISSCT LYONS W 9PIN 5X33

## (undated) DEVICE — MEGA SUTURECUT ND: Brand: ENDOWRIST

## (undated) DEVICE — DRSNG WND BORDR/ADHS NONADHR/GZ LF 2X2IN STRL

## (undated) DEVICE — ANTIBACTERIAL VIOLET BRAIDED (POLYGLACTIN 910), SYNTHETIC ABSORBABLE SURGICAL SUTURE: Brand: COATED VICRYL

## (undated) DEVICE — DRAPE,UTILTY,TAPE,15X26, 4EA/PK: Brand: MEDLINE

## (undated) DEVICE — UNDYED BRAIDED (POLYGLACTIN 910), SYNTHETIC ABSORBABLE SUTURE: Brand: COATED VICRYL

## (undated) DEVICE — TIP COVER ACCESSORY

## (undated) DEVICE — GLV SURG BIOGEL LTX PF 7 1/2

## (undated) DEVICE — STPLR SKIN SUBCUTICULAR INSORB 2030

## (undated) DEVICE — MAT PREVALON MOBL TRANSFR AIR WO/PAD 39X80IN

## (undated) DEVICE — MONOPOLAR CURVED SCISSORS: Brand: ENDOWRIST

## (undated) DEVICE — SUT VIC 2/0 CT1 27IN J339H BX/36

## (undated) DEVICE — SUT VIC 0/0 UR6 27IN DYED J603H

## (undated) DEVICE — BLADELESS OBTURATOR: Brand: WECK VISTA